# Patient Record
Sex: FEMALE | Race: WHITE | Employment: UNEMPLOYED | URBAN - METROPOLITAN AREA
[De-identification: names, ages, dates, MRNs, and addresses within clinical notes are randomized per-mention and may not be internally consistent; named-entity substitution may affect disease eponyms.]

---

## 2017-01-06 PROBLEM — R73.9 HYPERGLYCEMIA: Status: ACTIVE | Noted: 2017-01-06

## 2017-08-15 PROBLEM — M10.9 ACUTE GOUT OF KNEE: Status: ACTIVE | Noted: 2017-08-15

## 2017-09-22 ENCOUNTER — OFFICE VISIT (OUTPATIENT)
Dept: ORTHOPEDIC SURGERY | Age: 52
End: 2017-09-22
Payer: COMMERCIAL

## 2017-09-22 VITALS
HEART RATE: 97 BPM | DIASTOLIC BLOOD PRESSURE: 94 MMHG | HEIGHT: 63 IN | WEIGHT: 218 LBS | SYSTOLIC BLOOD PRESSURE: 150 MMHG | BODY MASS INDEX: 38.62 KG/M2

## 2017-09-22 DIAGNOSIS — S89.91XA RIGHT KNEE INJURY, INITIAL ENCOUNTER: Primary | ICD-10-CM

## 2017-09-22 PROCEDURE — 20610 DRAIN/INJ JOINT/BURSA W/O US: CPT | Performed by: ORTHOPAEDIC SURGERY

## 2017-09-22 PROCEDURE — 99203 OFFICE O/P NEW LOW 30 MIN: CPT | Performed by: ORTHOPAEDIC SURGERY

## 2017-09-22 RX ORDER — BETAMETHASONE SODIUM PHOSPHATE AND BETAMETHASONE ACETATE 3; 3 MG/ML; MG/ML
12 INJECTION, SUSPENSION INTRA-ARTICULAR; INTRALESIONAL; INTRAMUSCULAR; SOFT TISSUE ONCE
Status: COMPLETED | OUTPATIENT
Start: 2017-09-22 | End: 2017-09-22

## 2017-09-22 RX ORDER — BUPIVACAINE HYDROCHLORIDE 5 MG/ML
2 INJECTION, SOLUTION PERINEURAL ONCE
Status: COMPLETED | OUTPATIENT
Start: 2017-09-22 | End: 2017-09-22

## 2017-09-22 RX ORDER — LIDOCAINE HYDROCHLORIDE 10 MG/ML
2 INJECTION, SOLUTION EPIDURAL; INFILTRATION; INTRACAUDAL; PERINEURAL ONCE
Status: COMPLETED | OUTPATIENT
Start: 2017-09-22 | End: 2017-09-22

## 2017-09-22 RX ADMIN — BUPIVACAINE HYDROCHLORIDE 10 MG: 5 INJECTION, SOLUTION PERINEURAL at 11:00

## 2017-09-22 RX ADMIN — BETAMETHASONE SODIUM PHOSPHATE AND BETAMETHASONE ACETATE 12 MG: 3; 3 INJECTION, SUSPENSION INTRA-ARTICULAR; INTRALESIONAL; INTRAMUSCULAR; SOFT TISSUE at 10:59

## 2017-09-22 RX ADMIN — LIDOCAINE HYDROCHLORIDE 2 ML: 10 INJECTION, SOLUTION EPIDURAL; INFILTRATION; INTRACAUDAL; PERINEURAL at 11:00

## 2017-10-05 ENCOUNTER — TELEPHONE (OUTPATIENT)
Dept: ORTHOPEDIC SURGERY | Age: 52
End: 2017-10-05

## 2017-10-05 DIAGNOSIS — S89.91XA RIGHT KNEE INJURY, INITIAL ENCOUNTER: ICD-10-CM

## 2017-10-13 ENCOUNTER — OFFICE VISIT (OUTPATIENT)
Dept: ORTHOPEDIC SURGERY | Age: 52
End: 2017-10-13
Payer: COMMERCIAL

## 2017-10-13 VITALS — HEART RATE: 90 BPM | SYSTOLIC BLOOD PRESSURE: 118 MMHG | DIASTOLIC BLOOD PRESSURE: 69 MMHG

## 2017-10-13 DIAGNOSIS — S83.281D ACUTE LATERAL MENISCUS TEAR OF RIGHT KNEE, SUBSEQUENT ENCOUNTER: Primary | ICD-10-CM

## 2017-10-13 PROCEDURE — 99212 OFFICE O/P EST SF 10 MIN: CPT | Performed by: ORTHOPAEDIC SURGERY

## 2017-10-13 NOTE — PROGRESS NOTES
Molly Allen M.D.            [address] [facility], [address], [facility] [address].             Dept Phone: [phone]             Dept Fax:  [phone] returns today discuss results the MRI of her right knee which were performed at [facility].. The MRI was consistent with a complex tears of both the medial and lateral meniscus. She also had grade 1 strain's of the ACL PCL and LCL. No new examination was carried out today please see prior dictations    Impression  Status post above findings based on her MRI    Plan  The patient and I and her  discussed the surgical ramifications of arthroscopy. I don't think anything need to be done to her cruciates or collaterals. Has been several weeks knee should be well stabilized at this point. Specially for grade 1 injuries. She is get a have long-term problems with medial lateral meniscus tears I think she'll benefit from having arthroscopy. She does wish to proceed        Patient also had some onset of swelling to her left knee. She is on allopurinol for gout in the the but she's never had an injection to this. I told the patient that be happy to aspirate her knee at the time of her knee scope to assess for possible gout. We'll get her scheduled accordingly. Review of Systems   Constitutional: Negative. HENT: Negative. Respiratory: Negative. Cardiovascular: Negative. Musculoskeletal: Negative. Neurological: Negative.          Past Medical History:   Diagnosis Date    Cancer [facility]) 1987    cervical    Chiari I malformation ([facility] [address]     Chronic back pain     Elevated liver enzymes 09/2016    Fibromyalgia     Headache     Hepatic steatosis     Hyperthyroidism     Liver disease     Obesity     Osteoarthritis     Urinary incontinence      Past Surgical History:   Procedure Laterality Date    BRAIN SURGERY      HYSTERECTOMY      SHOULDER SURGERY Right    

## 2017-10-20 ENCOUNTER — HOSPITAL ENCOUNTER (OUTPATIENT)
Dept: PREADMISSION TESTING | Age: 52
Discharge: HOME | End: 2017-10-20
Payer: COMMERCIAL

## 2017-10-20 VITALS
BODY MASS INDEX: 38.62 KG/M2 | TEMPERATURE: 97.7 F | RESPIRATION RATE: 16 BRPM | SYSTOLIC BLOOD PRESSURE: 118 MMHG | HEART RATE: 88 BPM | OXYGEN SATURATION: 95 % | HEIGHT: 63 IN | DIASTOLIC BLOOD PRESSURE: 88 MMHG | WEIGHT: 218 LBS

## 2017-10-20 DIAGNOSIS — D68.00 VON WILLEBRAND DISEASE: ICD-10-CM

## 2017-10-20 LAB
ABSOLUTE EOS #: 0.4 K/UL (ref 0–0.4)
ABSOLUTE IMMATURE GRANULOCYTE: ABNORMAL K/UL (ref 0–0.3)
ABSOLUTE LYMPH #: 1.1 K/UL (ref 1–4.8)
ABSOLUTE MONO #: 0.5 K/UL (ref 0.1–1.3)
ANION GAP SERPL CALCULATED.3IONS-SCNC: 16 MMOL/L (ref 9–17)
BASOPHILS # BLD: 1 %
BASOPHILS ABSOLUTE: 0.1 K/UL (ref 0–0.2)
BUN BLDV-MCNC: 8 MG/DL (ref 6–20)
BUN/CREAT BLD: ABNORMAL (ref 9–20)
CALCIUM SERPL-MCNC: 9.4 MG/DL (ref 8.6–10.4)
CHLORIDE BLD-SCNC: 95 MMOL/L (ref 98–107)
CO2: 25 MMOL/L (ref 20–31)
CREAT SERPL-MCNC: 0.7 MG/DL (ref 0.5–0.9)
DIFFERENTIAL TYPE: ABNORMAL
EKG ATRIAL RATE: 89 BPM
EKG P AXIS: 49 DEGREES
EKG P-R INTERVAL: 170 MS
EKG Q-T INTERVAL: 378 MS
EKG QRS DURATION: 96 MS
EKG QTC CALCULATION (BAZETT): 459 MS
EKG R AXIS: 23 DEGREES
EKG T AXIS: 42 DEGREES
EKG VENTRICULAR RATE: 89 BPM
EOSINOPHILS RELATIVE PERCENT: 3 %
GFR AFRICAN AMERICAN: >60 ML/MIN
GFR NON-AFRICAN AMERICAN: >60 ML/MIN
GFR SERPL CREATININE-BSD FRML MDRD: ABNORMAL ML/MIN/{1.73_M2}
GFR SERPL CREATININE-BSD FRML MDRD: ABNORMAL ML/MIN/{1.73_M2}
GLUCOSE BLD-MCNC: 126 MG/DL (ref 70–99)
HCT VFR BLD CALC: 37.5 % (ref 36–46)
HEMOGLOBIN: 12.8 G/DL (ref 12–16)
IMMATURE GRANULOCYTES: ABNORMAL %
INR BLD: 1
LYMPHOCYTES # BLD: 9 %
MCH RBC QN AUTO: 30.7 PG (ref 26–34)
MCHC RBC AUTO-ENTMCNC: 34 G/DL (ref 31–37)
MCV RBC AUTO: 90.3 FL (ref 80–100)
MONOCYTES # BLD: 4 %
PARTIAL THROMBOPLASTIN TIME: 37.4 SEC (ref 23–31)
PDW BLD-RTO: 15.2 % (ref 11.5–14.9)
PLATELET # BLD: 456 K/UL (ref 150–450)
PLATELET ESTIMATE: ABNORMAL
PMV BLD AUTO: 9 FL (ref 6–12)
POTASSIUM SERPL-SCNC: 3.7 MMOL/L (ref 3.7–5.3)
PROTHROMBIN TIME: 11 SEC (ref 9.7–12)
RBC # BLD: 4.15 M/UL (ref 4–5.2)
RBC # BLD: ABNORMAL 10*6/UL
SEG NEUTROPHILS: 83 %
SEGMENTED NEUTROPHILS ABSOLUTE COUNT: 10 K/UL (ref 1.3–9.1)
SODIUM BLD-SCNC: 136 MMOL/L (ref 135–144)
WBC # BLD: 12.1 K/UL (ref 3.5–11)
WBC # BLD: ABNORMAL 10*3/UL

## 2017-10-20 PROCEDURE — 85025 COMPLETE CBC W/AUTO DIFF WBC: CPT

## 2017-10-20 PROCEDURE — 85610 PROTHROMBIN TIME: CPT

## 2017-10-20 PROCEDURE — 85730 THROMBOPLASTIN TIME PARTIAL: CPT

## 2017-10-20 PROCEDURE — 80048 BASIC METABOLIC PNL TOTAL CA: CPT

## 2017-10-20 PROCEDURE — 93005 ELECTROCARDIOGRAM TRACING: CPT

## 2017-10-20 PROCEDURE — 36415 COLL VENOUS BLD VENIPUNCTURE: CPT

## 2017-10-20 NOTE — H&P
release tablet Take 10 mg by mouth every 6 hours as needed for Pain .  levothyroxine (SYNTHROID) 75 MCG tablet TAKE 1 TABLET BY MOUTH EVERY DAY 30 tablet 11    Cholecalciferol 2000 UNITS TABS Take by mouth      Multiple Vitamins-Minerals (THERAPEUTIC MULTIVITAMIN-MINERALS) tablet Take 1 tablet by mouth daily      vitamin B-12 (CYANOCOBALAMIN) 500 MCG tablet Take 500 mcg by mouth daily      pregabalin (LYRICA) 150 MG capsule Take 1 capsule by mouth daily (Patient taking differently: Take 150 mg by mouth 2 times daily ) 60 capsule 2    Biotin 5 MG CAPS Take 1 capsule by mouth daily      valACYclovir (VALTREX) 500 MG tablet Take 500 mg by mouth daily as needed       Insulin Pen Needle (B-D ULTRAFINE III SHORT PEN) 31G X 8 MM MISC Inject 1 each into the skin daily May substitute with any brand 100 each 11     No current facility-administered medications on file prior to encounter. General health:  Fairly good. No fever or chills. Skin:  No itching, redness or rash. HEENT:  No epistaxis or sore throat. Headache, glasses, B hearing aides,               Neck:  No pain, stiffness or masses. Cardiovascular/Respiratory system:  No chest pain, palpitation or shortness of breath. Gastrointestinal tract: No abdominal pain, nausea, vomiting, diarrhea or constipation. GERD, indigestion. Genitourinary:  No burning on micturition. No hesitancy, urgency, frequency or discoloration of urine. Locomotor:  No bone or joint pains. No swelling. Neuropsychiatric:  No referable complaints. Some depression. Pseudo seizure, last couple months ago, weakness afterwards. See HPI. GENERAL PHYSICAL EXAM:     Vitals: /88   Pulse 88   Temp 97.7 °F (36.5 °C)   Resp 16   Ht 5' 3\" (1.6 m)   Wt 218 lb (98.9 kg)   SpO2 95%   BMI 38.62 kg/m²  Body mass index is 38.62 kg/m².        GENERAL APPEARANCE:   Magdalena Keith is 47 y.o.,  female, moderately obese, nourished, conscious, alert. Does not appear to be distress or pain at this time. SKIN:  Warm, dry, no cyanosis or jaundice. HEAD:  Normocephalic, atraumatic, no swelling or tenderness. EYES:  Pupils equal, reactive to light. EARS:  No discharge, no marked hearing loss. NOSE:  No rhinorrhea, epistaxis or septal deformity. THROAT:  Not congested. No ulceration bleeding or discharge. NECK:  No stiffness, trachea central.  No palpable masses or L.N.                 CHEST:  Symmetrical and equal on expansion. HEART:  RRR S1 > S2. No audible murmurs or gallops. LUNGS:  Equal on expansion, normal breath sounds. No adventitious sounds. ABDOMEN:  Obese. Soft on palpation. No localized tenderness. No guarding or rigidity. No palpable organomegaly. LYMPHATICS:  No palpable cervical lymphadenopathy. LOCOMOTOR, BACK AND SPINE:  No tenderness or deformities. EXTREMITIES:  Symmetrical, no pedal edema. Karmens sign negative. No discoloration or ulcerations. Right knee tenderness at joint line, slight effusion. Left knee tenderness. NEUROLOGIC:  The patient is conscious, alert, oriented, No apparent focal sensory or motor deficits. PROVISIONAL DIAGNOSES / SURGERY:      Right knee meniscal tear, left knee patellar femoral chondrosis  Right knee arthroscopy, left knee needle aspiration.     Patient Active Problem List    Diagnosis Date Noted    Von Willebrand disease ([facility] [address]     Acute gout of knee 08/15/2017    Hyperglycemia 01/06/2017    Gastroesophageal reflux disease without esophagitis 12/20/2016    BMI 40.0-44.9, adult ([facility] [address] 12/20/2016    Hepatic steatosis     Chiari I

## 2017-10-20 NOTE — PROGRESS NOTES
Spoke with Dr. Honey Vinson regarding pt having Von Willebrands disease. Patient relates that hematologist has sent recommendations for DDAVP pre to surgery to Dr. Thomas Caceres office. Received lab orders from Dr. Honey Vinson, no clearance requested at this time.

## 2017-11-01 ENCOUNTER — ANESTHESIA EVENT (OUTPATIENT)
Dept: OPERATING ROOM | Age: 52
End: 2017-11-01
Payer: COMMERCIAL

## 2017-11-02 ENCOUNTER — HOSPITAL ENCOUNTER (OUTPATIENT)
Age: 52
Setting detail: OUTPATIENT SURGERY
Discharge: HOME | End: 2017-11-02
Attending: ORTHOPAEDIC SURGERY | Admitting: ORTHOPAEDIC SURGERY
Payer: COMMERCIAL

## 2017-11-02 ENCOUNTER — ANESTHESIA (OUTPATIENT)
Dept: OPERATING ROOM | Age: 52
End: 2017-11-02
Payer: COMMERCIAL

## 2017-11-02 VITALS
DIASTOLIC BLOOD PRESSURE: 71 MMHG | OXYGEN SATURATION: 95 % | HEART RATE: 92 BPM | WEIGHT: 202 LBS | HEIGHT: 63 IN | RESPIRATION RATE: 16 BRPM | TEMPERATURE: 97 F | SYSTOLIC BLOOD PRESSURE: 112 MMHG | BODY MASS INDEX: 35.79 KG/M2

## 2017-11-02 VITALS — DIASTOLIC BLOOD PRESSURE: 84 MMHG | OXYGEN SATURATION: 100 % | SYSTOLIC BLOOD PRESSURE: 144 MMHG

## 2017-11-02 LAB
CRYSTALS, FLUID: NORMAL
SPECIMEN TYPE: NORMAL

## 2017-11-02 PROCEDURE — 7100000010 HC PHASE II RECOVERY - FIRST 15 MIN: Performed by: ORTHOPAEDIC SURGERY

## 2017-11-02 PROCEDURE — 89060 EXAM SYNOVIAL FLUID CRYSTALS: CPT

## 2017-11-02 PROCEDURE — 7100000031 HC ASPR PHASE II RECOVERY - ADDTL 15 MIN: Performed by: ORTHOPAEDIC SURGERY

## 2017-11-02 PROCEDURE — 3700000000 HC ANESTHESIA ATTENDED CARE: Performed by: ORTHOPAEDIC SURGERY

## 2017-11-02 PROCEDURE — 2580000003 HC RX 258: Performed by: ORTHOPAEDIC SURGERY

## 2017-11-02 PROCEDURE — 3600000013 HC SURGERY LEVEL 3 ADDTL 15MIN: Performed by: ORTHOPAEDIC SURGERY

## 2017-11-02 PROCEDURE — 7100000001 HC PACU RECOVERY - ADDTL 15 MIN: Performed by: ORTHOPAEDIC SURGERY

## 2017-11-02 PROCEDURE — 3700000001 HC ADD 15 MINUTES (ANESTHESIA): Performed by: ORTHOPAEDIC SURGERY

## 2017-11-02 PROCEDURE — 3600000003 HC SURGERY LEVEL 3 BASE: Performed by: ORTHOPAEDIC SURGERY

## 2017-11-02 PROCEDURE — 87205 SMEAR GRAM STAIN: CPT

## 2017-11-02 PROCEDURE — 7100000030 HC ASPR PHASE II RECOVERY - FIRST 15 MIN: Performed by: ORTHOPAEDIC SURGERY

## 2017-11-02 PROCEDURE — 6360000002 HC RX W HCPCS: Performed by: ORTHOPAEDIC SURGERY

## 2017-11-02 PROCEDURE — 7100000000 HC PACU RECOVERY - FIRST 15 MIN: Performed by: ORTHOPAEDIC SURGERY

## 2017-11-02 PROCEDURE — 6360000002 HC RX W HCPCS: Performed by: NURSE ANESTHETIST, CERTIFIED REGISTERED

## 2017-11-02 PROCEDURE — 6370000000 HC RX 637 (ALT 250 FOR IP): Performed by: ANESTHESIOLOGY

## 2017-11-02 PROCEDURE — 87070 CULTURE OTHR SPECIMN AEROBIC: CPT

## 2017-11-02 PROCEDURE — 87075 CULTR BACTERIA EXCEPT BLOOD: CPT

## 2017-11-02 PROCEDURE — 7100000011 HC PHASE II RECOVERY - ADDTL 15 MIN: Performed by: ORTHOPAEDIC SURGERY

## 2017-11-02 PROCEDURE — 2720000010 HC SURG SUPPLY STERILE: Performed by: ORTHOPAEDIC SURGERY

## 2017-11-02 PROCEDURE — 2500000003 HC RX 250 WO HCPCS: Performed by: NURSE ANESTHETIST, CERTIFIED REGISTERED

## 2017-11-02 RX ORDER — ONDANSETRON 2 MG/ML
INJECTION INTRAMUSCULAR; INTRAVENOUS PRN
Status: DISCONTINUED | OUTPATIENT
Start: 2017-11-02 | End: 2017-11-02 | Stop reason: SDUPTHER

## 2017-11-02 RX ORDER — LIDOCAINE HYDROCHLORIDE 10 MG/ML
INJECTION, SOLUTION EPIDURAL; INFILTRATION; INTRACAUDAL; PERINEURAL PRN
Status: DISCONTINUED | OUTPATIENT
Start: 2017-11-02 | End: 2017-11-02 | Stop reason: SDUPTHER

## 2017-11-02 RX ORDER — MIDAZOLAM HYDROCHLORIDE 1 MG/ML
INJECTION INTRAMUSCULAR; INTRAVENOUS PRN
Status: DISCONTINUED | OUTPATIENT
Start: 2017-11-02 | End: 2017-11-02 | Stop reason: SDUPTHER

## 2017-11-02 RX ORDER — FENTANYL CITRATE 50 UG/ML
25 INJECTION, SOLUTION INTRAMUSCULAR; INTRAVENOUS EVERY 5 MIN PRN
Status: DISCONTINUED | OUTPATIENT
Start: 2017-11-02 | End: 2017-11-02 | Stop reason: HOSPADM

## 2017-11-02 RX ORDER — KETOROLAC TROMETHAMINE 30 MG/ML
INJECTION, SOLUTION INTRAMUSCULAR; INTRAVENOUS PRN
Status: DISCONTINUED | OUTPATIENT
Start: 2017-11-02 | End: 2017-11-02 | Stop reason: SDUPTHER

## 2017-11-02 RX ORDER — PROPOFOL 10 MG/ML
INJECTION, EMULSION INTRAVENOUS PRN
Status: DISCONTINUED | OUTPATIENT
Start: 2017-11-02 | End: 2017-11-02 | Stop reason: SDUPTHER

## 2017-11-02 RX ORDER — SODIUM CHLORIDE, SODIUM LACTATE, POTASSIUM CHLORIDE, CALCIUM CHLORIDE 600; 310; 30; 20 MG/100ML; MG/100ML; MG/100ML; MG/100ML
INJECTION, SOLUTION INTRAVENOUS CONTINUOUS
Status: DISCONTINUED | OUTPATIENT
Start: 2017-11-02 | End: 2017-11-02 | Stop reason: HOSPADM

## 2017-11-02 RX ORDER — DIPHENHYDRAMINE HYDROCHLORIDE 50 MG/ML
12.5 INJECTION INTRAMUSCULAR; INTRAVENOUS
Status: DISCONTINUED | OUTPATIENT
Start: 2017-11-02 | End: 2017-11-02 | Stop reason: HOSPADM

## 2017-11-02 RX ORDER — SCOLOPAMINE TRANSDERMAL SYSTEM 1 MG/1
1 PATCH, EXTENDED RELEASE TRANSDERMAL
Status: DISCONTINUED | OUTPATIENT
Start: 2017-11-02 | End: 2017-11-02 | Stop reason: HOSPADM

## 2017-11-02 RX ORDER — PROMETHAZINE HYDROCHLORIDE 25 MG/ML
6.25 INJECTION, SOLUTION INTRAMUSCULAR; INTRAVENOUS
Status: DISCONTINUED | OUTPATIENT
Start: 2017-11-02 | End: 2017-11-02 | Stop reason: HOSPADM

## 2017-11-02 RX ORDER — ROPIVACAINE HYDROCHLORIDE 5 MG/ML
INJECTION, SOLUTION EPIDURAL; INFILTRATION; PERINEURAL PRN
Status: DISCONTINUED | OUTPATIENT
Start: 2017-11-02 | End: 2017-11-02 | Stop reason: HOSPADM

## 2017-11-02 RX ORDER — FENTANYL CITRATE 50 UG/ML
INJECTION, SOLUTION INTRAMUSCULAR; INTRAVENOUS PRN
Status: DISCONTINUED | OUTPATIENT
Start: 2017-11-02 | End: 2017-11-02 | Stop reason: SDUPTHER

## 2017-11-02 RX ORDER — DEXAMETHASONE SODIUM PHOSPHATE 4 MG/ML
INJECTION, SOLUTION INTRA-ARTICULAR; INTRALESIONAL; INTRAMUSCULAR; INTRAVENOUS; SOFT TISSUE PRN
Status: DISCONTINUED | OUTPATIENT
Start: 2017-11-02 | End: 2017-11-02 | Stop reason: SDUPTHER

## 2017-11-02 RX ADMIN — SODIUM CHLORIDE, POTASSIUM CHLORIDE, SODIUM LACTATE AND CALCIUM CHLORIDE: 600; 310; 30; 20 INJECTION, SOLUTION INTRAVENOUS at 07:09

## 2017-11-02 RX ADMIN — ONDANSETRON 4 MG: 2 INJECTION INTRAMUSCULAR; INTRAVENOUS at 08:02

## 2017-11-02 RX ADMIN — PROPOFOL 200 MG: 10 INJECTION, EMULSION INTRAVENOUS at 07:52

## 2017-11-02 RX ADMIN — DEXAMETHASONE SODIUM PHOSPHATE 8 MG: 4 INJECTION, SOLUTION INTRAMUSCULAR; INTRAVENOUS at 08:02

## 2017-11-02 RX ADMIN — FENTANYL CITRATE 50 MCG: 50 INJECTION, SOLUTION INTRAMUSCULAR; INTRAVENOUS at 08:05

## 2017-11-02 RX ADMIN — MIDAZOLAM 2 MG: 1 INJECTION INTRAMUSCULAR; INTRAVENOUS at 07:50

## 2017-11-02 RX ADMIN — LIDOCAINE HYDROCHLORIDE 50 MG: 10 INJECTION, SOLUTION EPIDURAL; INFILTRATION; INTRACAUDAL; PERINEURAL at 07:52

## 2017-11-02 RX ADMIN — FENTANYL CITRATE 100 MCG: 50 INJECTION, SOLUTION INTRAMUSCULAR; INTRAVENOUS at 07:52

## 2017-11-02 RX ADMIN — KETOROLAC TROMETHAMINE 30 MG: 30 INJECTION, SOLUTION INTRAMUSCULAR; INTRAVENOUS at 08:15

## 2017-11-02 RX ADMIN — SODIUM CHLORIDE, POTASSIUM CHLORIDE, SODIUM LACTATE AND CALCIUM CHLORIDE: 600; 310; 30; 20 INJECTION, SOLUTION INTRAVENOUS at 07:50

## 2017-11-02 ASSESSMENT — PAIN SCALES - GENERAL
PAINLEVEL_OUTOF10: 0

## 2017-11-02 ASSESSMENT — PAIN DESCRIPTION - DESCRIPTORS: DESCRIPTORS: THROBBING;SHARP

## 2017-11-02 ASSESSMENT — PAIN - FUNCTIONAL ASSESSMENT: PAIN_FUNCTIONAL_ASSESSMENT: 0-10

## 2017-11-02 NOTE — H&P (VIEW-ONLY)
HISTORY and [facility] [address]       NAME:  Chalino Nichols  MRN: [id number]   YOB: 1965   Date: 10/20/2017   Age: 47 y.o. Gender: female       COMPLAINT AND PRESENT HISTORY:   47 y o female with right knee pain and torn meniscus. Patient fell on 8/31/17 and a few days later lost her balance going down steps. Patient has various motor difficulties R/T Chiari I malformation. States she has had 3 brain surgeries, also tends to experience headaches, taking oxycodone and ibuprofen. Some use of ice with some relief. Also, notes some weakness and pain when she 1st gets up to 10 on scale. Patient had bilateral arthroscopy at age 14 for apparent torn ligaments. She has received a steroid injection in the right knee. PAST MEDICAL HISTORY     Past Medical History:   Diagnosis Date    Cancer [facility]) 1987    cervical    Chiari I malformation ([facility] [address]     Chronic back pain     Elevated liver enzymes 09/2016    Fibromyalgia     GERD (gastroesophageal reflux disease)     Headache     Hepatic steatosis     [place] (hard of hearing)     Kidney stones     Liver disease     Obesity     Osteoarthritis     PONV (postoperative nausea and vomiting)     Seizures (HCC)     non epileptic    Thyroid disease     hypothyroid    Urinary incontinence     UTI (urinary tract infection)     Von Willebrand disease ([facility] [address]     Wears glasses    von Willebrand disease, seizures, GERD,     Pt denies any history of Diabetes mellitus type 2, hypertension, stroke, heart disease, COPD, Asthma, HLD, Seizures, Kidney Disease, Hepatitis, TB, Psychiatric Disorders or Substance abuse.     SURGICAL HISTORY       Past Surgical History:   Procedure Laterality Date    APPENDECTOMY      BACK SURGERY      lumbar surgery    BRAIN SURGERY      CARPAL TUNNEL RELEASE Right     CHOLECYSTECTOMY      COLONOSCOPY      DILATION AND CURETTAGE OF UTERUS      few times    HYSTERECTOMY      KNEE ARTHROSCOPY Bilateral  SHOULDER SURGERY Right        FAMILY HISTORY       Family History   Problem Relation Age of Onset    Kidney Disease Mother     Breast Cancer Mother      breast    Kidney Disease Sister     Breast Cancer Sister      breast    Kidney Disease Brother     Breast Cancer Maternal Grandmother      breast    Breast Cancer Paternal Grandmother      breast       SOCIAL HISTORY       Social History     Social History    Marital status:      Spouse name: N/A    Number of children: N/A    Years of education: N/A     Social History Main Topics    Smoking status: Current Every Day Smoker     Packs/day: 0.25     Years: 20.00    Smokeless tobacco: Never Used    Alcohol use No    Drug use: No    Sexual activity: Not Asked     Other Topics Concern    None     Social History Narrative    None           REVIEW OF SYSTEMS      Allergies   Allergen Reactions    Adhesive Tape     Meperidine Itching    Morphine Other (See Comments)    Nubain  [Nalbuphine Hcl] Other (See Comments)     Nubain       Current Outpatient Prescriptions on File Prior to Encounter   Medication Sig Dispense Refill    diazepam (VALIUM) 5 MG tablet Take 1 tablet by mouth every 8 hours as needed for Anxiety 90 tablet 0    allopurinol (ZYLOPRIM) 300 MG tablet Take 1 tablet by mouth daily 30 tablet 11    potassium chloride (KLOR-CON M) 20 MEQ extended release tablet TAKE 1 TABLET BY MOUTH DAILY 90 tablet 3    nortriptyline (PAMELOR) 50 MG capsule TAKE 3 CAPSULES BY MOUTH AT BEDTIME 270 capsule 3    spironolactone (ALDACTONE) 50 MG tablet TAKE 1 TABLET BY MOUTH EVERY DAY 90 tablet 3    hydrochlorothiazide (HYDRODIURIL) 25 MG tablet TAKE 1 TABLET BY MOUTH EVERY DAY 30 tablet 11    ibuprofen (ADVIL;MOTRIN) 800 MG tablet Take 1 tablet by mouth every 8 hours as needed for Pain 90 tablet 11    omeprazole (PRILOSEC) 20 MG delayed release capsule TAKE 1 CAPSULE BY MOUTH TWICE DAILY 60 capsule 11    oxyCODONE (OXYCONTIN) 10 MG extended

## 2017-11-02 NOTE — OP NOTE
Operative dictation    . Diagnosis: Medial and lateral meniscus tears right knee. PCL and ACL injuries  Postop diagnosis: Medial and lateral meniscus tears complete ACL tear right knee. Effusion left knee possible acute gouty attack    Procedure: Arthroscopic examination right knee with a partial medial meniscectomy, subtotal lateral meniscectomy, and ACL debridement  Aspiration left knee    Sanjay Peterson is a 71-year-old patient who sustained a fall and injured her right knee. She had a MRI of this knee which revealed fairly significant injuries with medial and lateral meniscus tears and what was described on the MRI as grade 1 PCL ACL and medial collateral ligament injuries. Patient was very asymptomatic and after all had healed as it was felt that she benefit from arthroscopy. Consent was obtained good comprehension hours and benefits    Patient was taken operating room had successful induction of general anesthesia. Under sterile conditions the left knee was aspirated of synovial fluid sent off for appropriate studies including crystals the right leg was placed in a leg [place] after tourniquet was applied leg was exsanguinated turning inflated to 300 mils right greater than prep and drape in usual fashion medial lateral portals are established. It was noted the patient a very positive Lockman's articular surface of the patellofemoral joint was unremarkable and the past scope in the medial gutter medial compartment where complex tear of the medial meniscus identified a probe partial medial facet was carried out with straight as well as up-biting basket forceps down to stable rim that smooth out the 4.0 tomcat the anterior horn had some tearing as well which was debrided. Patient's ACL was very high-grade if not complete tear with unstable portions which were debrided with the shaver. The patient also had upon edges lateral compartment very complex tear of posterior and anterior horns up.   This was treated with basket forceps and shaver. Patient had just a thin rim remaining without highly visible popliteus. There was no obvious articular damage visible. The scope and shaver the past back in the suprapatellar and the shaver used to remove any further soft tissue debridement. The arthroscopic equipment was removed and evacuated fluid still 20 mL 0.2% ropivacaine and the portals closed with 4-0 nylon suture. Sterile dressings were applied wrap and Ace bandage toes midthigh. The tourniquet was deflated tourniquet time less than 30 minutes.   Patient was awakened and taken to postanesthesia care unit in good condition

## 2017-11-02 NOTE — ANESTHESIA PRE PROCEDURE
Department of Anesthesiology  Preprocedure Note       Name:  Cristino Lopes   Age:  47 y.o.  :  1965                                          MRN:  [id number]         Date:  2017      Surgeon: Shane Cook):  Leveda Phalen, MD    Procedure: Procedure(s):  KNEE ARTHROSCOPY RIGHT WITH NEEDLE ASPIRATION LEFT KNEE    Medications prior to admission:   Prior to Admission medications    Medication Sig Start Date End Date Taking? Authorizing Provider   diphenoxylate-atropine (DIPHENATOL) 2.5-0.025 MG per tablet Take 1 tablet by mouth 4 times daily as needed for Diarrhea 10/25/17 11/4/17 Yes Zulema Lockett CNP   diazepam (VALIUM) 5 MG tablet Take 1 tablet by mouth every 8 hours as needed for Anxiety 10/16/17  Yes Jovita Nicolas MD   allopurinol (ZYLOPRIM) 300 MG tablet Take 1 tablet by mouth daily 8/15/17  Yes Jovita Nicolas MD   potassium chloride (KLOR-CON M) 20 MEQ extended release tablet TAKE 1 TABLET BY MOUTH DAILY 17  Yes Jovita Nicolas MD   nortriptyline (PAMELOR) 50 MG capsule TAKE 3 CAPSULES BY MOUTH AT BEDTIME 17  Yes Zulema Lockett CNP   spironolactone (ALDACTONE) 50 MG tablet TAKE 1 TABLET BY MOUTH EVERY DAY 17  Yes Jovita Nicolas MD   hydrochlorothiazide (HYDRODIURIL) 25 MG tablet TAKE 1 TABLET BY MOUTH EVERY DAY 17  Yes Jovita Nicolas MD   omeprazole (PRILOSEC) 20 MG delayed release capsule TAKE 1 CAPSULE BY MOUTH TWICE DAILY 17  Yes Zulema Lockett CNP   oxyCODONE (OXYCONTIN) 10 MG extended release tablet Take 10 mg by mouth every 6 hours as needed for Pain .    Yes Historical Provider, MD   levothyroxine (SYNTHROID) 75 MCG tablet TAKE 1 TABLET BY MOUTH EVERY DAY 16  Yes Jovita Nicolas MD   Cholecalciferol 2000 UNITS TABS Take by mouth   Yes Historical Provider, MD   Multiple Vitamins-Minerals (THERAPEUTIC MULTIVITAMIN-MINERALS) tablet Take 1 tablet by mouth daily   Yes Historical Provider, MD   vitamin B-12 Date of last solid food consumption: 11/01/17    BMI:   Wt Readings from Last 3 Encounters:   11/02/17 202 lb (91.6 kg)   10/31/17 202 lb (91.6 kg)   10/20/17 218 lb (98.9 kg)     Body mass index is 35.78 kg/m². CBC:   Lab Results   Component Value Date    WBC 12.1 10/20/2017    RBC 4.15 10/20/2017    HGB 12.8 10/20/2017    HCT 37.5 10/20/2017    MCV 90.3 10/20/2017    RDW 15.2 10/20/2017     10/20/2017       CMP:   Lab Results   Component Value Date     10/20/2017    K 3.7 10/20/2017    CL 95 10/20/2017    CO2 25 10/20/2017    BUN 8 10/20/2017    CREATININE 0.70 10/20/2017    GFRAA >60 10/20/2017    LABGLOM >60 10/20/2017    GLUCOSE 126 10/20/2017    CALCIUM 9.4 10/20/2017       POC Tests: No results for input(s): POCGLU, POCNA, POCK, POCCL, POCBUN, POCHEMO, POCHCT in the last 72 hours. Coags:   Lab Results   Component Value Date    PROTIME 11.0 10/20/2017    INR 1.0 10/20/2017    APTT 37.4 10/20/2017       HCG (If Applicable): No results found for: PREGTESTUR, PREGSERUM, HCG, HCGQUANT     ABGs: No results found for: PHART, PO2ART, BKM0ZWX, YDW9BDZ, BEART, A5WJOLWB     Type & Screen (If Applicable):  No results found for: LABABO, [address]    Anesthesia Evaluation  Patient summary reviewed and Nursing notes reviewed   history of anesthetic complications: PONV.   Airway: Mallampati: II  TM distance: >3 FB   Neck ROM: full  Mouth opening: > = 3 FB Dental: normal exam         Pulmonary:normal exam  breath sounds clear to auscultation                             Cardiovascular:          ECG reviewed  Rhythm: regular  Rate: normal                    Neuro/Psych:   (+) seizures:, headaches:, psychiatric history:            GI/Hepatic/Renal:   (+) GERD:, liver disease:,           Endo/Other:    (+) hypothyroidism::.                    Comments: Von Willebrand disease Abdominal:           Vascular:                                      Anesthesia Plan      general     ASA 3       Induction: intravenous. MIPS: Postoperative opioids intended and Prophylactic antiemetics administered. Anesthetic plan and risks discussed with patient. Plan discussed with CRNA.                   Reshma Rogers MD   11/2/2017

## 2017-11-02 NOTE — INTERVAL H&P NOTE
HISTORY and Treinta ORTIZ [facility] [address]       NAME:  Amy Lauren  MRN: [id number]   YOB: 1965   Date: 11/2/2017   Age: 47 y.o. Gender: female     H&P Update Note    H&P from 10/20/2017  reviewed and updated, Patient examined. Vitals: /74   Pulse 98   Temp 97.5 °F (36.4 °C) (Oral)   Resp 18   Ht 5' 3\" (1.6 m)   Wt 202 lb (91.6 kg)   SpO2 95%   BMI 35.78 kg/m²  Body mass index is 35.78 kg/m². No interval changes. I concur with the findings.        ESAU MARQUEZ PA-C on 11/2/2017 at 7:08 AM

## 2017-11-07 LAB
CULTURE: ABNORMAL
CULTURE: ABNORMAL
DIRECT EXAM: ABNORMAL
Lab: ABNORMAL
SPECIMEN DESCRIPTION: ABNORMAL
STATUS: ABNORMAL

## 2017-11-10 ENCOUNTER — OFFICE VISIT (OUTPATIENT)
Dept: ORTHOPEDIC SURGERY | Age: 52
End: 2017-11-10

## 2017-11-10 DIAGNOSIS — S83.281D ACUTE LATERAL MENISCUS TEAR OF RIGHT KNEE, SUBSEQUENT ENCOUNTER: Primary | ICD-10-CM

## 2017-11-10 PROCEDURE — 99024 POSTOP FOLLOW-UP VISIT: CPT | Performed by: ORTHOPAEDIC SURGERY

## 2018-01-16 PROBLEM — M35.3 POLYMYALGIA RHEUMATICA (HCC): Status: ACTIVE | Noted: 2018-01-16

## 2018-08-31 PROBLEM — E55.9 VITAMIN D DEFICIENCY: Status: ACTIVE | Noted: 2018-08-31

## 2019-06-19 ENCOUNTER — OFFICE VISIT (OUTPATIENT)
Dept: ORTHOPEDIC SURGERY | Age: 54
End: 2019-06-19
Payer: MEDICARE

## 2019-06-19 VITALS — BODY MASS INDEX: 38.62 KG/M2 | WEIGHT: 218 LBS | HEIGHT: 63 IN

## 2019-06-19 DIAGNOSIS — M25.561 CHRONIC PAIN OF RIGHT KNEE: Primary | ICD-10-CM

## 2019-06-19 DIAGNOSIS — G89.29 CHRONIC PAIN OF RIGHT KNEE: Primary | ICD-10-CM

## 2019-06-19 PROCEDURE — 20610 DRAIN/INJ JOINT/BURSA W/O US: CPT | Performed by: ORTHOPAEDIC SURGERY

## 2019-06-19 PROCEDURE — 99213 OFFICE O/P EST LOW 20 MIN: CPT | Performed by: ORTHOPAEDIC SURGERY

## 2019-06-19 RX ORDER — OXYCODONE HYDROCHLORIDE 30 MG/1
1 TABLET, FILM COATED, EXTENDED RELEASE ORAL EVERY 12 HOURS
COMMUNITY

## 2019-06-20 RX ORDER — LIDOCAINE HYDROCHLORIDE 10 MG/ML
5 INJECTION, SOLUTION INFILTRATION; PERINEURAL ONCE
Status: COMPLETED | OUTPATIENT
Start: 2019-06-20 | End: 2019-06-20

## 2019-06-20 RX ORDER — BUPIVACAINE HYDROCHLORIDE 5 MG/ML
2 INJECTION, SOLUTION PERINEURAL ONCE
Status: COMPLETED | OUTPATIENT
Start: 2019-06-20 | End: 2019-06-20

## 2019-06-20 RX ORDER — BETAMETHASONE SODIUM PHOSPHATE AND BETAMETHASONE ACETATE 3; 3 MG/ML; MG/ML
12 INJECTION, SUSPENSION INTRA-ARTICULAR; INTRALESIONAL; INTRAMUSCULAR; SOFT TISSUE ONCE
Status: COMPLETED | OUTPATIENT
Start: 2019-06-20 | End: 2019-06-20

## 2019-06-20 RX ADMIN — BETAMETHASONE SODIUM PHOSPHATE AND BETAMETHASONE ACETATE 12 MG: 3; 3 INJECTION, SUSPENSION INTRA-ARTICULAR; INTRALESIONAL; INTRAMUSCULAR; SOFT TISSUE at 15:26

## 2019-06-20 RX ADMIN — BUPIVACAINE HYDROCHLORIDE 10 MG: 5 INJECTION, SOLUTION PERINEURAL at 15:26

## 2019-06-20 RX ADMIN — LIDOCAINE HYDROCHLORIDE 5 ML: 10 INJECTION, SOLUTION INFILTRATION; PERINEURAL at 15:27

## 2019-06-20 NOTE — PROGRESS NOTES
Vera Schuster M.D.            [address], [address] [facility]             Dept Phone: [phone]             Dept Fax:  [phone] returns today. She has a history of previous arthroscopy to her right knee. She initially did well with this but during the course last few months is having increasing pain. She denies any injury or trauma. She is most of her symptoms are arthritic in nature. No sharp stabbing symptoms. She has tried over-the-counter medications to no avail    Examination of her right knee notes a moderate effusion. Her motion is 3-120 degrees. Ramon's is negative collaterals are good cruciates are good some crepitus medially. No calf tenderness negative Homans mild patellofemoral pain no hip rotational pain no IT band findings no trochanteric findings. XR taken today:  Xr Knee Right (1-2 Views)    Result Date: 6/20/2019  X-rays taken today reviewed and reviewed by me shows standing AP lateral views of the right knee. Patient does have some mild early moderate medial joint space narrowing. No acute fracture or dislocation noted. She does have effusion present. Impression  Status post previous arthroscopic arthroscopic partial medial meniscectomy right knee  Early to moderate DJD medial compartment right knee    Under sterile conditions the patient was aspirated approximately 12 cc of synovial fluid and injected with Celestone. This did improve her symptoms. Plan  Patient to continue with over-the-counter medications. If her symptoms persist may consider starting on a daily arthritic medication regimen. Back her per her request    Chief Complaint   Patient presents with    Knee Pain     Right         Review of Systems   Constitutional: Negative. HENT: Negative. Respiratory: Negative. Cardiovascular: Negative. Neurological: Negative.     Musculoskeletal:   Knee Pain (Right )          Current Outpatient Medications:     oxyCODONE (OXYCONTIN) 30 MG T12A extended release tablet, Take 1 tablet by mouth every 12 hours. , Disp: , Rfl:     diphenoxylate-atropine (LOMOTIL) 2.5-0.025 MG per tablet, Take 1 tablet by mouth 4 times daily as needed for Diarrhea for up to 10 days. , Disp: 40 tablet, Rfl: 0    diazepam (VALIUM) 5 MG tablet, Take 1 tablet by mouth every 8 hours as needed for Anxiety for up to 30 days. , Disp: 90 tablet, Rfl: 0    valACYclovir (VALTREX) 500 MG tablet, Take 1 tablet by mouth daily as needed (lesions), Disp: 30 tablet, Rfl: 11    omeprazole (PRILOSEC) 40 MG delayed release capsule, Take 1 capsule by mouth daily, Disp: 90 capsule, Rfl: 3    potassium chloride (KLOR-CON M) 20 MEQ extended release tablet, Take 2 tablets by mouth daily, Disp: 180 tablet, Rfl: 3    hydrochlorothiazide (HYDRODIURIL) 25 MG tablet, TAKE 1 TABLET BY MOUTH EVERY DAY, Disp: 30 tablet, Rfl: 11    predniSONE (DELTASONE) 5 MG tablet, 7,5 mg daily, Disp: , Rfl:     ibuprofen (ADVIL;MOTRIN) 800 MG tablet, Take 1 tablet by mouth every 8 hours as needed for Pain, Disp: 90 tablet, Rfl: 11    spironolactone (ALDACTONE) 50 MG tablet, TAKE 1 TABLET BY MOUTH EVERY DAY, Disp: 90 tablet, Rfl: 3    triamcinolone (KENALOG) 0.025 % cream, Apply topically 2 times daily. , Disp: 80 g, Rfl: 0    nortriptyline (PAMELOR) 50 MG capsule, TAKE 3 CAPSULES BY MOUTH AT BEDTIME, Disp: 270 capsule, Rfl: 3    levothyroxine (SYNTHROID) 75 MCG tablet, TAKE 1 TABLET BY MOUTH EVERY DAY, Disp: 90 tablet, Rfl: 3    oxyCODONE (OXYCONTIN) 10 MG extended release tablet, Take 10 mg by mouth as needed for Pain. , Disp: , Rfl:     Cholecalciferol 2000 UNITS TABS, Take by mouth, Disp: , Rfl:     Multiple Vitamins-Minerals (THERAPEUTIC MULTIVITAMIN-MINERALS) tablet, Take 1 tablet by mouth daily, Disp: , Rfl:     vitamin B-12 (CYANOCOBALAMIN) 500 MCG tablet, Take 500 mcg by mouth daily, Disp: , Rfl:     pregabalin (LYRICA) 150 MG capsule, Take 1 capsule by mouth daily (Patient taking differently: Take 150 mg by mouth 2 times daily ), Disp: 60 capsule, Rfl: 2    Biotin 5 MG CAPS, Take 1 capsule by mouth daily, Disp: , Rfl:     Allergies   Allergen Reactions    Adhesive Tape     Meperidine Itching    Morphine Other (See Comments)    Nubain  [Nalbuphine Hcl] Other (See Comments)     Nubain       Social History     Socioeconomic History    Marital status:      Spouse name: Not on file    Number of children: Not on file    Years of education: Not on file    Highest education level: Not on file   Occupational History    Not on file   Social Needs    Financial resource strain: Not on file    Food insecurity:     Worry: Not on file     Inability: Not on file    Transportation needs:     Medical: Not on file     Non-medical: Not on file   Tobacco Use    Smoking status: Current Every Day Smoker     Packs/day: 0.25     Years: 20.00     Pack years: 5.00    Smokeless tobacco: Never Used   Substance and Sexual Activity    Alcohol use: No     Alcohol/week: 0.0 oz    Drug use: No    Sexual activity: Not on file   Lifestyle    Physical activity:     Days per week: Not on file     Minutes per session: Not on file    Stress: Not on file   Relationships    Social connections:     Talks on phone: Not on file     Gets together: Not on file     Attends Church service: Not on file     Active member of club or organization: Not on file     Attends meetings of clubs or organizations: Not on file     Relationship status: Not on file    Intimate partner violence:     Fear of current or ex partner: Not on file     Emotionally abused: Not on file     Physically abused: Not on file     Forced sexual activity: Not on file   Other Topics Concern    Not on file   Social History Narrative    Not on file       Past Medical History:   Diagnosis Date    Cancer [facility]) 1987    cervical    Chiari I malformation ([facility] [address]     Chronic back pain     Elevated liver enzymes 09/2016    Fibromyalgia     GERD (gastroesophageal reflux disease)     Headache     Hepatic steatosis     [place] (hard of hearing)     Kidney stones     Liver disease     Obesity     Osteoarthritis     PONV (postoperative nausea and vomiting)     Seizures (HCC)     non epileptic    Thyroid disease     hypothyroid    Urinary incontinence     UTI (urinary tract infection)     Von Willebrand disease ([address]     Wears glasses      Past Surgical History:   Procedure Laterality Date    APPENDECTOMY      BACK SURGERY      lumbar surgery    BRAIN SURGERY      CARPAL TUNNEL RELEASE Right     CHOLECYSTECTOMY      COLONOSCOPY      DILATION AND CURETTAGE OF UTERUS      few times    HYSTERECTOMY      KNEE ARTHROSCOPY Bilateral     NV KNEE SCOPE,DIAGNOSTIC Bilateral 11/2/2017    KNEE ARTHROSCOPY RIGHT WITH PARTIAL MEDIAL MENISECTOMY, SUBTOTAL LATERAL MENISECTOMY, AND DEBRIDEMENT OF ACL /  NEEDLE ASPIRATION LEFT KNEE performed by Александр Zaragoza MD at [address] Right      Family History   Problem Relation Age of Onset    Kidney Disease Mother     Breast Cancer Mother         breast    Kidney Disease Sister     Breast Cancer Sister         breast    Kidney Disease Brother     Breast Cancer Maternal Grandmother         breast    Breast Cancer Paternal Grandmother         breast           Orders Placed This Encounter   Procedures    XR KNEE RIGHT (1-2 VIEWS)     Standing Status:   Future     Number of Occurrences:   1     Standing Expiration Date:   6/19/2020  20610 - DRAIN/INJECT LARGE JOINT BURSA       1. Chronic pain of right knee            Александр Zaragoza MD    Please note that this chart was generated using voice recognition Dragon dictation software. Although every effort was made to ensure the accuracy of this automated transcription, some errors in transcription may have occurred.

## 2019-06-22 DIAGNOSIS — E11.9 CONTROLLED TYPE 2 DIABETES MELLITUS WITHOUT COMPLICATION, WITHOUT LONG-TERM CURRENT USE OF INSULIN (HCC): Primary | ICD-10-CM

## 2019-08-07 ENCOUNTER — TELEPHONE (OUTPATIENT)
Dept: ORTHOPEDIC SURGERY | Age: 54
End: 2019-08-07

## 2019-08-15 ENCOUNTER — OFFICE VISIT (OUTPATIENT)
Dept: ORTHOPEDIC SURGERY | Age: 54
End: 2019-08-15
Payer: MEDICARE

## 2019-08-15 VITALS — BODY MASS INDEX: 36.86 KG/M2 | HEIGHT: 63 IN | WEIGHT: 208 LBS

## 2019-08-15 DIAGNOSIS — M25.532 LEFT WRIST PAIN: Primary | ICD-10-CM

## 2019-08-15 DIAGNOSIS — M77.8 TENDINITIS OF LEFT WRIST: ICD-10-CM

## 2019-08-15 PROCEDURE — 99203 OFFICE O/P NEW LOW 30 MIN: CPT | Performed by: PHYSICIAN ASSISTANT

## 2019-08-15 RX ORDER — DICLOFENAC SODIUM 75 MG/1
75 TABLET, DELAYED RELEASE ORAL 2 TIMES DAILY WITH MEALS
Qty: 28 TABLET | Refills: 0 | Status: SHIPPED | OUTPATIENT
Start: 2019-08-15 | End: 2019-11-26

## 2019-08-15 ASSESSMENT — ENCOUNTER SYMPTOMS
COUGH: 0
RHINORRHEA: 0
VOMITING: 0
COLOR CHANGE: 0
EYES NEGATIVE: 1
NAUSEA: 0

## 2019-08-15 NOTE — PROGRESS NOTES
taken in clinic today and reviewed by me. AP lateral left wrist reveals no evidence of obvious fracture or dislocation. With some degenerative changes to the first [facility] of the thumb. Plan:     Patient is given cock-up wrist splint today. She is instructed to wear at all times over the next 3 weeks. Follow up 3 weeks for reevaluation    Orders Placed This Encounter   Procedures    XR WRIST LEFT (MIN 3 VIEWS)     Standing Status:   Future     Number of Occurrences:   1     Standing Expiration Date:   8/14/2020        CANDACE Anderson      Please note that this chart was generated using voicerecognition Dragon dictation software. Although every effort was made to ensurethe accuracy of this automated transcription, some errors in transcription may haveoccurred.

## 2019-08-21 ENCOUNTER — TELEPHONE (OUTPATIENT)
Dept: ORTHOPEDIC SURGERY | Age: 54
End: 2019-08-21

## 2019-08-21 NOTE — TELEPHONE ENCOUNTER
Spoke with patient to let her know that Supartz injection was denied by her insurance. Will attempt authorization for Monovisc as patient is requesting she still gets a gel injection.

## 2019-08-29 ENCOUNTER — TELEPHONE (OUTPATIENT)
Dept: ORTHOPEDIC SURGERY | Age: 54
End: 2019-08-29

## 2019-09-13 ENCOUNTER — OFFICE VISIT (OUTPATIENT)
Dept: ORTHOPEDIC SURGERY | Age: 54
End: 2019-09-13
Payer: MEDICARE

## 2019-09-13 VITALS — HEIGHT: 63 IN | BODY MASS INDEX: 36.86 KG/M2 | WEIGHT: 208 LBS

## 2019-09-13 DIAGNOSIS — M77.8 TENDINITIS OF LEFT WRIST: Primary | ICD-10-CM

## 2019-09-13 DIAGNOSIS — M17.11 PRIMARY OSTEOARTHRITIS OF RIGHT KNEE: ICD-10-CM

## 2019-09-13 PROCEDURE — 99213 OFFICE O/P EST LOW 20 MIN: CPT | Performed by: PHYSICIAN ASSISTANT

## 2019-09-13 ASSESSMENT — ENCOUNTER SYMPTOMS
RHINORRHEA: 0
EYES NEGATIVE: 1
COLOR CHANGE: 0
NAUSEA: 0
VOMITING: 0
COUGH: 0

## 2019-09-13 NOTE — PROGRESS NOTES
NEEDLE ASPIRATION LEFT KNEE performed by Anup Mckeon MD at [address] Right      Family History   Problem Relation Age of Onset    Kidney Disease Mother    [facility] Breast Cancer Mother         breast    Kidney Disease Sister     Breast Cancer Sister         breast    Kidney Disease Brother     Breast Cancer Maternal Grandmother         breast    Breast Cancer Paternal Grandmother         breast     Social History     Occupational History    Not on file   Tobacco Use    Smoking status: Current Every Day Smoker     Packs/day: 0.25     Years: 20.00     Pack years: 5.00    Smokeless tobacco: Never Used   Substance and Sexual Activity    Alcohol use: No     Alcohol/week: 0.0 standard drinks    Drug use: No    Sexual activity: Not on file        Review of Systems   Constitutional: Negative for chills and fever. HENT: Negative for congestion and rhinorrhea. Eyes: Negative. Respiratory: Negative for cough. Cardiovascular: Negative for chest pain and leg swelling. Gastrointestinal: Negative for nausea and vomiting. Musculoskeletal: Positive for arthralgias (Right knee, left wrist) and joint swelling (Left wrist). Skin: Negative for color change and rash. Neurological: Negative for dizziness and numbness. Physical Exam   Constitutional: She is oriented to person, place, and time. She appears well-developed and well-nourished. HENT:   Head: Normocephalic and atraumatic. Eyes: EOM are normal.   Neck: Normal range of motion. Neck supple. Cardiovascular: Normal rate. Pulmonary/Chest: Effort normal.   Abdominal: Soft. Musculoskeletal:        Left wrist: She exhibits decreased range of motion, tenderness (Positive fovea sign, tenderness over the ECU tendon.) and swelling. She exhibits no bony tenderness (No tenderness to radial ulnar styloid) and no effusion. Right Knee: Inspection: No evidence of obvious swelling, erythema or ecchymosis.   No effusion present. Palpation: Medial joint line tenderness. No lateral joint line tenderness. ROM: Extension: 5, Flexion: 115  Negative McMurrey's Both medially and Laterally  Ligamentous exam is grossly intact. Negative Lachman's, negative posterior drawer, and No laxity to varus or valgus stress. Neurological: She is alert and oriented to person, place, and time. Skin: Skin is warm and dry. No rash noted. Psychiatric: She has a normal mood and affect. Her behavior is normal.       Assessment:     1. Tendinitis of left wrist    2. Primary osteoarthritis of right knee        Plan:     Referral to physical therapy for extensor tendinitis of the left wrist.  To work on range of motion and strengthening of that left wrist.  And anti-inflammatory modalities if they see fit. Return next week for injection 2/5. Patient is instructed to keep activity tonight to a minimum after being injected with viscosupplementation today. Instructed to ice and elevated area tonight and return to normal activity as tolerated tomorrow. Patient is educated on appropriate length of time to allow for visco to start to take affect. Orders Placed This Encounter   Procedures    External Referral To Physical Therapy     Referral Priority:   Routine     Referral Type:   Eval and Treat     Referral Reason:   Specialty Services Required     Requested Specialty:   Physical Therapy     Number of Visits Requested:   [address] [place], [place]      Please note that this chart was generated using voicerecognition Dragon dictation software. Although every effort was made to ensurethe accuracy of this automated transcription, some errors in transcription may haveoccurred.

## 2019-09-20 ENCOUNTER — OFFICE VISIT (OUTPATIENT)
Dept: ORTHOPEDIC SURGERY | Age: 54
End: 2019-09-20
Payer: MEDICARE

## 2019-09-20 DIAGNOSIS — M25.561 CHRONIC PAIN OF RIGHT KNEE: Primary | ICD-10-CM

## 2019-09-20 DIAGNOSIS — M17.11 PRIMARY OSTEOARTHRITIS OF RIGHT KNEE: ICD-10-CM

## 2019-09-20 DIAGNOSIS — G89.29 CHRONIC PAIN OF RIGHT KNEE: Primary | ICD-10-CM

## 2019-09-20 PROCEDURE — 20610 DRAIN/INJ JOINT/BURSA W/O US: CPT | Performed by: PHYSICIAN ASSISTANT

## 2019-09-20 PROCEDURE — 99024 POSTOP FOLLOW-UP VISIT: CPT | Performed by: PHYSICIAN ASSISTANT

## 2019-09-20 RX ORDER — LIDOCAINE HYDROCHLORIDE 10 MG/ML
4 INJECTION, SOLUTION EPIDURAL; INFILTRATION; INTRACAUDAL; PERINEURAL ONCE
Status: COMPLETED | OUTPATIENT
Start: 2019-09-20 | End: 2019-09-20

## 2019-09-20 RX ADMIN — LIDOCAINE HYDROCHLORIDE 4 ML: 10 INJECTION, SOLUTION EPIDURAL; INFILTRATION; INTRACAUDAL; PERINEURAL at 10:35

## 2019-09-20 ASSESSMENT — ENCOUNTER SYMPTOMS
COUGH: 0
COLOR CHANGE: 0
RHINORRHEA: 0
EYES NEGATIVE: 1
NAUSEA: 0
VOMITING: 0

## 2019-09-20 NOTE — PROGRESS NOTES
Patient ID: Francene Gilford is a 47 y.o. female. Chief Complaint   Patient presents with    Follow-up     right knee        HPI  Please see previous clinic notes    [facility] returns today for repeat Supartz injection right knee. This is 2 out of 5 in the series. She has no complaints today however she states she has not noticed a difference from the first injection yet. Eyes any recent fever chills nausea or vomiting.     Past Medical History:   Diagnosis Date    Cancer [facility]) 1987    cervical    Chiari I malformation ([address]     Chronic back pain     Elevated liver enzymes 09/2016    Fibromyalgia     GERD (gastroesophageal reflux disease)     Headache     Hepatic steatosis     [place] (hard of hearing)     Kidney stones     Liver disease     Obesity     Osteoarthritis     PONV (postoperative nausea and vomiting)     Seizures (HCC)     non epileptic    Thyroid disease     hypothyroid    Urinary incontinence     UTI (urinary tract infection)     Von Willebrand disease ([address]     Wears glasses      Past Surgical History:   Procedure Laterality Date    APPENDECTOMY      BACK SURGERY      lumbar surgery    BRAIN SURGERY      CARPAL TUNNEL RELEASE Right     CHOLECYSTECTOMY      COLONOSCOPY      DILATION AND CURETTAGE OF UTERUS      few times    HYSTERECTOMY      KNEE ARTHROSCOPY Bilateral     AL KNEE SCOPE,DIAGNOSTIC Bilateral 11/2/2017    KNEE ARTHROSCOPY RIGHT WITH PARTIAL MEDIAL MENISECTOMY, SUBTOTAL LATERAL MENISECTOMY, AND DEBRIDEMENT OF ACL /  NEEDLE ASPIRATION LEFT KNEE performed by Loida Doll MD at [address] Right      Family History   Problem Relation Age of Onset    Kidney Disease Mother     Breast Cancer Mother         breast    Kidney Disease Sister     Breast Cancer Sister         breast    Kidney Disease Brother     Breast Cancer Maternal Grandmother         breast    Breast Cancer Paternal Grandmother         breast     Social History Occupational History    Not on file   Tobacco Use    Smoking status: Current Every Day Smoker     Packs/day: 0.25     Years: 20.00     Pack years: 5.00    Smokeless tobacco: Never Used   Substance and Sexual Activity    Alcohol use: No     Alcohol/week: 0.0 standard drinks    Drug use: No    Sexual activity: Not on file        Review of Systems   Constitutional: Negative for chills and fever. HENT: Negative for congestion and rhinorrhea. Eyes: Negative. Respiratory: Negative for cough. Cardiovascular: Negative for chest pain and leg swelling. Gastrointestinal: Negative for nausea and vomiting. Musculoskeletal: Positive for arthralgias (Right Knee). Skin: Negative for color change and rash. Neurological: Negative for dizziness and numbness. Physical Exam  No new physical examination is carried out today  Assessment:     1. Chronic pain of right knee    2. Primary osteoarthritis of right knee      An informed verbal consent for the procedure was obtained and risks including, but not limited to: allergy to medications, injection, bleeding, stiffness of joint, recurrence of symptoms, loss of function, swelling, drainage, irrigation, need for surgery and pseudo-septic inflammation, were explained to the patient. Also, discussed was the potential for further injections, irrigation and debridement and surgery. Alternate means of treatment have also been discussed with the patient.     Administrations This Visit     lidocaine PF 1 % injection 4 mL     Admin Date  09/20/2019  10:35 Action  Given Dose  4 mL Route  Intradermal Site  Knee Right Administered By  Inna [facility]    Ordering Provider:  CANDACE Swan    NDC:  8758-8977-95    Lot#:  [id number]    :  TXYOQ    Patient Supplied?:  No          sodium hyaluronate (SUPARTZ) injection 25 mg     Admin Date  09/20/2019  10:38 Action  Given Dose  25 mg Route  Intra-articular Site  Knee Right Administered By  Inna [facility]

## 2019-09-27 ENCOUNTER — OFFICE VISIT (OUTPATIENT)
Dept: ORTHOPEDIC SURGERY | Age: 54
End: 2019-09-27
Payer: MEDICARE

## 2019-09-27 VITALS — WEIGHT: 207.89 LBS | HEIGHT: 63 IN | BODY MASS INDEX: 36.84 KG/M2

## 2019-09-27 DIAGNOSIS — M25.561 CHRONIC PAIN OF RIGHT KNEE: Primary | ICD-10-CM

## 2019-09-27 DIAGNOSIS — G89.29 CHRONIC PAIN OF RIGHT KNEE: Primary | ICD-10-CM

## 2019-09-27 PROCEDURE — 99024 POSTOP FOLLOW-UP VISIT: CPT | Performed by: PHYSICIAN ASSISTANT

## 2019-09-27 RX ORDER — LIDOCAINE HYDROCHLORIDE 10 MG/ML
2 INJECTION, SOLUTION EPIDURAL; INFILTRATION; INTRACAUDAL; PERINEURAL ONCE
Status: COMPLETED | OUTPATIENT
Start: 2019-09-27 | End: 2019-09-27

## 2019-09-27 RX ADMIN — LIDOCAINE HYDROCHLORIDE 2 ML: 10 INJECTION, SOLUTION EPIDURAL; INFILTRATION; INTRACAUDAL; PERINEURAL at 10:20

## 2019-09-27 ASSESSMENT — ENCOUNTER SYMPTOMS
COUGH: 0
COLOR CHANGE: 0
NAUSEA: 0
RHINORRHEA: 0
EYES NEGATIVE: 1
VOMITING: 0

## 2019-10-04 ENCOUNTER — OFFICE VISIT (OUTPATIENT)
Dept: ORTHOPEDIC SURGERY | Age: 54
End: 2019-10-04
Payer: MEDICARE

## 2019-10-04 VITALS — BODY MASS INDEX: 36.84 KG/M2 | HEIGHT: 63 IN | WEIGHT: 207.89 LBS

## 2019-10-04 DIAGNOSIS — M17.11 PRIMARY OSTEOARTHRITIS OF RIGHT KNEE: ICD-10-CM

## 2019-10-04 DIAGNOSIS — M25.561 CHRONIC PAIN OF RIGHT KNEE: Primary | ICD-10-CM

## 2019-10-04 DIAGNOSIS — G89.29 CHRONIC PAIN OF RIGHT KNEE: Primary | ICD-10-CM

## 2019-10-04 PROCEDURE — 20610 DRAIN/INJ JOINT/BURSA W/O US: CPT | Performed by: PHYSICIAN ASSISTANT

## 2019-10-04 PROCEDURE — 99024 POSTOP FOLLOW-UP VISIT: CPT | Performed by: PHYSICIAN ASSISTANT

## 2019-10-04 RX ORDER — LIDOCAINE HYDROCHLORIDE 10 MG/ML
2 INJECTION, SOLUTION EPIDURAL; INFILTRATION; INTRACAUDAL; PERINEURAL ONCE
Status: COMPLETED | OUTPATIENT
Start: 2019-10-04 | End: 2019-10-04

## 2019-10-04 RX ADMIN — LIDOCAINE HYDROCHLORIDE 2 ML: 10 INJECTION, SOLUTION EPIDURAL; INFILTRATION; INTRACAUDAL; PERINEURAL at 10:50

## 2019-10-04 ASSESSMENT — ENCOUNTER SYMPTOMS
NAUSEA: 0
RHINORRHEA: 0
EYES NEGATIVE: 1
COLOR CHANGE: 0
COUGH: 0
VOMITING: 0

## 2019-10-11 ENCOUNTER — OFFICE VISIT (OUTPATIENT)
Dept: ORTHOPEDIC SURGERY | Age: 54
End: 2019-10-11
Payer: MEDICARE

## 2019-10-11 VITALS — BODY MASS INDEX: 36.32 KG/M2 | WEIGHT: 205 LBS | HEIGHT: 63 IN

## 2019-10-11 DIAGNOSIS — G89.29 CHRONIC PAIN OF RIGHT KNEE: Primary | ICD-10-CM

## 2019-10-11 DIAGNOSIS — M25.561 CHRONIC PAIN OF RIGHT KNEE: Primary | ICD-10-CM

## 2019-10-11 PROCEDURE — 20610 DRAIN/INJ JOINT/BURSA W/O US: CPT | Performed by: PHYSICIAN ASSISTANT

## 2019-10-11 PROCEDURE — 99024 POSTOP FOLLOW-UP VISIT: CPT | Performed by: PHYSICIAN ASSISTANT

## 2019-10-11 RX ORDER — LIDOCAINE HYDROCHLORIDE 10 MG/ML
2 INJECTION, SOLUTION EPIDURAL; INFILTRATION; INTRACAUDAL; PERINEURAL ONCE
Status: COMPLETED | OUTPATIENT
Start: 2019-10-11 | End: 2019-10-11

## 2019-10-11 RX ADMIN — LIDOCAINE HYDROCHLORIDE 2 ML: 10 INJECTION, SOLUTION EPIDURAL; INFILTRATION; INTRACAUDAL; PERINEURAL at 11:29

## 2019-10-11 ASSESSMENT — ENCOUNTER SYMPTOMS
NAUSEA: 0
EYES NEGATIVE: 1
RHINORRHEA: 0
COLOR CHANGE: 0
COUGH: 0
VOMITING: 0

## 2019-10-22 ENCOUNTER — TELEPHONE (OUTPATIENT)
Dept: ORTHOPEDIC SURGERY | Age: 54
End: 2019-10-22

## 2019-10-29 ENCOUNTER — TELEPHONE (OUTPATIENT)
Dept: ORTHOPEDIC SURGERY | Age: 54
End: 2019-10-29

## 2019-11-12 ENCOUNTER — OFFICE VISIT (OUTPATIENT)
Dept: ORTHOPEDIC SURGERY | Age: 54
End: 2019-11-12
Payer: MEDICARE

## 2019-11-12 VITALS — WEIGHT: 206 LBS | BODY MASS INDEX: 36.5 KG/M2 | HEIGHT: 63 IN

## 2019-11-12 DIAGNOSIS — M17.12 PRIMARY OSTEOARTHRITIS OF LEFT KNEE: Primary | ICD-10-CM

## 2019-11-12 PROCEDURE — 20610 DRAIN/INJ JOINT/BURSA W/O US: CPT | Performed by: PHYSICIAN ASSISTANT

## 2019-11-12 PROCEDURE — 99024 POSTOP FOLLOW-UP VISIT: CPT | Performed by: PHYSICIAN ASSISTANT

## 2019-11-12 RX ORDER — LIDOCAINE HYDROCHLORIDE 10 MG/ML
4 INJECTION, SOLUTION INFILTRATION; PERINEURAL ONCE
Status: COMPLETED | OUTPATIENT
Start: 2019-11-12 | End: 2019-11-12

## 2019-11-12 RX ADMIN — LIDOCAINE HYDROCHLORIDE 4 ML: 10 INJECTION, SOLUTION INFILTRATION; PERINEURAL at 13:04

## 2019-11-12 ASSESSMENT — ENCOUNTER SYMPTOMS
NAUSEA: 0
RHINORRHEA: 0
EYES NEGATIVE: 1
COLOR CHANGE: 0
COUGH: 0
VOMITING: 0

## 2019-11-19 ENCOUNTER — OFFICE VISIT (OUTPATIENT)
Dept: ORTHOPEDIC SURGERY | Age: 54
End: 2019-11-19
Payer: MEDICARE

## 2019-11-19 VITALS — BODY MASS INDEX: 37.53 KG/M2 | HEIGHT: 63 IN | WEIGHT: 211.8 LBS

## 2019-11-19 DIAGNOSIS — M22.41 PATELLOFEMORAL CHONDROSIS OF RIGHT KNEE: ICD-10-CM

## 2019-11-19 DIAGNOSIS — M17.12 PRIMARY OSTEOARTHRITIS OF LEFT KNEE: Primary | ICD-10-CM

## 2019-11-19 PROCEDURE — 99024 POSTOP FOLLOW-UP VISIT: CPT | Performed by: PHYSICIAN ASSISTANT

## 2019-11-19 PROCEDURE — 20610 DRAIN/INJ JOINT/BURSA W/O US: CPT | Performed by: PHYSICIAN ASSISTANT

## 2019-11-19 RX ORDER — LIDOCAINE HYDROCHLORIDE 10 MG/ML
2 INJECTION, SOLUTION EPIDURAL; INFILTRATION; INTRACAUDAL; PERINEURAL ONCE
Status: COMPLETED | OUTPATIENT
Start: 2019-11-19 | End: 2019-11-19

## 2019-11-19 RX ADMIN — LIDOCAINE HYDROCHLORIDE 2 ML: 10 INJECTION, SOLUTION EPIDURAL; INFILTRATION; INTRACAUDAL; PERINEURAL at 11:24

## 2019-11-19 ASSESSMENT — ENCOUNTER SYMPTOMS
COUGH: 0
COLOR CHANGE: 0
EYES NEGATIVE: 1
NAUSEA: 0
RHINORRHEA: 0
VOMITING: 0

## 2019-11-26 ENCOUNTER — OFFICE VISIT (OUTPATIENT)
Dept: ORTHOPEDIC SURGERY | Age: 54
End: 2019-11-26
Payer: MEDICARE

## 2019-11-26 VITALS — WEIGHT: 211.86 LBS | BODY MASS INDEX: 37.54 KG/M2 | HEIGHT: 63 IN

## 2019-11-26 DIAGNOSIS — M17.12 PRIMARY OSTEOARTHRITIS OF LEFT KNEE: Primary | ICD-10-CM

## 2019-11-26 PROCEDURE — 99024 POSTOP FOLLOW-UP VISIT: CPT | Performed by: PHYSICIAN ASSISTANT

## 2019-11-26 PROCEDURE — 20610 DRAIN/INJ JOINT/BURSA W/O US: CPT | Performed by: PHYSICIAN ASSISTANT

## 2019-11-26 RX ORDER — LIDOCAINE HYDROCHLORIDE 10 MG/ML
4 INJECTION, SOLUTION INFILTRATION; PERINEURAL ONCE
Status: COMPLETED | OUTPATIENT
Start: 2019-11-26 | End: 2019-11-26

## 2019-11-26 RX ADMIN — LIDOCAINE HYDROCHLORIDE 4 ML: 10 INJECTION, SOLUTION INFILTRATION; PERINEURAL at 10:56

## 2019-11-26 ASSESSMENT — ENCOUNTER SYMPTOMS
COUGH: 0
VOMITING: 0
COLOR CHANGE: 0
NAUSEA: 0
EYES NEGATIVE: 1
RHINORRHEA: 0

## 2019-12-03 ENCOUNTER — OFFICE VISIT (OUTPATIENT)
Dept: ORTHOPEDIC SURGERY | Age: 54
End: 2019-12-03
Payer: MEDICARE

## 2019-12-03 DIAGNOSIS — M17.12 PRIMARY OSTEOARTHRITIS OF LEFT KNEE: Primary | ICD-10-CM

## 2019-12-03 PROCEDURE — 99024 POSTOP FOLLOW-UP VISIT: CPT | Performed by: PHYSICIAN ASSISTANT

## 2019-12-03 PROCEDURE — 20610 DRAIN/INJ JOINT/BURSA W/O US: CPT | Performed by: PHYSICIAN ASSISTANT

## 2019-12-03 RX ORDER — LIDOCAINE HYDROCHLORIDE 10 MG/ML
4 INJECTION, SOLUTION INFILTRATION; PERINEURAL ONCE
Status: COMPLETED | OUTPATIENT
Start: 2019-12-03 | End: 2019-12-03

## 2019-12-03 RX ADMIN — LIDOCAINE HYDROCHLORIDE 4 ML: 10 INJECTION, SOLUTION INFILTRATION; PERINEURAL at 14:56

## 2019-12-03 ASSESSMENT — ENCOUNTER SYMPTOMS
COUGH: 0
COLOR CHANGE: 0
NAUSEA: 0
RHINORRHEA: 0
VOMITING: 0
EYES NEGATIVE: 1

## 2019-12-10 ENCOUNTER — OFFICE VISIT (OUTPATIENT)
Dept: ORTHOPEDIC SURGERY | Age: 54
End: 2019-12-10
Payer: MEDICARE

## 2019-12-10 VITALS — BODY MASS INDEX: 37.54 KG/M2 | HEIGHT: 63 IN | WEIGHT: 211.86 LBS

## 2019-12-10 DIAGNOSIS — M17.12 PRIMARY OSTEOARTHRITIS OF LEFT KNEE: Primary | ICD-10-CM

## 2019-12-10 PROCEDURE — 20610 DRAIN/INJ JOINT/BURSA W/O US: CPT | Performed by: PHYSICIAN ASSISTANT

## 2019-12-10 PROCEDURE — 99024 POSTOP FOLLOW-UP VISIT: CPT | Performed by: PHYSICIAN ASSISTANT

## 2019-12-10 ASSESSMENT — ENCOUNTER SYMPTOMS
EYES NEGATIVE: 1
NAUSEA: 0
RHINORRHEA: 0
COLOR CHANGE: 0
COUGH: 0
VOMITING: 0

## 2020-06-05 ENCOUNTER — OFFICE VISIT (OUTPATIENT)
Dept: ORTHOPEDIC SURGERY | Age: 55
End: 2020-06-05
Payer: MEDICARE

## 2020-06-05 PROCEDURE — 99213 OFFICE O/P EST LOW 20 MIN: CPT | Performed by: ORTHOPAEDIC SURGERY

## 2020-06-05 NOTE — PROGRESS NOTES
Behavior is normal. Thought content normal.  Nursing note and vitals reviewed. Labs and Imaging:     XR taken today:  Xr Knee Right (1-2 Views)    Result Date: 6/5/2020  X-rays taken today reviewed by me show standing AP of both knees and a lateral the right knee. Patient is noted to have significant degenerative joint disease of the right knee and essentially bone-on-bone apposition the medial compartment. She also has spur formation both medially and laterally. Her lateral view shows some significant patellofemoral arthrosis as well with superior and inferior patellar pole osteophytes. Patient's left knee had some very modest narrowing medially. Assessment and Plan:  1. Chronic pain of right knee    2. Primary osteoarthritis of left knee    3. Primary osteoarthritis of right knee        This is a 49 y.o. female who presents to the clinic today for follow up right knee pain. Patient has moderated to severe DJD of the knees bilaterally. She is having significant pain in her right knee at this time. The patient states that their arthritis prevents them from participating in their daily activities. They have failed conservative measures, including Multiple Visco and Cortizone  Injections as well as activity modification. Therefore, I feel the patient is an excellent candidate for [x] right[x] knee arthroplasty. This risks, benefits and expectations have been explained to the patient, as well as the typical pre-, intra-, and postoperative courses. Pending medical clearance via Hematologist.       Past History:    Current Outpatient Medications:     omeprazole (PRILOSEC) 40 MG delayed release capsule, TAKE 1 CAPSULE BY MOUTH EVERY DAY, Disp: 90 capsule, Rfl: 3    diazePAM (VALIUM) 5 MG tablet, Take 1 tablet by mouth every 8 hours as needed for Anxiety for up to 30 days. , Disp: 90 tablet, Rfl: 0    atorvastatin (LIPITOR) 20 MG tablet, TAKE 1 TABLET BY MOUTH EVERY DAY, Disp: , Rfl:     Omega-3 Fatty Relationship status: Not on file    Intimate partner violence     Fear of current or ex partner: Not on file     Emotionally abused: Not on file     Physically abused: Not on file     Forced sexual activity: Not on file   Other Topics Concern    Not on file   Social History Narrative    Not on file     Past Medical History:   Diagnosis Date    Cancer [facility]) 1987    cervical    [facility] I malformation [facility] [address]     Chronic back pain     Elevated liver enzymes 09/2016    Fibromyalgia     GERD (gastroesophageal reflux disease)     Headache     Hepatic steatosis     [place] (hard of hearing)     Kidney stones     Liver disease     Obesity     Osteoarthritis     PONV (postoperative nausea and vomiting)     Seizures ([facility] [address]     non epileptic    Thyroid disease     hypothyroid    Urinary incontinence     UTI (urinary tract infection)     Von Willebrand disease ([facility] [address]     Wears glasses      Past Surgical History:   Procedure Laterality Date    APPENDECTOMY      BACK SURGERY      lumbar surgery    BRAIN SURGERY      CARPAL TUNNEL RELEASE Right     CHOLECYSTECTOMY      COLONOSCOPY      DILATION AND CURETTAGE OF UTERUS      few times    HYSTERECTOMY      KNEE ARTHROSCOPY Bilateral     CO KNEE SCOPE,DIAGNOSTIC Bilateral 11/2/2017    KNEE ARTHROSCOPY RIGHT WITH PARTIAL MEDIAL MENISECTOMY, SUBTOTAL LATERAL MENISECTOMY, AND DEBRIDEMENT OF ACL /  NEEDLE ASPIRATION LEFT KNEE performed by Pao Mason MD at [address] [place] [address] Right      Family History   Problem Relation Age of Onset    Kidney Disease Mother     Breast Cancer Mother         breast    Kidney Disease Sister     Breast Cancer Sister         breast    Kidney Disease Brother     Breast Cancer Maternal Grandmother         breast    Breast Cancer Paternal Grandmother         breast          Scribe Attestation:  By signing my name below, Prabha ZUNIGA, attest that this documentation has been prepared under the direction and in the

## 2020-06-17 ENCOUNTER — HOSPITAL ENCOUNTER (OUTPATIENT)
Dept: PREADMISSION TESTING | Age: 55
Discharge: HOME | End: 2020-06-21
Payer: MEDICARE

## 2020-06-17 VITALS
BODY MASS INDEX: 35.14 KG/M2 | OXYGEN SATURATION: 97 % | DIASTOLIC BLOOD PRESSURE: 75 MMHG | WEIGHT: 198.3 LBS | HEIGHT: 63 IN | HEART RATE: 98 BPM | RESPIRATION RATE: 12 BRPM | SYSTOLIC BLOOD PRESSURE: 124 MMHG | TEMPERATURE: 97 F

## 2020-06-17 LAB
ABSOLUTE BANDS #: 0.35 K/UL (ref 0–1)
ABSOLUTE EOS #: 0 K/UL (ref 0–0.4)
ABSOLUTE IMMATURE GRANULOCYTE: ABNORMAL K/UL (ref 0–0.3)
ABSOLUTE LYMPH #: 1.77 K/UL (ref 1–4.8)
ABSOLUTE MONO #: 0.53 K/UL (ref 0.1–1.3)
ANION GAP SERPL CALCULATED.3IONS-SCNC: 16 MMOL/L (ref 9–17)
BANDS: 2 % (ref 0–10)
BASOPHILS # BLD: 0 % (ref 0–2)
BASOPHILS ABSOLUTE: 0 K/UL (ref 0–0.2)
BILIRUBIN URINE: NEGATIVE
BUN BLDV-MCNC: 10 MG/DL (ref 6–20)
BUN/CREAT BLD: ABNORMAL (ref 9–20)
CALCIUM SERPL-MCNC: 10 MG/DL (ref 8.6–10.4)
CHLORIDE BLD-SCNC: 95 MMOL/L (ref 98–107)
CO2: 26 MMOL/L (ref 20–31)
COLOR: YELLOW
COMMENT UA: NORMAL
CREAT SERPL-MCNC: 0.72 MG/DL (ref 0.5–0.9)
DIFFERENTIAL TYPE: ABNORMAL
EOSINOPHILS RELATIVE PERCENT: 0 % (ref 0–4)
GFR AFRICAN AMERICAN: >60 ML/MIN
GFR NON-AFRICAN AMERICAN: >60 ML/MIN
GFR SERPL CREATININE-BSD FRML MDRD: ABNORMAL ML/MIN/{1.73_M2}
GFR SERPL CREATININE-BSD FRML MDRD: ABNORMAL ML/MIN/{1.73_M2}
GLUCOSE BLD-MCNC: 110 MG/DL (ref 70–99)
GLUCOSE URINE: NEGATIVE
HCT VFR BLD CALC: 47.4 % (ref 36–46)
HEMOGLOBIN: 15.6 G/DL (ref 12–16)
IMMATURE GRANULOCYTES: ABNORMAL %
INR BLD: 1
KETONES, URINE: NEGATIVE
LEUKOCYTE ESTERASE, URINE: NEGATIVE
LYMPHOCYTES # BLD: 10 % (ref 24–44)
MCH RBC QN AUTO: 28.7 PG (ref 26–34)
MCHC RBC AUTO-ENTMCNC: 33 G/DL (ref 31–37)
MCV RBC AUTO: 87.1 FL (ref 80–100)
MONOCYTES # BLD: 3 % (ref 1–7)
MORPHOLOGY: ABNORMAL
MRSA, DNA, NASAL: ABNORMAL
NITRITE, URINE: NEGATIVE
NRBC AUTOMATED: ABNORMAL PER 100 WBC
PARTIAL THROMBOPLASTIN TIME: 32.7 SEC (ref 24–36)
PDW BLD-RTO: 15.8 % (ref 11.5–14.9)
PH UA: 7.5 (ref 5–8)
PLATELET # BLD: 399 K/UL (ref 150–450)
PLATELET ESTIMATE: ABNORMAL
PMV BLD AUTO: 9.8 FL (ref 6–12)
POTASSIUM SERPL-SCNC: 4.7 MMOL/L (ref 3.7–5.3)
PROTEIN UA: NEGATIVE
PROTHROMBIN TIME: 13 SEC (ref 11.8–14.6)
RBC # BLD: 5.44 M/UL (ref 4–5.2)
RBC # BLD: ABNORMAL 10*6/UL
SEG NEUTROPHILS: 85 % (ref 36–66)
SEGMENTED NEUTROPHILS ABSOLUTE COUNT: 15.05 K/UL (ref 1.3–9.1)
SODIUM BLD-SCNC: 137 MMOL/L (ref 135–144)
SPECIFIC GRAVITY UA: 1 (ref 1–1.03)
SPECIMEN DESCRIPTION: ABNORMAL
TURBIDITY: CLEAR
URINE HGB: NEGATIVE
UROBILINOGEN, URINE: NORMAL
WBC # BLD: 17.7 K/UL (ref 3.5–11)
WBC # BLD: ABNORMAL 10*3/UL

## 2020-06-17 PROCEDURE — 85730 THROMBOPLASTIN TIME PARTIAL: CPT

## 2020-06-17 PROCEDURE — 80048 BASIC METABOLIC PNL TOTAL CA: CPT

## 2020-06-17 PROCEDURE — 93005 ELECTROCARDIOGRAM TRACING: CPT | Performed by: ANESTHESIOLOGY

## 2020-06-17 PROCEDURE — 85025 COMPLETE CBC W/AUTO DIFF WBC: CPT

## 2020-06-17 PROCEDURE — 85610 PROTHROMBIN TIME: CPT

## 2020-06-17 PROCEDURE — 36415 COLL VENOUS BLD VENIPUNCTURE: CPT

## 2020-06-17 PROCEDURE — 81003 URINALYSIS AUTO W/O SCOPE: CPT

## 2020-06-17 PROCEDURE — 87641 MR-STAPH DNA AMP PROBE: CPT

## 2020-06-17 NOTE — H&P
partner: Not on file     Emotionally abused: Not on file     Physically abused: Not on file     Forced sexual activity: Not on file   Other Topics Concern    Not on file   Social History Narrative    Not on file       REVIEW OF SYSTEMS      Allergies   Allergen Reactions    Adhesive Tape     Meperidine Itching    Morphine Other (See Comments)    Nubain  [Nalbuphine Hcl] Other (See Comments)     Nubain    Propoxyphene     Tramadol        Current Outpatient Medications on File Prior to Encounter   Medication Sig Dispense Refill    atorvastatin (LIPITOR) 20 MG tablet TAKE 1 TABLET BY MOUTH EVERY DAY 30 tablet 11    omeprazole (PRILOSEC) 40 MG delayed release capsule TAKE 1 CAPSULE BY MOUTH EVERY DAY 90 capsule 3    diazePAM (VALIUM) 5 MG tablet Take 1 tablet by mouth every 8 hours as needed for Anxiety for up to 30 days.  90 tablet 0    Omega-3 Fatty Acids ([employer] FISH OIL) 1000 MG CAPS TAKE 2 TABLETS BY MOUTH EVERY DAY      liothyronine (CYTOMEL) 5 MCG tablet TAKE 1 TABLET BY MOUTH TWICE A DAY      KLOR-CON M20 20 MEQ extended release tablet TAKE 2 TABLETS BY MOUTH EVERY  tablet 3    valACYclovir (VALTREX) 500 MG tablet TAKE 1 TABLET BY MOUTH DAILY AS NEEDED (LESIONS) 90 tablet 3    tiZANidine (ZANAFLEX) 4 MG tablet TAKE 1 TABLET BY MOUTH AT BEDTIME  5    metFORMIN, OSM, (FORTAMET) 1000 MG extended release tablet TAKE 1 TABLET BY MOUTH EVERY DAY  1    VICTOZA 18 MG/3ML SOPN SC injection INJECT 1.2 MG UNDER THE SKIN ONCE DAILY  1    levothyroxine (SYNTHROID) 50 MCG tablet Take 50 mcg by mouth Daily      hydrocortisone 1 % cream Apply topically TID for up to 2 weeks 60 g 1    hydrochlorothiazide (HYDRODIURIL) 25 MG tablet TAKE 1 TABLET BY MOUTH EVERY DAY 90 tablet 3    predniSONE (DELTASONE) 5 MG tablet TAKE 1/2 TABLET BY MOUTH DAILY 30 tablet 11    ibuprofen (ADVIL;MOTRIN) 800 MG tablet TAKE 1 TABLET BY MOUTH EVERY 8 HOURS AS NEEDED FOR PAIN 90 tablet 11    nortriptyline (PAMELOR) 50 MG  Vitamin D deficiency 08/31/2018    Polymyalgia rheumatica ([facility] [address] 01/16/2018    [facility]     Acute gout of knee 08/15/2017    Hyperglycemia 01/06/2017    Gastroesophageal reflux disease without esophagitis 12/20/2016    Hepatic steatosis     Chiari I malformation (HCC)     Osteoarthritis     Liver disease     Chronic back pain     Fibromyalgia     Headache     Urinary incontinence     Obesity     Hepatocellular damage 09/15/2016    Hypokalemia 09/15/2016    Elevated liver enzymes 09/01/2016    Von Willebrand's disease ([facility] [address] 05/18/2015    Hypothyroid 05/18/2015    Arnold-Chiari syndrome without spina bifida or hydrocephalus ([facility] [address] [facility] 05/18/2015    Attention deficit disorder (ADD) without hyperactivity 05/18/2015           DOMINIK Ivy, BRIAN - CNP on 6/17/2020 at 8:49 AM

## 2020-06-17 NOTE — H&P (VIEW-ONLY)
HISTORY and [facility] [id number]       NAME:  Anabel Chiang  MRN: [id number]   YOB: 1965   Date: 6/17/2020   Age: 49 y.o. Gender: female       COMPLAINT AND PRESENT HISTORY:   Anabel Chiang is 49 y.o.,  female, undergoing preadmission testing for right   Chronic knee pain and osteoarthritis of right knee. Patient will be having:   KNEE TOTAL ARTHROPLASTY-Right . Patient is s/p right knee scope done in 2017. Patient has hx of multiple  Falls , including falling down the stairs or out of bed,   Patient C/O of pain swelling, stiffness, popping and cracking in the right Knee. Pt describes the pain as 7/10 in intensity at times, more with weight lifting. Onset of symptoms started 3 years ago. Patient has been under pain management  to help in pain relief. The knee does  buckle or give way under the patient that is contributory to her falls. No locking up of the knee. Pt reports a recent fall  just this morning falling out of bed,  Complaining of pain to the left knee as well.   Patient is using a quad cane       PAST MEDICAL HISTORY     Past Medical History:   Diagnosis Date    Cancer [facility]) 1987    cervical    Chiari I malformation ([facility]     Chronic back pain     Elevated liver enzymes 09/2016    Fibromyalgia     GERD (gastroesophageal reflux disease)     Headache     Hepatic steatosis     [place] (hard of hearing)     Kidney stones     Liver disease     Obesity     Osteoarthritis     PONV (postoperative nausea and vomiting)     Seizures (HCC)     non epileptic    Thyroid disease     hypothyroid    Urinary incontinence     UTI (urinary tract infection)     Von Willebrand disease ([address]     Wears glasses      SURGICAL HISTORY       Past Surgical History:   Procedure Laterality Date    APPENDECTOMY      BACK SURGERY      lumbar surgery    BRAIN SURGERY      CARPAL TUNNEL RELEASE Right     CHOLECYSTECTOMY      COLONOSCOPY      DILATION AND CURETTAGE OF UTERUS      few times    HYSTERECTOMY      KNEE ARTHROSCOPY Bilateral     [place] KNEE SCOPE,DIAGNOSTIC Bilateral 11/2/2017    KNEE ARTHROSCOPY RIGHT WITH PARTIAL MEDIAL MENISECTOMY, SUBTOTAL LATERAL MENISECTOMY, AND DEBRIDEMENT OF ACL /  NEEDLE ASPIRATION LEFT KNEE performed by Eddie Perrin MD at [address] Right        FAMILY HISTORY       Family History   Problem Relation Age of Onset    Kidney Disease Mother     Breast Cancer Mother         breast    Kidney Disease Sister     Breast Cancer Sister         breast    Kidney Disease Brother     Breast Cancer Maternal Grandmother         breast    Breast Cancer Paternal Grandmother         breast       SOCIAL HISTORY       Social History     Socioeconomic History    Marital status:      Spouse name: Not on file    Number of children: Not on file    Years of education: Not on file    Highest education level: Not on file   Occupational History    Not on file   Social Needs    Financial resource strain: Not on file    Food insecurity     Worry: Not on file     Inability: Not on file    Transportation needs     Medical: Not on file     Non-medical: Not on file   Tobacco Use    Smoking status: Current Every Day Smoker     Packs/day: 0.25     Years: 20.00     Pack years: 5.00    Smokeless tobacco: Never Used   Substance and Sexual Activity    Alcohol use: No     Alcohol/week: 0.0 standard drinks    Drug use: No    Sexual activity: Not on file   Lifestyle    Physical activity     Days per week: Not on file     Minutes per session: Not on file    Stress: Not on file   Relationships    Social connections     Talks on phone: Not on file     Gets together: Not on file     Attends Sikh service: Not on file     Active member of club or organization: Not on file     Attends meetings of clubs or organizations: Not on file     Relationship status: Not on file    Intimate partner violence     Fear of current or ex partner: Not on file     Emotionally abused: Not on file     Physically abused: Not on file     Forced sexual activity: Not on file   Other Topics Concern    Not on file   Social History Narrative    Not on file       REVIEW OF SYSTEMS      Allergies   Allergen Reactions    Adhesive Tape     Meperidine Itching    Morphine Other (See Comments)    Nubain  [Nalbuphine Hcl] Other (See Comments)     Nubain    Propoxyphene     Tramadol        Current Outpatient Medications on File Prior to Encounter   Medication Sig Dispense Refill    atorvastatin (LIPITOR) 20 MG tablet TAKE 1 TABLET BY MOUTH EVERY DAY 30 tablet 11    omeprazole (PRILOSEC) 40 MG delayed release capsule TAKE 1 CAPSULE BY MOUTH EVERY DAY 90 capsule 3    diazePAM (VALIUM) 5 MG tablet Take 1 tablet by mouth every 8 hours as needed for Anxiety for up to 30 days.  90 tablet 0    Omega-3 Fatty Acids ([employer] FISH OIL) 1000 MG CAPS TAKE 2 TABLETS BY MOUTH EVERY DAY      liothyronine (CYTOMEL) 5 MCG tablet TAKE 1 TABLET BY MOUTH TWICE A DAY      KLOR-CON M20 20 MEQ extended release tablet TAKE 2 TABLETS BY MOUTH EVERY  tablet 3    valACYclovir (VALTREX) 500 MG tablet TAKE 1 TABLET BY MOUTH DAILY AS NEEDED (LESIONS) 90 tablet 3    tiZANidine (ZANAFLEX) 4 MG tablet TAKE 1 TABLET BY MOUTH AT BEDTIME  5    metFORMIN, OSM, (FORTAMET) 1000 MG extended release tablet TAKE 1 TABLET BY MOUTH EVERY DAY  1    VICTOZA 18 MG/3ML SOPN SC injection INJECT 1.2 MG UNDER THE SKIN ONCE DAILY  1    levothyroxine (SYNTHROID) 50 MCG tablet Take 50 mcg by mouth Daily      hydrocortisone 1 % cream Apply topically TID for up to 2 weeks 60 g 1    hydrochlorothiazide (HYDRODIURIL) 25 MG tablet TAKE 1 TABLET BY MOUTH EVERY DAY 90 tablet 3    predniSONE (DELTASONE) 5 MG tablet TAKE 1/2 TABLET BY MOUTH DAILY 30 tablet 11    ibuprofen (ADVIL;MOTRIN) 800 MG tablet TAKE 1 TABLET BY MOUTH EVERY 8 HOURS AS NEEDED FOR PAIN 90 tablet 11    nortriptyline (PAMELOR) 50 MG capsule TAKE 3 CAPSULES BY MOUTH AT BEDTIME 270 capsule 3    lidocaine viscous hcl (XYLOCAINE) 2 % SOLN solution Apply with a swab to mouth lesions every hour as needed for pain 100 mL 2    Insulin Pen Needle (B-D ULTRAFINE III SHORT PEN) 31G X 8 MM MISC Inject 1 each into the skin daily May substitute with any brand 100 each 11    oxyCODONE (OXYCONTIN) 30 MG T12A extended release tablet Take 1 tablet by mouth every 12 hours.  triamcinolone (KENALOG) 0.025 % cream Apply topically 2 times daily. 80 g 0    oxyCODONE (OXYCONTIN) 10 MG extended release tablet Take 10 mg by mouth as needed for Pain.  Cholecalciferol 2000 UNITS TABS Take by mouth      Multiple Vitamins-Minerals (THERAPEUTIC MULTIVITAMIN-MINERALS) tablet Take 1 tablet by mouth daily      vitamin B-12 (CYANOCOBALAMIN) 500 MCG tablet Take 500 mcg by mouth daily      pregabalin (LYRICA) 150 MG capsule Take 1 capsule by mouth daily (Patient taking differently: Take 150 mg by mouth 2 times daily ) 60 capsule 2    Biotin 5 MG CAPS Take 1 capsule by mouth daily       No current facility-administered medications on file prior to encounter. General health:  Fairly good. No fever or chills. Skin:  No itching, redness or rash. HEENT:  No headache, epistaxis or sore throat. Neck:  No pain, stiffness or masses. Cardiovascular/Respiratory system:  No chest pain, palpitation or shortness of breath. Gastrointestinal tract: No abdominal pain, Dysphagia, nausea, vomiting, diarrhea or constipation. Genitourinary:  No burning on micturition. No hesitancy, urgency, frequency or discoloration of urine. Locomotor:                 Neuropsychiatric:  No referable complaints. GENERAL PHYSICAL EXAM:     Vitals: There were no vitals taken for this visit. There is no height or weight on file to calculate BMI.        GENERAL APPEARANCE:   Magdalena Millan is 49 y.o.,  female, moderately obese, nourished, conscious, alert. Does not appear to be distress or pain at this time. SKIN:  Warm, dry, no cyanosis or jaundice. HEAD:  Normocephalic, atraumatic, no swelling or tenderness. EYES:  Pupils equal, reactive to light. EARS:  No discharge, no marked hearing loss. NOSE:  No rhinorrhea, epistaxis or septal deformity. THROAT:  Not congested. No ulceration bleeding or discharge. NECK:  No stiffness, trachea central.                  CHEST:  Symmetrical and equal on expansion. HEART:  RRR S1 > S2. No audible murmurs or gallops. LUNGS:  Equal on expansion, normal breath sounds. No adventitious sounds. ABDOMEN:  Obese. Soft on palpation. No localized tenderness. No guarding or rigidity. No palpable hepatosplenomegaly. LYMPHATICS:  No palpable cervical lymphadenopathy. LOCOMOTOR, BACK AND SPINE:  No tenderness or deformities. Patient using a cane to aide in ambulation. EXTREMITIES:  Symmetrical, no pretibial edema. Karmens sign negative or no calf tenderness. No discoloration or ulcerations. Tenderness on palpation of the right Knee joint space worse on the lateral aspect. NEUROLOGIC:  The patient is conscious, alert, oriented,Cranial nerve II-XII intact, taste and smell were not examined. No apparent focal sensory or motor deficits.                                                                                           PROVISIONAL DIAGNOSES / SURGERY:       Chronic pain of right knee      Primary osteoarthritis of right knee              KNEE TOTAL ARTHROPLASTY Right                                                                                           Patient Active Problem List    Diagnosis Date Noted    BMI 38.0-38.9,adult 08/14/2019

## 2020-06-18 ENCOUNTER — ANESTHESIA EVENT (OUTPATIENT)
Dept: OPERATING ROOM | Age: 55
End: 2020-06-18
Payer: MEDICARE

## 2020-06-18 LAB
EKG ATRIAL RATE: 97 BPM
EKG P AXIS: 68 DEGREES
EKG P-R INTERVAL: 172 MS
EKG Q-T INTERVAL: 362 MS
EKG QRS DURATION: 90 MS
EKG QTC CALCULATION (BAZETT): 459 MS
EKG R AXIS: 25 DEGREES
EKG T AXIS: 48 DEGREES
EKG VENTRICULAR RATE: 97 BPM

## 2020-06-18 PROCEDURE — 93010 ELECTROCARDIOGRAM REPORT: CPT | Performed by: INTERNAL MEDICINE

## 2020-06-18 RX ORDER — SODIUM CHLORIDE 0.9 % (FLUSH) 0.9 %
10 SYRINGE (ML) INJECTION PRN
Status: CANCELLED | OUTPATIENT
Start: 2020-06-18

## 2020-06-18 RX ORDER — SODIUM CHLORIDE, SODIUM LACTATE, POTASSIUM CHLORIDE, CALCIUM CHLORIDE 600; 310; 30; 20 MG/100ML; MG/100ML; MG/100ML; MG/100ML
INJECTION, SOLUTION INTRAVENOUS CONTINUOUS
Status: CANCELLED | OUTPATIENT
Start: 2020-06-18

## 2020-06-18 RX ORDER — LIDOCAINE HYDROCHLORIDE 10 MG/ML
1 INJECTION, SOLUTION EPIDURAL; INFILTRATION; INTRACAUDAL; PERINEURAL
Status: CANCELLED | OUTPATIENT
Start: 2020-06-18 | End: 2020-06-18

## 2020-06-18 RX ORDER — SODIUM CHLORIDE 0.9 % (FLUSH) 0.9 %
10 SYRINGE (ML) INJECTION EVERY 12 HOURS SCHEDULED
Status: CANCELLED | OUTPATIENT
Start: 2020-06-18

## 2020-06-26 ENCOUNTER — HOSPITAL ENCOUNTER (OUTPATIENT)
Dept: PREADMISSION TESTING | Age: 55
Setting detail: SPECIMEN
Discharge: HOME | End: 2020-06-30
Payer: MEDICARE

## 2020-06-26 PROCEDURE — U0004 COV-19 TEST NON-CDC HGH THRU: HCPCS

## 2020-06-28 LAB
SARS-COV-2, PCR: NOT DETECTED
SARS-COV-2, RAPID: NORMAL
SARS-COV-2: NORMAL
SOURCE: NORMAL

## 2020-06-29 ENCOUNTER — TELEPHONE (OUTPATIENT)
Dept: PRIMARY CARE CLINIC | Age: 55
End: 2020-06-29

## 2020-06-29 RX ORDER — ASPIRIN 81 MG/1
81 TABLET ORAL 2 TIMES DAILY
Status: CANCELLED | OUTPATIENT
Start: 2020-06-29

## 2020-06-29 RX ORDER — SODIUM CHLORIDE 0.9 % (FLUSH) 0.9 %
10 SYRINGE (ML) INJECTION PRN
Status: CANCELLED | OUTPATIENT
Start: 2020-06-29

## 2020-06-29 RX ORDER — OXYCODONE HYDROCHLORIDE 5 MG/1
5 TABLET ORAL EVERY 4 HOURS PRN
Status: CANCELLED | OUTPATIENT
Start: 2020-06-29

## 2020-06-29 RX ORDER — ACETAMINOPHEN 325 MG/1
650 TABLET ORAL EVERY 6 HOURS
Status: CANCELLED | OUTPATIENT
Start: 2020-06-29

## 2020-06-29 RX ORDER — SODIUM CHLORIDE, SODIUM LACTATE, POTASSIUM CHLORIDE, CALCIUM CHLORIDE 600; 310; 30; 20 MG/100ML; MG/100ML; MG/100ML; MG/100ML
INJECTION, SOLUTION INTRAVENOUS CONTINUOUS
Status: CANCELLED | OUTPATIENT
Start: 2020-06-29

## 2020-06-29 RX ORDER — SODIUM CHLORIDE 0.9 % (FLUSH) 0.9 %
10 SYRINGE (ML) INJECTION EVERY 12 HOURS SCHEDULED
Status: CANCELLED | OUTPATIENT
Start: 2020-06-29

## 2020-06-29 RX ORDER — OXYCODONE HYDROCHLORIDE 5 MG/1
10 TABLET ORAL EVERY 4 HOURS PRN
Status: CANCELLED | OUTPATIENT
Start: 2020-06-29

## 2020-06-29 RX ORDER — SENNA AND DOCUSATE SODIUM 50; 8.6 MG/1; MG/1
1 TABLET, FILM COATED ORAL 2 TIMES DAILY
Status: CANCELLED | OUTPATIENT
Start: 2020-06-29

## 2020-06-30 ENCOUNTER — HOSPITAL ENCOUNTER (OUTPATIENT)
Age: 55
Setting detail: OUTPATIENT SURGERY
Discharge: HOME | End: 2020-06-30
Attending: ORTHOPAEDIC SURGERY | Admitting: ORTHOPAEDIC SURGERY
Payer: MEDICARE

## 2020-06-30 ENCOUNTER — ANESTHESIA (OUTPATIENT)
Dept: OPERATING ROOM | Age: 55
End: 2020-06-30
Payer: MEDICARE

## 2020-06-30 VITALS
RESPIRATION RATE: 14 BRPM | OXYGEN SATURATION: 98 % | TEMPERATURE: 96.8 F | SYSTOLIC BLOOD PRESSURE: 115 MMHG | DIASTOLIC BLOOD PRESSURE: 78 MMHG | HEART RATE: 90 BPM | WEIGHT: 198 LBS | HEIGHT: 63 IN | BODY MASS INDEX: 35.08 KG/M2

## 2020-06-30 LAB — GLUCOSE BLD-MCNC: 87 MG/DL (ref 65–105)

## 2020-06-30 PROCEDURE — 2580000003 HC RX 258: Performed by: ANESTHESIOLOGY

## 2020-06-30 PROCEDURE — 2580000003 HC RX 258: Performed by: ORTHOPAEDIC SURGERY

## 2020-06-30 PROCEDURE — 82947 ASSAY GLUCOSE BLOOD QUANT: CPT

## 2020-06-30 PROCEDURE — 6370000000 HC RX 637 (ALT 250 FOR IP): Performed by: ORTHOPAEDIC SURGERY

## 2020-06-30 PROCEDURE — 6360000002 HC RX W HCPCS: Performed by: ORTHOPAEDIC SURGERY

## 2020-06-30 RX ORDER — ACETAMINOPHEN 500 MG
1000 TABLET ORAL ONCE
Status: COMPLETED | OUTPATIENT
Start: 2020-06-30 | End: 2020-06-30

## 2020-06-30 RX ORDER — GABAPENTIN 400 MG/1
800 CAPSULE ORAL ONCE
Status: COMPLETED | OUTPATIENT
Start: 2020-06-30 | End: 2020-06-30

## 2020-06-30 RX ORDER — SCOLOPAMINE TRANSDERMAL SYSTEM 1 MG/1
1 PATCH, EXTENDED RELEASE TRANSDERMAL ONCE
Status: DISCONTINUED | OUTPATIENT
Start: 2020-06-30 | End: 2020-06-30 | Stop reason: HOSPADM

## 2020-06-30 RX ORDER — SODIUM CHLORIDE 0.9 % (FLUSH) 0.9 %
10 SYRINGE (ML) INJECTION EVERY 12 HOURS SCHEDULED
Status: DISCONTINUED | OUTPATIENT
Start: 2020-06-30 | End: 2020-06-30 | Stop reason: HOSPADM

## 2020-06-30 RX ORDER — DEXAMETHASONE SODIUM PHOSPHATE 10 MG/ML
10 INJECTION, SOLUTION INTRAMUSCULAR; INTRAVENOUS ONCE
Status: COMPLETED | OUTPATIENT
Start: 2020-06-30 | End: 2020-06-30

## 2020-06-30 RX ORDER — SODIUM CHLORIDE, SODIUM LACTATE, POTASSIUM CHLORIDE, CALCIUM CHLORIDE 600; 310; 30; 20 MG/100ML; MG/100ML; MG/100ML; MG/100ML
INJECTION, SOLUTION INTRAVENOUS CONTINUOUS
Status: DISCONTINUED | OUTPATIENT
Start: 2020-06-30 | End: 2020-06-30 | Stop reason: HOSPADM

## 2020-06-30 RX ORDER — SODIUM CHLORIDE 0.9 % (FLUSH) 0.9 %
10 SYRINGE (ML) INJECTION PRN
Status: DISCONTINUED | OUTPATIENT
Start: 2020-06-30 | End: 2020-06-30 | Stop reason: HOSPADM

## 2020-06-30 RX ORDER — LIDOCAINE HYDROCHLORIDE 10 MG/ML
1 INJECTION, SOLUTION EPIDURAL; INFILTRATION; INTRACAUDAL; PERINEURAL
Status: DISCONTINUED | OUTPATIENT
Start: 2020-06-30 | End: 2020-06-30 | Stop reason: HOSPADM

## 2020-06-30 RX ADMIN — SODIUM CHLORIDE, POTASSIUM CHLORIDE, SODIUM LACTATE AND CALCIUM CHLORIDE: 600; 310; 30; 20 INJECTION, SOLUTION INTRAVENOUS at 09:29

## 2020-06-30 RX ADMIN — GABAPENTIN 800 MG: 400 CAPSULE ORAL at 09:13

## 2020-06-30 RX ADMIN — DEXAMETHASONE SODIUM PHOSPHATE 10 MG: 10 INJECTION, SOLUTION INTRAMUSCULAR; INTRAVENOUS at 09:25

## 2020-06-30 RX ADMIN — ACETAMINOPHEN 1000 MG: 500 TABLET, FILM COATED ORAL at 09:13

## 2020-06-30 RX ADMIN — VANCOMYCIN HYDROCHLORIDE 1000 MG: 1 INJECTION, POWDER, LYOPHILIZED, FOR SOLUTION INTRAVENOUS at 09:41

## 2020-06-30 ASSESSMENT — PAIN DESCRIPTION - DESCRIPTORS: DESCRIPTORS: SHOOTING

## 2020-06-30 ASSESSMENT — PAIN - FUNCTIONAL ASSESSMENT
PAIN_FUNCTIONAL_ASSESSMENT: 0-10
PAIN_FUNCTIONAL_ASSESSMENT: PREVENTS OR INTERFERES SOME ACTIVE ACTIVITIES AND ADLS

## 2020-06-30 NOTE — ANESTHESIA PRE PROCEDURE
Department of Anesthesiology  Preprocedure Note       Name:  Alfredo Duff   Age:  49 y.o.  :  1965                                          MRN:  [id number]         Date:  2020      Surgeon: Tiffanie Espinosa):  Rob Tsai MD    Procedure: Procedure(s):  KNEE TOTAL ARTHROPLASTY    Medications prior to admission:   Prior to Admission medications    Medication Sig Start Date End Date Taking? Authorizing Provider   diazePAM (VALIUM) 5 MG tablet Take 1 tablet by mouth every 8 hours as needed for Anxiety for up to 30 days.  20 Yes Karlo Hogan MD   spironolactone (ALDACTONE) 50 MG tablet TAKE 1 TABLET BY MOUTH EVERY DAY 20  Yes Karlo Hogan MD   atorvastatin (LIPITOR) 20 MG tablet TAKE 1 TABLET BY MOUTH EVERY DAY 6/10/20  Yes Karlo Hogan MD   omeprazole (PRILOSEC) 40 MG delayed release capsule TAKE 1 CAPSULE BY MOUTH EVERY DAY 20  Yes Karlo Hogan MD   KLOR-CON M20 20 MEQ extended release tablet TAKE 2 TABLETS BY MOUTH EVERY DAY 1/10/20  Yes Karlo Hogan MD   tiZANidine (ZANAFLEX) 4 MG tablet TAKE 1 TABLET BY MOUTH AT BEDTIME 10/14/19  Yes Historical Provider, MD   metFORMIN, OSM, (FORTAMET) 1000 MG extended release tablet TAKE 1 TABLET BY MOUTH EVERY DAY 9/3/19  Yes Historical Provider, MD   VICTOZA 18 MG/3ML SOPN SC injection INJECT 1.2 MG UNDER THE SKIN ONCE DAILY 19  Yes Historical Provider, MD   hydrocortisone 1 % cream Apply topically TID for up to 2 weeks 10/7/19  Yes Zulema Lockett, APRN - DWAYNE   hydrochlorothiazide (HYDRODIURIL) 25 MG tablet TAKE 1 TABLET BY MOUTH EVERY DAY 19  Yes Karlo Hogan MD   predniSONE (DELTASONE) 5 MG tablet TAKE 1/2 TABLET BY MOUTH DAILY 19  Yes Karlo Hogan MD   nortriptyline (PAMELOR) 50 MG capsule TAKE 3 CAPSULES BY MOUTH AT BEDTIME 8/15/19  Yes Karlo Hogan MD   lidocaine viscous hcl (XYLOCAINE) 2 % SOLN solution Apply with a swab to mouth lesions every hour as needed for pain 7/3/19  Yes Sagar Iverson MD   oxyCODONE (OXYCONTIN) 30 MG T12A extended release tablet Take 1 tablet by mouth every 12 hours. Yes Historical Provider, MD   triamcinolone (KENALOG) 0.025 % cream Apply topically 2 times daily. 6/29/18  Yes Sagar Iverson MD   oxyCODONE (OXYCONTIN) 10 MG extended release tablet Take 10 mg by mouth as needed for Pain. Yes Historical Provider, MD   pregabalin (LYRICA) 150 MG capsule Take 1 capsule by mouth daily  Patient taking differently: Take 150 mg by mouth nightly.   6/10/16  Yes Sagar Iverson MD   ibuprofen (ADVIL;MOTRIN) 800 MG tablet Take 1 tablet by mouth every 8 hours as needed for Pain 6/18/20   Sagar Iverson MD   Omega-3 Fatty Acids ([employer] FISH OIL) 1000 MG CAPS TAKE 2 TABLETS BY MOUTH EVERY DAY 4/2/20   Erin Provider, MD   liothyronine (CYTOMEL) 5 MCG tablet TAKE 1 TABLET BY MOUTH TWICE A DAY 4/3/20   Historical Provider, MD   valACYclovir (VALTREX) 500 MG tablet TAKE 1 TABLET BY MOUTH DAILY AS NEEDED (LESIONS) 1/6/20   Sagar Iverson MD   levothyroxine (SYNTHROID) 50 MCG tablet Take 50 mcg by mouth Daily    Historical Provider, MD   Insulin Pen Needle (B-D ULTRAFINE III SHORT PEN) 31G X 8 MM MISC Inject 1 each into the skin daily May substitute with any brand 6/22/19   Sagar Iverson MD   Cholecalciferol 2000 UNITS TABS Take by mouth    Historical Provider, MD   Multiple Vitamins-Minerals (THERAPEUTIC MULTIVITAMIN-MINERALS) tablet Take 1 tablet by mouth daily    Historical Provider, MD   vitamin B-12 (CYANOCOBALAMIN) 500 MCG tablet Take 500 mcg by mouth daily    Historical Provider, MD   Biotin 5 MG CAPS Take 1 capsule by mouth daily    Historical Provider, MD       Current medications:    Current Facility-Administered Medications   Medication Dose Route Frequency Provider Last Rate Last Dose    scopolamine (TRANSDERM-SCOP) transdermal patch 1 patch  1 patch Transdermal Once Ginger Harrison MD   1 patch at 06/30/20 0886    lactated ringers infusion   Intravenous Continuous Wong Rebolledo  mL/hr at 06/30/20 0929      lidocaine PF 1 % injection 1 mL  1 mL Intradermal Once PRN Wong Rebolledo MD        sodium chloride flush 0.9 % injection 10 mL  10 mL Intravenous 2 times per day Wong Rebolledo MD        sodium chloride flush 0.9 % injection 10 mL  10 mL Intravenous PRN Wong Rebolledo MD        vancomycin 1000 mg IVPB in 250 mL D5W addavial  1,000 mg Intravenous Once Lee Ann Richards MD           Allergies:     Allergies   Allergen Reactions    Adhesive Tape     Meperidine Itching    Morphine Other (See Comments)    Nubain  [Nalbuphine Hcl] Other (See Comments)     Nubain    Propoxyphene     Tramadol        Problem List:    Patient Active Problem List   Diagnosis Code    [facility] D68.0    Hypothyroid E03.9    Arnold-Chiari syndrome without spina bifida or hydrocephalus ([facility] [address] Q07.00    Attention deficit disorder (ADD) without hyperactivity F98.8    Hepatocellular damage K76.9    Hypokalemia E87.6    Elevated liver enzymes R74.8    Chiari I malformation (HCC) G93.5    Osteoarthritis M19.90    Liver disease K76.9    Chronic back pain M54.9, G89.29    Fibromyalgia M79.7    Headache R51    Urinary incontinence R32    Obesity E66.9    Hepatic steatosis K76.0    Gastroesophageal reflux disease without esophagitis K21.9    Hyperglycemia R73.9    Acute gout of knee M10.9    Von Willebrand disease ([facility] D68.0    Polymyalgia rheumatica (HCC) M35.3    Vitamin D deficiency E55.9    BMI 38.0-38.9,adult Z68.38       Past Medical History:        Diagnosis Date    Acid indigestion     hx of    Anesthesia      difficult intubation due to cranio cervical fusion (intubation by fiber optic of glidescope (Dr. Alicia Awan [phone] orthopedic spinal surgion.)     Cancer [facility]) 1987    cervical    Chiari I malformation ([facility]     Chronic back pain     Difficult intubation     hx. of cranio-cervical fusion , intubation by fiber optic glidescopy    Elevated liver enzymes 09/2016    Fibromyalgia     GERD (gastroesophageal reflux disease)     Headache     Hepatic steatosis     [place] (hard of hearing)     noel. ears    Kidney stones     Liver disease     Obesity     Osteoarthritis     PONV (postoperative nausea and vomiting)     Seizures (HCC)     non epileptic, last     Thyroid disease     hypothyroid    Urinary incontinence     UTI (urinary tract infection)     Von Willebrand disease ([address]     Wears glasses        Past Surgical History:        Procedure Laterality Date    APPENDECTOMY      BACK SURGERY      lumbar surgery    BRAIN SURGERY      CARPAL TUNNEL RELEASE Right     CHOLECYSTECTOMY      COLONOSCOPY      DILATION AND CURETTAGE OF UTERUS      few times    HYSTERECTOMY      KNEE ARTHROSCOPY Bilateral     [place] KNEE SCOPE,DIAGNOSTIC Bilateral 11/2/2017    KNEE ARTHROSCOPY RIGHT WITH PARTIAL MEDIAL MENISECTOMY, SUBTOTAL LATERAL MENISECTOMY, AND DEBRIDEMENT OF ACL /  NEEDLE ASPIRATION LEFT KNEE performed by Olivia Miller MD at [facility]        Social History:    Social History     Tobacco Use    Smoking status: Current Every Day Smoker     Packs/day: 0.25     Years: 20.00     Pack years: 5.00    Smokeless tobacco: Never Used   Substance Use Topics    Alcohol use: No     Alcohol/week: 0.0 standard drinks                                Ready to quit: Not Answered  Counseling given: Not Answered      Vital Signs (Current):   Vitals:    06/30/20 0900   BP: 115/78   Pulse: 90   Resp: 14   Temp: 96.8 °F (36 °C)   TempSrc: Infrared   SpO2: 98%   Weight: 198 lb (89.8 kg)   Height: 5' 3\" (1.6 m)                                              BP Readings from Last 3 Encounters:   06/30/20 115/78   06/26/20 124/78   06/17/20 124/75       NPO Status: Time of last liquid consumption: 2300                        Time of last solid consumption: 2300 Date of last liquid consumption: 06/29/20                        Date of last solid food consumption: 06/29/20    BMI:   Wt Readings from Last 3 Encounters:   06/30/20 198 lb (89.8 kg)   06/26/20 196 lb 4 oz (89 kg)   06/17/20 198 lb 4.8 oz (89.9 kg)     Body mass index is 35.07 kg/m². CBC:   Lab Results   Component Value Date    WBC 17.7 06/17/2020    RBC 5.44 06/17/2020    HGB 15.6 06/17/2020    HCT 47.4 06/17/2020    MCV 87.1 06/17/2020    RDW 15.8 06/17/2020     06/17/2020       CMP:   Lab Results   Component Value Date     06/17/2020    K 4.7 06/17/2020    CL 95 06/17/2020    CO2 26 06/17/2020    BUN 10 06/17/2020    CREATININE 0.72 06/17/2020    GFRAA >60 06/17/2020    LABGLOM >60 06/17/2020    GLUCOSE 110 06/17/2020    CALCIUM 10.0 06/17/2020       POC Tests: No results for input(s): POCGLU, POCNA, POCK, POCCL, POCBUN, POCHEMO, POCHCT in the last 72 hours. Coags:   Lab Results   Component Value Date    PROTIME 13.0 06/17/2020    INR 1.0 06/17/2020    APTT 32.7 06/17/2020       HCG (If Applicable): No results found for: PREGTESTUR, PREGSERUM, HCG, HCGQUANT     ABGs: No results found for: PHART, PO2ART, GLZ9QYI, QGJ2FKV, BEART, G5UPTBJB     Type & Screen (If Applicable):  No results found for: LABABO, LABRH    Drug/Infectious Status (If Applicable):  No results found for: HIV, HEPCAB    COVID-19 Screening (If Applicable):   Lab Results   Component Value Date    COVID19 Not Detected 06/26/2020         Anesthesia Evaluation  Patient summary reviewed and Nursing notes reviewed no history of anesthetic complications (Patient did not experience any difficult intubation but was advised to have anesthesia use Glidescope or FOB for intubation cos of the neck fusion):    Airway: Mallampati: II  TM distance: >3 FB   Neck ROM: full  Mouth opening: > = 3 FB Dental: normal exam         Pulmonary:Negative Pulmonary ROS and normal exam  breath sounds clear to auscultation                             Cardiovascular:          ECG reviewed  Rhythm: regular  Rate: normal  Echocardiogram reviewed               ROS comment: Estimated ejection fraction is 50-55% . 12 L EKG - NSR     Neuro/Psych:   (+) seizures:, neuromuscular disease:, headaches:, psychiatric history:             ROS comment: H/o Chiari I malformation with hx. of cranio-cervical fusion   Fibromyalgia  [place] (hard of hearing) noel. ears   Attention deficit disorder (ADD) without hyperactivity  Polymyalgia rheumatica  GI/Hepatic/Renal:   (+) GERD:, liver disease (Hepatic steatosis):, renal disease: kidney stones,           Endo/Other:    (+) hypothyroidism, blood dyscrasia (Von Willebrand's disease ): arthritis: OA., .                  ROS comment: Obesity   gout of knee  Pt on steroids Abdominal:           Vascular:                                      Anesthesia Plan      spinal, general and regional     ASA 3     (Adductor Canal Block planned post op PSR)  Induction: intravenous. MIPS: Postoperative opioids intended and Prophylactic antiemetics administered. Anesthetic plan and risks discussed with patient. Plan discussed with CRNA. Nica's Hematologist had requested Humate P pre op and post op but unfortunately this was not available and after discussion with the hematologist (Dr Shawn Lopez) and the surgeon, the surgery was rescheduled. Pharmacy department was contacted and will let us know how best to make sure this medication is available for next time.         Lani Rebolledo MD   6/30/2020

## 2020-06-30 NOTE — INTERVAL H&P NOTE
Update History & Physical    The patient's History and Physical of June 17, 2020 was reviewed with the patient and I examined the patient. There was no change. Plan: The risks, benefits, expected outcome, and alternative to the recommended procedure have been discussed with the patient by Dr Mann Benavidez. Patient understands and wants to proceed with the procedure. Pt reports she has headache due to not taking her medications. Her pain to the right knee is 6/10. On exam, no chest pain or SOB. No fever or chills. No N/V/D. On exam, pt alert and oriented. HRRR. Lungs clear throughout. Trace pitting LE edema. COVID neg.       Electronically signed by BRIAN Javier CNP on 6/30/2020 at 8:34 AM

## 2020-07-07 ENCOUNTER — TELEPHONE (OUTPATIENT)
Dept: ORTHOPEDIC SURGERY | Age: 55
End: 2020-07-07

## 2020-07-07 NOTE — TELEPHONE ENCOUNTER
Patient called to request a phone call from Dr Yancy Levi regarding her surgery that was supposed to be last Monday. She states that it was cancelled because her blood clot medication was not ordered. She's recently found that the cost of this medication was approx $40k and needed her insurance to cover it. However, after speaking with her insurance company multiple times, she discovered that nothing from our office was submitted to them in regards to this blood clot medication. Patient is very upset because her leg is getting worse and swelling, and she's wanting to get this surgery back scheduled as soon as possible. Please have someone from the office reach out to her  to provide any updates.     Dr. Peña Master - [phone]

## 2020-07-07 NOTE — TELEPHONE ENCOUNTER
I spent 5 hours on the phone with [address] [facility] today to try and figure out the issue with her Humate P injection prior to surgery and post op. At first they instructed me that it needed to go through the [address] [place] [address],[address] (Phone [phone] Option #5) for approval.  The pharmacy said since the injection was being given inpatient it needed to be approved by the prior auth nurses (Phone [phone]) at [address] [place] [address]. After discussing it with the prior auth nurse as well as her supervisor I was told that the only way to get it in writing 100% that they would pay for this drug would be to ask for a pre-determination. We would need to send a letter requesting an urgent pre-determination for both her inpatient stay following surgery as well as her humate P injections. It would need to include the CPT code of the injections () as well as the medical information from the hematologist and Dr. Doyle Bliss. It would also need both [place] and Dr. Romel Lyons NPI and tax ID numbers. The letter would need to give a schedule for the drug administration and a reason for the inpatient stay and injections. The fax number for the pre-determination is [phone]. Do you want me to try and write this up and send it?

## 2020-07-08 NOTE — TELEPHONE ENCOUNTER
I don't think we have any other option. However, the patient and her hsband need to know, and appreciate, all that you/we are trying to do to rectify this situation. It is out of our hands, bu t we are tying to save them a huge expense if we don't have this clarified before surgery.

## 2020-07-27 ENCOUNTER — TELEPHONE (OUTPATIENT)
Dept: ORTHOPEDIC SURGERY | Age: 55
End: 2020-07-27

## 2020-07-27 NOTE — TELEPHONE ENCOUNTER
Patient called to state that there was an additional fax sent to the office from her insurance on 7/9/20 which contained a few additional questions that needed to be answered then sent back. She's requesting that the office recover the fax (which was verified by her insurance that they sent it) and complete it and return it so that she can get approved for her surgery. Please follow up with any additional questions or concerns to her .

## 2020-08-06 ENCOUNTER — TELEPHONE (OUTPATIENT)
Dept: ORTHOPEDIC SURGERY | Age: 55
End: 2020-08-06

## 2020-08-07 ASSESSMENT — KOOS JR
STRAIGHTENING KNEE FULLY: 2
RISING FROM SITTING: 2
STANDING UPRIGHT: 1
GOING UP OR DOWN STAIRS: 2
BENDING TO THE FLOOR TO PICK UP OBJECT: 2
TWISING OR PIVOTING ON KNEE: 3
HOW SEVERE IS YOUR KNEE STIFFNESS AFTER FIRST WAKING IN MORNING: 3

## 2020-08-07 ASSESSMENT — PROMIS GLOBAL HEALTH SCALE
IN GENERAL, HOW WOULD YOU RATE YOUR SATISFACTION WITH YOUR SOCIAL ACTIVITIES AND RELATIONSHIPS [ON A SCALE OF 1 (POOR) TO 5 (EXCELLENT)]?: 3
IN GENERAL, WOULD YOU SAY YOUR HEALTH IS...[ON A SCALE OF 1 (POOR) TO 5 (EXCELLENT)]: 2
HOW IS THE PROMIS V1.1 BEING ADMINISTERED?: 2
IN GENERAL, PLEASE RATE HOW WELL YOU CARRY OUT YOUR USUAL SOCIAL ACTIVITIES (INCLUDES ACTIVITIES AT HOME, AT WORK, AND IN YOUR COMMUNITY, AND RESPONSIBILITIES AS A PARENT, CHILD, SPOUSE, EMPLOYEE, FRIEND, ETC) [ON A SCALE OF 1 (POOR) TO 5 (EXCELLENT)]?: 3
IN GENERAL, WOULD YOU SAY YOUR QUALITY OF LIFE IS...[ON A SCALE OF 1 (POOR) TO 5 (EXCELLENT)]: 2
TO WHAT EXTENT ARE YOU ABLE TO CARRY OUT YOUR EVERYDAY PHYSICAL ACTIVITIES SUCH AS WALKING, CLIMBING STAIRS, CARRYING GROCERIES, OR MOVING A CHAIR [ON A SCALE OF 1 (NOT AT ALL) TO 5 (COMPLETELY)]?: 3
IN THE PAST 7 DAYS, HOW WOULD YOU RATE YOUR FATIGUE ON AVERAGE [ON A SCALE FROM 1 (NONE) TO 5 (VERY SEVERE)]?: 4
IN GENERAL, HOW WOULD YOU RATE YOUR PHYSICAL HEALTH [ON A SCALE OF 1 (POOR) TO 5 (EXCELLENT)]?: 2
IN THE PAST 7 DAYS, HOW WOULD YOU RATE YOUR PAIN ON AVERAGE [ON A SCALE FROM 0 (NO PAIN) TO 10 (WORST IMAGINABLE PAIN)]?: 6
IN THE PAST 7 DAYS, HOW OFTEN HAVE YOU BEEN BOTHERED BY EMOTIONAL PROBLEMS, SUCH AS FEELING ANXIOUS, DEPRESSED, OR IRRITABLE [ON A SCALE FROM 1 (NEVER) TO 5 (ALWAYS)]?: 2
IN GENERAL, HOW WOULD YOU RATE YOUR MENTAL HEALTH, INCLUDING YOUR MOOD AND YOUR ABILITY TO THINK [ON A SCALE OF 1 (POOR) TO 5 (EXCELLENT)]?: 3
WHO IS THE PERSON COMPLETING THE PROMIS V1.1 SURVEY?: 0

## 2020-08-20 ENCOUNTER — HOSPITAL ENCOUNTER (OUTPATIENT)
Dept: PREADMISSION TESTING | Age: 55
Setting detail: SPECIMEN
Discharge: HOME | End: 2020-08-24
Payer: MEDICARE

## 2020-08-20 PROCEDURE — U0003 INFECTIOUS AGENT DETECTION BY NUCLEIC ACID (DNA OR RNA); SEVERE ACUTE RESPIRATORY SYNDROME CORONAVIRUS 2 (SARS-COV-2) (CORONAVIRUS DISEASE [COVID-19]), AMPLIFIED PROBE TECHNIQUE, MAKING USE OF HIGH THROUGHPUT TECHNOLOGIES AS DESCRIBED BY CMS-2020-01-R: HCPCS

## 2020-08-22 LAB — SARS-COV-2, NAA: NOT DETECTED

## 2020-08-23 ENCOUNTER — TELEPHONE (OUTPATIENT)
Dept: FAMILY MEDICINE CLINIC | Age: 55
End: 2020-08-23

## 2020-08-24 ENCOUNTER — APPOINTMENT (OUTPATIENT)
Dept: GENERAL RADIOLOGY | Age: 55
End: 2020-08-24
Attending: ORTHOPAEDIC SURGERY
Payer: MEDICARE

## 2020-08-24 ENCOUNTER — ANESTHESIA EVENT (OUTPATIENT)
Dept: OPERATING ROOM | Age: 55
End: 2020-08-24
Payer: MEDICARE

## 2020-08-24 ENCOUNTER — ANESTHESIA (OUTPATIENT)
Dept: OPERATING ROOM | Age: 55
End: 2020-08-24
Payer: MEDICARE

## 2020-08-24 ENCOUNTER — HOSPITAL ENCOUNTER (OUTPATIENT)
Age: 55
Setting detail: OBSERVATION
Discharge: HOME HEALTH | End: 2020-08-26
Attending: ORTHOPAEDIC SURGERY | Admitting: ORTHOPAEDIC SURGERY
Payer: MEDICARE

## 2020-08-24 VITALS — DIASTOLIC BLOOD PRESSURE: 81 MMHG | OXYGEN SATURATION: 94 % | TEMPERATURE: 98.6 F | SYSTOLIC BLOOD PRESSURE: 151 MMHG

## 2020-08-24 PROBLEM — M17.11 PRIMARY OSTEOARTHRITIS OF RIGHT KNEE: Status: ACTIVE | Noted: 2020-08-24

## 2020-08-24 LAB — GLUCOSE BLD-MCNC: 81 MG/DL (ref 65–105)

## 2020-08-24 PROCEDURE — 2580000003 HC RX 258: Performed by: ANESTHESIOLOGY

## 2020-08-24 PROCEDURE — 7100000000 HC PACU RECOVERY - FIRST 15 MIN: Performed by: ORTHOPAEDIC SURGERY

## 2020-08-24 PROCEDURE — G0378 HOSPITAL OBSERVATION PER HR: HCPCS

## 2020-08-24 PROCEDURE — 2500000003 HC RX 250 WO HCPCS: Performed by: ORTHOPAEDIC SURGERY

## 2020-08-24 PROCEDURE — 73560 X-RAY EXAM OF KNEE 1 OR 2: CPT

## 2020-08-24 PROCEDURE — 6360000002 HC RX W HCPCS: Performed by: NURSE ANESTHETIST, CERTIFIED REGISTERED

## 2020-08-24 PROCEDURE — 2580000003 HC RX 258: Performed by: ORTHOPAEDIC SURGERY

## 2020-08-24 PROCEDURE — 6370000000 HC RX 637 (ALT 250 FOR IP): Performed by: FAMILY MEDICINE

## 2020-08-24 PROCEDURE — 85245 CLOT FACTOR VIII VW RISTOCTN: CPT

## 2020-08-24 PROCEDURE — 64447 NJX AA&/STRD FEMORAL NRV IMG: CPT | Performed by: ANESTHESIOLOGY

## 2020-08-24 PROCEDURE — 97162 PT EVAL MOD COMPLEX 30 MIN: CPT

## 2020-08-24 PROCEDURE — 6360000002 HC RX W HCPCS: Performed by: ANESTHESIOLOGY

## 2020-08-24 PROCEDURE — 6360000002 HC RX W HCPCS: Performed by: ORTHOPAEDIC SURGERY

## 2020-08-24 PROCEDURE — 82947 ASSAY GLUCOSE BLOOD QUANT: CPT

## 2020-08-24 PROCEDURE — 7100000001 HC PACU RECOVERY - ADDTL 15 MIN: Performed by: ORTHOPAEDIC SURGERY

## 2020-08-24 PROCEDURE — 6370000000 HC RX 637 (ALT 250 FOR IP): Performed by: ORTHOPAEDIC SURGERY

## 2020-08-24 PROCEDURE — C1713 ANCHOR/SCREW BN/BN,TIS/BN: HCPCS | Performed by: ORTHOPAEDIC SURGERY

## 2020-08-24 PROCEDURE — 36415 COLL VENOUS BLD VENIPUNCTURE: CPT

## 2020-08-24 PROCEDURE — 97166 OT EVAL MOD COMPLEX 45 MIN: CPT

## 2020-08-24 PROCEDURE — 2500000003 HC RX 250 WO HCPCS: Performed by: NURSE ANESTHETIST, CERTIFIED REGISTERED

## 2020-08-24 PROCEDURE — 2500000003 HC RX 250 WO HCPCS: Performed by: ANESTHESIOLOGY

## 2020-08-24 PROCEDURE — 97535 SELF CARE MNGMENT TRAINING: CPT

## 2020-08-24 PROCEDURE — 3700000001 HC ADD 15 MINUTES (ANESTHESIA): Performed by: ORTHOPAEDIC SURGERY

## 2020-08-24 PROCEDURE — C1776 JOINT DEVICE (IMPLANTABLE): HCPCS | Performed by: ORTHOPAEDIC SURGERY

## 2020-08-24 PROCEDURE — 3700000000 HC ANESTHESIA ATTENDED CARE: Performed by: ORTHOPAEDIC SURGERY

## 2020-08-24 PROCEDURE — 97116 GAIT TRAINING THERAPY: CPT

## 2020-08-24 PROCEDURE — C9290 INJ, BUPIVACAINE LIPOSOME: HCPCS | Performed by: ANESTHESIOLOGY

## 2020-08-24 PROCEDURE — 27447 TOTAL KNEE ARTHROPLASTY: CPT | Performed by: ORTHOPAEDIC SURGERY

## 2020-08-24 PROCEDURE — 3600000003 HC SURGERY LEVEL 3 BASE: Performed by: ORTHOPAEDIC SURGERY

## 2020-08-24 PROCEDURE — 2720000010 HC SURG SUPPLY STERILE: Performed by: ORTHOPAEDIC SURGERY

## 2020-08-24 PROCEDURE — 2709999900 HC NON-CHARGEABLE SUPPLY: Performed by: ORTHOPAEDIC SURGERY

## 2020-08-24 PROCEDURE — 3600000013 HC SURGERY LEVEL 3 ADDTL 15MIN: Performed by: ORTHOPAEDIC SURGERY

## 2020-08-24 DEVICE — TRAY TIB L67MM KNEE CO CHROM FIN MOD INTLOK VANGUARD: Type: IMPLANTABLE DEVICE | Site: KNEE | Status: FUNCTIONAL

## 2020-08-24 DEVICE — COMPONENT PAT DIA34MM THK7.8MM THN KNEE POLY 3 PEG SER A: Type: IMPLANTABLE DEVICE | Site: KNEE | Status: FUNCTIONAL

## 2020-08-24 DEVICE — IMPLANTABLE DEVICE: Type: IMPLANTABLE DEVICE | Site: KNEE | Status: FUNCTIONAL

## 2020-08-24 DEVICE — CEMENT BNE 40GM HI VISC RADPQ FOR REV SURG: Type: IMPLANTABLE DEVICE | Site: KNEE | Status: FUNCTIONAL

## 2020-08-24 RX ORDER — DIPHENHYDRAMINE HYDROCHLORIDE 25 MG/1
1 TABLET ORAL DAILY
Status: DISCONTINUED | OUTPATIENT
Start: 2020-08-24 | End: 2020-08-24 | Stop reason: RX

## 2020-08-24 RX ORDER — OXYCODONE HYDROCHLORIDE AND ACETAMINOPHEN 5; 325 MG/1; MG/1
2 TABLET ORAL PRN
Status: DISCONTINUED | OUTPATIENT
Start: 2020-08-24 | End: 2020-08-24 | Stop reason: HOSPADM

## 2020-08-24 RX ORDER — GABAPENTIN 600 MG/1
800 TABLET ORAL ONCE
Status: COMPLETED | OUTPATIENT
Start: 2020-08-24 | End: 2020-08-24

## 2020-08-24 RX ORDER — NORTRIPTYLINE HYDROCHLORIDE 50 MG/1
50 CAPSULE ORAL NIGHTLY
Status: DISCONTINUED | OUTPATIENT
Start: 2020-08-24 | End: 2020-08-26 | Stop reason: HOSPADM

## 2020-08-24 RX ORDER — DEXAMETHASONE SODIUM PHOSPHATE 4 MG/ML
INJECTION, SOLUTION INTRA-ARTICULAR; INTRALESIONAL; INTRAMUSCULAR; INTRAVENOUS; SOFT TISSUE PRN
Status: DISCONTINUED | OUTPATIENT
Start: 2020-08-24 | End: 2020-08-24 | Stop reason: SDUPTHER

## 2020-08-24 RX ORDER — SODIUM CHLORIDE 9 MG/ML
INJECTION, SOLUTION INTRAVENOUS CONTINUOUS
Status: DISCONTINUED | OUTPATIENT
Start: 2020-08-24 | End: 2020-08-24

## 2020-08-24 RX ORDER — METOCLOPRAMIDE HYDROCHLORIDE 5 MG/ML
INJECTION INTRAMUSCULAR; INTRAVENOUS PRN
Status: DISCONTINUED | OUTPATIENT
Start: 2020-08-24 | End: 2020-08-24 | Stop reason: SDUPTHER

## 2020-08-24 RX ORDER — ROCURONIUM BROMIDE 10 MG/ML
INJECTION, SOLUTION INTRAVENOUS PRN
Status: DISCONTINUED | OUTPATIENT
Start: 2020-08-24 | End: 2020-08-24 | Stop reason: SDUPTHER

## 2020-08-24 RX ORDER — PROPOFOL 10 MG/ML
INJECTION, EMULSION INTRAVENOUS PRN
Status: DISCONTINUED | OUTPATIENT
Start: 2020-08-24 | End: 2020-08-24 | Stop reason: SDUPTHER

## 2020-08-24 RX ORDER — SENNA AND DOCUSATE SODIUM 50; 8.6 MG/1; MG/1
1 TABLET, FILM COATED ORAL 2 TIMES DAILY
Status: DISCONTINUED | OUTPATIENT
Start: 2020-08-24 | End: 2020-08-26 | Stop reason: HOSPADM

## 2020-08-24 RX ORDER — ATORVASTATIN CALCIUM 20 MG/1
20 TABLET, FILM COATED ORAL DAILY
Status: DISCONTINUED | OUTPATIENT
Start: 2020-08-24 | End: 2020-08-26 | Stop reason: HOSPADM

## 2020-08-24 RX ORDER — PANTOPRAZOLE SODIUM 40 MG/1
40 TABLET, DELAYED RELEASE ORAL
Status: DISCONTINUED | OUTPATIENT
Start: 2020-08-25 | End: 2020-08-26 | Stop reason: HOSPADM

## 2020-08-24 RX ORDER — BUPIVACAINE HYDROCHLORIDE 5 MG/ML
INJECTION, SOLUTION EPIDURAL; INTRACAUDAL
Status: DISCONTINUED | OUTPATIENT
Start: 2020-08-24 | End: 2020-08-24 | Stop reason: SDUPTHER

## 2020-08-24 RX ORDER — ONDANSETRON 2 MG/ML
INJECTION INTRAMUSCULAR; INTRAVENOUS PRN
Status: DISCONTINUED | OUTPATIENT
Start: 2020-08-24 | End: 2020-08-24 | Stop reason: SDUPTHER

## 2020-08-24 RX ORDER — LIOTHYRONINE SODIUM 5 UG/1
5 TABLET ORAL EVERY OTHER DAY
Status: DISCONTINUED | OUTPATIENT
Start: 2020-08-24 | End: 2020-08-26 | Stop reason: HOSPADM

## 2020-08-24 RX ORDER — SPIRONOLACTONE 25 MG/1
50 TABLET ORAL DAILY
Status: DISCONTINUED | OUTPATIENT
Start: 2020-08-24 | End: 2020-08-26 | Stop reason: HOSPADM

## 2020-08-24 RX ORDER — HYDROMORPHONE HCL 110MG/55ML
PATIENT CONTROLLED ANALGESIA SYRINGE INTRAVENOUS PRN
Status: DISCONTINUED | OUTPATIENT
Start: 2020-08-24 | End: 2020-08-24 | Stop reason: SDUPTHER

## 2020-08-24 RX ORDER — LIDOCAINE HYDROCHLORIDE 10 MG/ML
INJECTION, SOLUTION EPIDURAL; INFILTRATION; INTRACAUDAL; PERINEURAL PRN
Status: DISCONTINUED | OUTPATIENT
Start: 2020-08-24 | End: 2020-08-24 | Stop reason: SDUPTHER

## 2020-08-24 RX ORDER — VITAMIN B COMPLEX
2000 TABLET ORAL DAILY
Status: DISCONTINUED | OUTPATIENT
Start: 2020-08-24 | End: 2020-08-26 | Stop reason: HOSPADM

## 2020-08-24 RX ORDER — ACETAMINOPHEN 325 MG/1
650 TABLET ORAL EVERY 6 HOURS
Status: DISCONTINUED | OUTPATIENT
Start: 2020-08-24 | End: 2020-08-24

## 2020-08-24 RX ORDER — HYDROCHLOROTHIAZIDE 25 MG/1
25 TABLET ORAL DAILY
Status: DISCONTINUED | OUTPATIENT
Start: 2020-08-24 | End: 2020-08-26 | Stop reason: HOSPADM

## 2020-08-24 RX ORDER — METOPROLOL TARTRATE 5 MG/5ML
INJECTION INTRAVENOUS PRN
Status: DISCONTINUED | OUTPATIENT
Start: 2020-08-24 | End: 2020-08-24 | Stop reason: SDUPTHER

## 2020-08-24 RX ORDER — OXYCODONE HYDROCHLORIDE 10 MG/1
10 TABLET ORAL EVERY 4 HOURS PRN
Status: DISCONTINUED | OUTPATIENT
Start: 2020-08-24 | End: 2020-08-26 | Stop reason: HOSPADM

## 2020-08-24 RX ORDER — ACETAMINOPHEN 500 MG
1000 TABLET ORAL ONCE
Status: DISCONTINUED | OUTPATIENT
Start: 2020-08-24 | End: 2020-08-24 | Stop reason: HOSPADM

## 2020-08-24 RX ORDER — OXYCODONE HYDROCHLORIDE AND ACETAMINOPHEN 5; 325 MG/1; MG/1
1 TABLET ORAL PRN
Status: DISCONTINUED | OUTPATIENT
Start: 2020-08-24 | End: 2020-08-24 | Stop reason: HOSPADM

## 2020-08-24 RX ORDER — TIZANIDINE 4 MG/1
4 TABLET ORAL EVERY 8 HOURS PRN
Status: DISCONTINUED | OUTPATIENT
Start: 2020-08-24 | End: 2020-08-26 | Stop reason: HOSPADM

## 2020-08-24 RX ORDER — SODIUM CHLORIDE 0.9 % (FLUSH) 0.9 %
10 SYRINGE (ML) INJECTION EVERY 12 HOURS SCHEDULED
Status: DISCONTINUED | OUTPATIENT
Start: 2020-08-24 | End: 2020-08-26 | Stop reason: HOSPADM

## 2020-08-24 RX ORDER — DEXAMETHASONE SODIUM PHOSPHATE 10 MG/ML
10 INJECTION INTRAMUSCULAR; INTRAVENOUS ONCE
Status: COMPLETED | OUTPATIENT
Start: 2020-08-24 | End: 2020-08-24

## 2020-08-24 RX ORDER — POTASSIUM CHLORIDE 20 MEQ/1
40 TABLET, EXTENDED RELEASE ORAL
Status: DISCONTINUED | OUTPATIENT
Start: 2020-08-25 | End: 2020-08-26 | Stop reason: HOSPADM

## 2020-08-24 RX ORDER — SODIUM CHLORIDE 0.9 % (FLUSH) 0.9 %
10 SYRINGE (ML) INJECTION PRN
Status: DISCONTINUED | OUTPATIENT
Start: 2020-08-24 | End: 2020-08-26 | Stop reason: HOSPADM

## 2020-08-24 RX ORDER — OMEGA-3-ACID ETHYL ESTERS 1 G/1
2000 CAPSULE, LIQUID FILLED ORAL DAILY
Status: DISCONTINUED | OUTPATIENT
Start: 2020-08-24 | End: 2020-08-26 | Stop reason: HOSPADM

## 2020-08-24 RX ORDER — CHOLECALCIFEROL (VITAMIN D3) 125 MCG
500 CAPSULE ORAL DAILY
Status: DISCONTINUED | OUTPATIENT
Start: 2020-08-24 | End: 2020-08-26 | Stop reason: HOSPADM

## 2020-08-24 RX ORDER — OXYCODONE HYDROCHLORIDE 10 MG/1
10 TABLET ORAL DAILY PRN
Status: DISCONTINUED | OUTPATIENT
Start: 2020-08-24 | End: 2020-08-24

## 2020-08-24 RX ORDER — FENTANYL CITRATE 50 UG/ML
INJECTION, SOLUTION INTRAMUSCULAR; INTRAVENOUS PRN
Status: DISCONTINUED | OUTPATIENT
Start: 2020-08-24 | End: 2020-08-24 | Stop reason: SDUPTHER

## 2020-08-24 RX ORDER — SODIUM CHLORIDE, SODIUM LACTATE, POTASSIUM CHLORIDE, CALCIUM CHLORIDE 600; 310; 30; 20 MG/100ML; MG/100ML; MG/100ML; MG/100ML
INJECTION, SOLUTION INTRAVENOUS CONTINUOUS
Status: DISCONTINUED | OUTPATIENT
Start: 2020-08-24 | End: 2020-08-26 | Stop reason: HOSPADM

## 2020-08-24 RX ORDER — DIPHENHYDRAMINE HYDROCHLORIDE 50 MG/ML
12.5 INJECTION INTRAMUSCULAR; INTRAVENOUS
Status: DISCONTINUED | OUTPATIENT
Start: 2020-08-24 | End: 2020-08-24 | Stop reason: HOSPADM

## 2020-08-24 RX ORDER — M-VIT,TX,IRON,MINS/CALC/FOLIC 27MG-0.4MG
1 TABLET ORAL DAILY
Status: DISCONTINUED | OUTPATIENT
Start: 2020-08-24 | End: 2020-08-26 | Stop reason: HOSPADM

## 2020-08-24 RX ORDER — MIDAZOLAM HYDROCHLORIDE 1 MG/ML
INJECTION INTRAMUSCULAR; INTRAVENOUS PRN
Status: DISCONTINUED | OUTPATIENT
Start: 2020-08-24 | End: 2020-08-24 | Stop reason: SDUPTHER

## 2020-08-24 RX ORDER — SCOLOPAMINE TRANSDERMAL SYSTEM 1 MG/1
1 PATCH, EXTENDED RELEASE TRANSDERMAL ONCE
Status: DISCONTINUED | OUTPATIENT
Start: 2020-08-24 | End: 2020-08-24

## 2020-08-24 RX ORDER — PREGABALIN 150 MG/1
150 CAPSULE ORAL NIGHTLY
Status: DISCONTINUED | OUTPATIENT
Start: 2020-08-24 | End: 2020-08-26 | Stop reason: HOSPADM

## 2020-08-24 RX ORDER — PREDNISONE 1 MG/1
2.5 TABLET ORAL DAILY
Status: DISCONTINUED | OUTPATIENT
Start: 2020-08-24 | End: 2020-08-26 | Stop reason: HOSPADM

## 2020-08-24 RX ORDER — TRANEXAMIC ACID 100 MG/ML
INJECTION, SOLUTION INTRAVENOUS PRN
Status: DISCONTINUED | OUTPATIENT
Start: 2020-08-24 | End: 2020-08-24 | Stop reason: SDUPTHER

## 2020-08-24 RX ORDER — CALCIUM CHLORIDE 100 MG/ML
INJECTION INTRAVENOUS; INTRAVENTRICULAR PRN
Status: DISCONTINUED | OUTPATIENT
Start: 2020-08-24 | End: 2020-08-24 | Stop reason: ALTCHOICE

## 2020-08-24 RX ORDER — OXYCODONE HYDROCHLORIDE 15 MG/1
30 TABLET, FILM COATED, EXTENDED RELEASE ORAL EVERY 12 HOURS
Status: DISCONTINUED | OUTPATIENT
Start: 2020-08-24 | End: 2020-08-26 | Stop reason: HOSPADM

## 2020-08-24 RX ORDER — OXYCODONE HYDROCHLORIDE 10 MG/1
10 TABLET ORAL DAILY PRN
COMMUNITY
End: 2020-09-04

## 2020-08-24 RX ORDER — ONDANSETRON 2 MG/ML
4 INJECTION INTRAMUSCULAR; INTRAVENOUS
Status: DISCONTINUED | OUTPATIENT
Start: 2020-08-24 | End: 2020-08-24 | Stop reason: HOSPADM

## 2020-08-24 RX ORDER — LABETALOL HYDROCHLORIDE 5 MG/ML
5 INJECTION, SOLUTION INTRAVENOUS EVERY 10 MIN PRN
Status: DISCONTINUED | OUTPATIENT
Start: 2020-08-24 | End: 2020-08-24 | Stop reason: HOSPADM

## 2020-08-24 RX ORDER — OXYCODONE HYDROCHLORIDE 5 MG/1
5 TABLET ORAL EVERY 4 HOURS PRN
Status: DISCONTINUED | OUTPATIENT
Start: 2020-08-24 | End: 2020-08-26 | Stop reason: HOSPADM

## 2020-08-24 RX ORDER — LEVOTHYROXINE SODIUM 0.05 MG/1
50 TABLET ORAL DAILY
Status: DISCONTINUED | OUTPATIENT
Start: 2020-08-24 | End: 2020-08-26 | Stop reason: HOSPADM

## 2020-08-24 RX ORDER — METFORMIN HYDROCHLORIDE 500 MG/1
1000 TABLET, EXTENDED RELEASE ORAL DAILY
Status: DISCONTINUED | OUTPATIENT
Start: 2020-08-24 | End: 2020-08-26 | Stop reason: HOSPADM

## 2020-08-24 RX ADMIN — METOROPROLOL TARTRATE 2.5 MG: 5 INJECTION, SOLUTION INTRAVENOUS at 10:45

## 2020-08-24 RX ADMIN — SUGAMMADEX 200 MG: 100 INJECTION, SOLUTION INTRAVENOUS at 11:47

## 2020-08-24 RX ADMIN — HYDROMORPHONE HYDROCHLORIDE 0.5 MG: 2 INJECTION, SOLUTION INTRAMUSCULAR; INTRAVENOUS; SUBCUTANEOUS at 11:45

## 2020-08-24 RX ADMIN — PROPOFOL 50 MG: 10 INJECTION, EMULSION INTRAVENOUS at 11:45

## 2020-08-24 RX ADMIN — FENTANYL CITRATE 50 MCG: 50 INJECTION, SOLUTION INTRAMUSCULAR; INTRAVENOUS at 10:38

## 2020-08-24 RX ADMIN — METOROPROLOL TARTRATE 2.5 MG: 5 INJECTION, SOLUTION INTRAVENOUS at 10:42

## 2020-08-24 RX ADMIN — LIDOCAINE HYDROCHLORIDE 50 MG: 10 INJECTION, SOLUTION EPIDURAL; INFILTRATION; INTRACAUDAL; PERINEURAL at 10:14

## 2020-08-24 RX ADMIN — FENTANYL CITRATE 50 MCG: 50 INJECTION, SOLUTION INTRAMUSCULAR; INTRAVENOUS at 10:32

## 2020-08-24 RX ADMIN — FENTANYL CITRATE 100 MCG: 50 INJECTION, SOLUTION INTRAMUSCULAR; INTRAVENOUS at 10:14

## 2020-08-24 RX ADMIN — VANCOMYCIN HYDROCHLORIDE 1500 MG: 1.5 INJECTION, POWDER, LYOPHILIZED, FOR SOLUTION INTRAVENOUS at 21:29

## 2020-08-24 RX ADMIN — TRANEXAMIC ACID 1000 MG: 100 INJECTION, SOLUTION INTRAVENOUS at 10:22

## 2020-08-24 RX ADMIN — ROCURONIUM BROMIDE 10 MG: 10 INJECTION INTRAVENOUS at 10:30

## 2020-08-24 RX ADMIN — SENNOSIDES AND DOCUSATE SODIUM 1 TABLET: 8.6; 5 TABLET ORAL at 19:52

## 2020-08-24 RX ADMIN — PROPOFOL 150 MG: 10 INJECTION, EMULSION INTRAVENOUS at 10:14

## 2020-08-24 RX ADMIN — OXYCODONE HYDROCHLORIDE 10 MG: 10 TABLET ORAL at 14:29

## 2020-08-24 RX ADMIN — ROCURONIUM BROMIDE 10 MG: 10 INJECTION INTRAVENOUS at 11:00

## 2020-08-24 RX ADMIN — HYDROMORPHONE HYDROCHLORIDE 0.5 MG: 1 INJECTION, SOLUTION INTRAMUSCULAR; INTRAVENOUS; SUBCUTANEOUS at 20:43

## 2020-08-24 RX ADMIN — HYDROMORPHONE HYDROCHLORIDE 0.5 MG: 1 INJECTION, SOLUTION INTRAMUSCULAR; INTRAVENOUS; SUBCUTANEOUS at 13:00

## 2020-08-24 RX ADMIN — NORTRIPTYLINE HYDROCHLORIDE 50 MG: 50 CAPSULE ORAL at 19:53

## 2020-08-24 RX ADMIN — ROCURONIUM BROMIDE 50 MG: 10 INJECTION INTRAVENOUS at 10:14

## 2020-08-24 RX ADMIN — SODIUM CHLORIDE: 9 INJECTION, SOLUTION INTRAVENOUS at 07:54

## 2020-08-24 RX ADMIN — BUPIVACAINE 10 ML: 13.3 INJECTION, SUSPENSION, LIPOSOMAL INFILTRATION at 12:30

## 2020-08-24 RX ADMIN — DEXAMETHASONE SODIUM PHOSPHATE 10 MG: 10 INJECTION INTRAMUSCULAR; INTRAVENOUS at 08:02

## 2020-08-24 RX ADMIN — SODIUM CHLORIDE, POTASSIUM CHLORIDE, SODIUM LACTATE AND CALCIUM CHLORIDE: 600; 310; 30; 20 INJECTION, SOLUTION INTRAVENOUS at 17:15

## 2020-08-24 RX ADMIN — TRANEXAMIC ACID 1000 MG: 100 INJECTION, SOLUTION INTRAVENOUS at 11:27

## 2020-08-24 RX ADMIN — MIDAZOLAM 2 MG: 1 INJECTION INTRAMUSCULAR; INTRAVENOUS at 10:06

## 2020-08-24 RX ADMIN — HYDROMORPHONE HYDROCHLORIDE 0.5 MG: 2 INJECTION, SOLUTION INTRAMUSCULAR; INTRAVENOUS; SUBCUTANEOUS at 12:05

## 2020-08-24 RX ADMIN — BUPIVACAINE HYDROCHLORIDE 10 ML: 5 INJECTION, SOLUTION EPIDURAL; INTRACAUDAL at 12:30

## 2020-08-24 RX ADMIN — HYDROMORPHONE HYDROCHLORIDE 0.5 MG: 2 INJECTION, SOLUTION INTRAMUSCULAR; INTRAVENOUS; SUBCUTANEOUS at 12:00

## 2020-08-24 RX ADMIN — SODIUM CHLORIDE: 9 INJECTION, SOLUTION INTRAVENOUS at 11:26

## 2020-08-24 RX ADMIN — GABAPENTIN 900 MG: 600 TABLET, FILM COATED ORAL at 08:01

## 2020-08-24 RX ADMIN — HYDROMORPHONE HYDROCHLORIDE 0.5 MG: 1 INJECTION, SOLUTION INTRAMUSCULAR; INTRAVENOUS; SUBCUTANEOUS at 13:30

## 2020-08-24 RX ADMIN — ONDANSETRON 4 MG: 2 INJECTION INTRAMUSCULAR; INTRAVENOUS at 11:35

## 2020-08-24 RX ADMIN — OXYCODONE HYDROCHLORIDE 30 MG: 15 TABLET, FILM COATED, EXTENDED RELEASE ORAL at 19:52

## 2020-08-24 RX ADMIN — ANTIHEMOPHILIC FACTOR/VON WILLEBRAND FACTOR COMPLEX (HUMAN) 3800 UNITS: KIT at 08:11

## 2020-08-24 RX ADMIN — SENNOSIDES AND DOCUSATE SODIUM 1 TABLET: 8.6; 5 TABLET ORAL at 14:30

## 2020-08-24 RX ADMIN — ANTIHEMOPHILIC FACTOR/VON WILLEBRAND FACTOR COMPLEX (HUMAN) 3800 UNITS: KIT at 12:36

## 2020-08-24 RX ADMIN — DEXAMETHASONE SODIUM PHOSPHATE 10 MG: 4 INJECTION, SOLUTION INTRA-ARTICULAR; INTRALESIONAL; INTRAMUSCULAR; INTRAVENOUS; SOFT TISSUE at 10:25

## 2020-08-24 RX ADMIN — ROCURONIUM BROMIDE 10 MG: 10 INJECTION INTRAVENOUS at 10:45

## 2020-08-24 RX ADMIN — VANCOMYCIN HYDROCHLORIDE 1500 MG: 1.5 INJECTION, POWDER, LYOPHILIZED, FOR SOLUTION INTRAVENOUS at 09:09

## 2020-08-24 RX ADMIN — METOCLOPRAMIDE 10 MG: 5 INJECTION, SOLUTION INTRAMUSCULAR; INTRAVENOUS at 10:25

## 2020-08-24 RX ADMIN — HYDROMORPHONE HYDROCHLORIDE 0.5 MG: 2 INJECTION, SOLUTION INTRAMUSCULAR; INTRAVENOUS; SUBCUTANEOUS at 11:52

## 2020-08-24 RX ADMIN — PREGABALIN 150 MG: 150 CAPSULE ORAL at 19:52

## 2020-08-24 ASSESSMENT — PAIN DESCRIPTION - PAIN TYPE
TYPE: SURGICAL PAIN

## 2020-08-24 ASSESSMENT — PULMONARY FUNCTION TESTS
PIF_VALUE: 28
PIF_VALUE: 27
PIF_VALUE: 1
PIF_VALUE: 17
PIF_VALUE: 24
PIF_VALUE: 26
PIF_VALUE: 28
PIF_VALUE: 1
PIF_VALUE: 26
PIF_VALUE: 20
PIF_VALUE: 24
PIF_VALUE: 25
PIF_VALUE: 22
PIF_VALUE: 24
PIF_VALUE: 28
PIF_VALUE: 27
PIF_VALUE: 12
PIF_VALUE: 17
PIF_VALUE: 1
PIF_VALUE: 29
PIF_VALUE: 29
PIF_VALUE: 26
PIF_VALUE: 27
PIF_VALUE: 27
PIF_VALUE: 26
PIF_VALUE: 26
PIF_VALUE: 17
PIF_VALUE: 1
PIF_VALUE: 17
PIF_VALUE: 29
PIF_VALUE: 21
PIF_VALUE: 28
PIF_VALUE: 20
PIF_VALUE: 28
PIF_VALUE: 1
PIF_VALUE: 27
PIF_VALUE: 20
PIF_VALUE: 27
PIF_VALUE: 26
PIF_VALUE: 4
PIF_VALUE: 17
PIF_VALUE: 29
PIF_VALUE: 26
PIF_VALUE: 27
PIF_VALUE: 24
PIF_VALUE: 17
PIF_VALUE: 26
PIF_VALUE: 26
PIF_VALUE: 28
PIF_VALUE: 17
PIF_VALUE: 6
PIF_VALUE: 27
PIF_VALUE: 26
PIF_VALUE: 28
PIF_VALUE: 24
PIF_VALUE: 28
PIF_VALUE: 17
PIF_VALUE: 26
PIF_VALUE: 20
PIF_VALUE: 26
PIF_VALUE: 20
PIF_VALUE: 29
PIF_VALUE: 26
PIF_VALUE: 28
PIF_VALUE: 30
PIF_VALUE: 27
PIF_VALUE: 20
PIF_VALUE: 12
PIF_VALUE: 25
PIF_VALUE: 27
PIF_VALUE: 28
PIF_VALUE: 29
PIF_VALUE: 26
PIF_VALUE: 20
PIF_VALUE: 2
PIF_VALUE: 20
PIF_VALUE: 28
PIF_VALUE: 26
PIF_VALUE: 26
PIF_VALUE: 9
PIF_VALUE: 17
PIF_VALUE: 5
PIF_VALUE: 20
PIF_VALUE: 26
PIF_VALUE: 29
PIF_VALUE: 0
PIF_VALUE: 26
PIF_VALUE: 28
PIF_VALUE: 28
PIF_VALUE: 25
PIF_VALUE: 5
PIF_VALUE: 27
PIF_VALUE: 28
PIF_VALUE: 0
PIF_VALUE: 28
PIF_VALUE: 17
PIF_VALUE: 28
PIF_VALUE: 26
PIF_VALUE: 28
PIF_VALUE: 17
PIF_VALUE: 24
PIF_VALUE: 3
PIF_VALUE: 26
PIF_VALUE: 20
PIF_VALUE: 26
PIF_VALUE: 5
PIF_VALUE: 29

## 2020-08-24 ASSESSMENT — PAIN SCALES - GENERAL
PAINLEVEL_OUTOF10: 9
PAINLEVEL_OUTOF10: 7
PAINLEVEL_OUTOF10: 9
PAINLEVEL_OUTOF10: 8
PAINLEVEL_OUTOF10: 9
PAINLEVEL_OUTOF10: 6
PAINLEVEL_OUTOF10: 0

## 2020-08-24 ASSESSMENT — PAIN DESCRIPTION - DESCRIPTORS
DESCRIPTORS: THROBBING
DESCRIPTORS: ACHING;CONSTANT;PRESSURE
DESCRIPTORS: STABBING
DESCRIPTORS: ACHING
DESCRIPTORS: STABBING
DESCRIPTORS: ACHING;CONSTANT

## 2020-08-24 ASSESSMENT — PAIN DESCRIPTION - ORIENTATION
ORIENTATION: RIGHT

## 2020-08-24 ASSESSMENT — PAIN DESCRIPTION - PROGRESSION
CLINICAL_PROGRESSION: NOT CHANGED
CLINICAL_PROGRESSION: NOT CHANGED
CLINICAL_PROGRESSION: GRADUALLY IMPROVING
CLINICAL_PROGRESSION: NOT CHANGED

## 2020-08-24 ASSESSMENT — PAIN DESCRIPTION - FREQUENCY
FREQUENCY: CONTINUOUS

## 2020-08-24 ASSESSMENT — PAIN DESCRIPTION - LOCATION
LOCATION: KNEE

## 2020-08-24 ASSESSMENT — PAIN DESCRIPTION - ONSET
ONSET: AWAKENED FROM SLEEP
ONSET: ON-GOING
ONSET: AWAKENED FROM SLEEP

## 2020-08-24 NOTE — H&P
HISTORY and Treinta ORTIZ [id number]       NAME:  Shanique Anaya  MRN: [id number]   YOB: 1965   Date: 8/24/2020   Age: 49 y.o. Gender: female       COMPLAINT AND PRESENT HISTORY:     Magdalena Almanza is 49 y.o.,  female, here for [address] Right pt has history of DJD RIGHT KNEE   pt has history of moderated to severe DJD of the knees bilaterally, pt reports her pain and Limited ROM of her knee prevents her from participation in her daily activities. Patient C/O of pain swelling, stiffness, popping and cracking in the right  Knee. Pt describes the pain as 8/10 in intensity at times. Symptoms started couple years ago. The knee does  buckle, give way under the patient. locking up. No recent falls or trauma, No Hx of MRSA infections in the past.  Pt reports she had Cortizone injections a couple months ago that only lasted a couple weeks. Tenderness on palpation of the Rt  Knee joint space painful on medial and lateral aspect. Pt denies chest  pain , no SOB, no fever/chills  Pt NPO since past midnight, pt took her BP+ PPI  medication at morning with sip of water. Pt reports she has bleeding disorder.  Pt reports history of  nausea and vomiting postoperative before  Rt knee Arthroscopy      PAST MEDICAL HISTORY     Past Medical History:   Diagnosis Date    Acid indigestion     hx of    Anesthesia      difficult intubation due to cranio cervical fusion (intubation by fiber optic of glidescope (Dr. Dickey Sensing [phone] orthopedic spinal surgion.)     Cancer [facility]) 1987    cervical    Chiari I malformation ([facility]     Chronic back pain     Difficult intubation     hx. of cranio-cervical fusion , intubation by fiber optic glidescopy    Elevated liver enzymes 09/2016    Fibromyalgia     GERD (gastroesophageal reflux disease)     Headache     Hepatic steatosis     [place] (hard of hearing)     noel. ears    Kidney stones     Liver disease     Obesity     Osteoarthritis     PONV (postoperative nausea and vomiting)     Seizures (HCC)     non epileptic, last     Thyroid disease     hypothyroid    Urinary incontinence     UTI (urinary tract infection)     Von Willebrand disease ([address]     Wears glasses        SURGICAL HISTORY       Past Surgical History:   Procedure Laterality Date    APPENDECTOMY      BACK SURGERY      lumbar surgery    BRAIN SURGERY      CARPAL TUNNEL RELEASE Right     CHOLECYSTECTOMY      COLONOSCOPY      DILATION AND CURETTAGE OF UTERUS      few times    HYSTERECTOMY      KNEE ARTHROSCOPY Bilateral     PA KNEE SCOPE,DIAGNOSTIC Bilateral 11/2/2017    KNEE ARTHROSCOPY RIGHT WITH PARTIAL MEDIAL MENISECTOMY, SUBTOTAL LATERAL MENISECTOMY, AND DEBRIDEMENT OF ACL /  NEEDLE ASPIRATION LEFT KNEE performed by Oli Francis MD at [address] [place] [address] Right        FAMILY HISTORY       Family History   Problem Relation Age of Onset    Kidney Disease Mother     Breast Cancer Mother         breast    Kidney Disease Sister     Breast Cancer Sister         breast    Kidney Disease Brother     Breast Cancer Maternal Grandmother         breast    Breast Cancer Paternal Grandmother         breast       SOCIAL HISTORY       Social History     Socioeconomic History    Marital status:      Spouse name: None    Number of children: None    Years of education: None    Highest education level: None   Occupational History    None   Social Needs    Financial resource strain: None    Food insecurity     Worry: None     Inability: None    Transportation needs     Medical: None     Non-medical: None   Tobacco Use    Smoking status: Current Every Day Smoker     Packs/day: 0.25     Years: 20.00     Pack years: 5.00    Smokeless tobacco: Never Used   Substance and Sexual Activity    Alcohol use: No     Alcohol/week: 0.0 standard drinks    Drug use: No    Sexual activity: None   Lifestyle    Physical activity     Days per week: None     Minutes per session: None    Stress: None   Relationships    Social connections     Talks on phone: None     Gets together: None     Attends Taoism service: None     Active member of club or organization: None     Attends meetings of clubs or organizations: None     Relationship status: None    Intimate partner violence     Fear of current or ex partner: None     Emotionally abused: None     Physically abused: None     Forced sexual activity: None   Other Topics Concern    None   Social History Narrative    None           REVIEW OF SYSTEMS      Allergies   Allergen Reactions    Adhesive Tape     Meperidine Itching    Morphine Other (See Comments)    Nubain  [Nalbuphine Hcl] Other (See Comments)     Nubain    Propoxyphene     Tramadol        No current facility-administered medications on file prior to encounter.       Current Outpatient Medications on File Prior to Encounter   Medication Sig Dispense Refill    ibuprofen (ADVIL;MOTRIN) 800 MG tablet Take 1 tablet by mouth every 8 hours as needed for Pain 90 tablet 11    spironolactone (ALDACTONE) 50 MG tablet TAKE 1 TABLET BY MOUTH EVERY DAY 90 tablet 3    atorvastatin (LIPITOR) 20 MG tablet TAKE 1 TABLET BY MOUTH EVERY DAY 30 tablet 11    omeprazole (PRILOSEC) 40 MG delayed release capsule TAKE 1 CAPSULE BY MOUTH EVERY DAY 90 capsule 3    Omega-3 Fatty Acids ([employer] FISH OIL) 1000 MG CAPS TAKE 2 TABLETS BY MOUTH EVERY DAY      liothyronine (CYTOMEL) 5 MCG tablet TAKE 1 TABLET BY MOUTH TWICE A DAY      KLOR-CON M20 20 MEQ extended release tablet TAKE 2 TABLETS BY MOUTH EVERY  tablet 3    tiZANidine (ZANAFLEX) 4 MG tablet TAKE 1 TABLET BY MOUTH AT BEDTIME  5    metFORMIN, OSM, (FORTAMET) 1000 MG extended release tablet TAKE 1 TABLET BY MOUTH EVERY DAY  1    VICTOZA 18 MG/3ML SOPN SC injection INJECT 1.2 MG UNDER THE SKIN ONCE DAILY  1    levothyroxine (SYNTHROID) 50 MCG tablet Take 50 mcg by mouth Daily      hydrocortisone 1 % cream Apply topically TID for up to 2 weeks 60 g 1    hydrochlorothiazide (HYDRODIURIL) 25 MG tablet TAKE 1 TABLET BY MOUTH EVERY DAY 90 tablet 3    predniSONE (DELTASONE) 5 MG tablet TAKE 1/2 TABLET BY MOUTH DAILY 30 tablet 11    nortriptyline (PAMELOR) 50 MG capsule TAKE 3 CAPSULES BY MOUTH AT BEDTIME 270 capsule 3    lidocaine viscous hcl (XYLOCAINE) 2 % SOLN solution Apply with a swab to mouth lesions every hour as needed for pain 100 mL 2    oxyCODONE (OXYCONTIN) 30 MG T12A extended release tablet Take 1 tablet by mouth every 12 hours.  triamcinolone (KENALOG) 0.025 % cream Apply topically 2 times daily. 80 g 0    oxyCODONE (OXYCONTIN) 10 MG extended release tablet Take 10 mg by mouth as needed for Pain.  Cholecalciferol 2000 UNITS TABS Take by mouth      Multiple Vitamins-Minerals (THERAPEUTIC MULTIVITAMIN-MINERALS) tablet Take 1 tablet by mouth daily      pregabalin (LYRICA) 150 MG capsule Take 1 capsule by mouth daily (Patient taking differently: Take 150 mg by mouth nightly. ) 60 capsule 2    Biotin 5 MG CAPS Take 1 capsule by mouth daily      Handicap Placard MISC by Does not apply route 5 years, cannot walk over 200 ft. 1 each 0    Handicap Placard MISC by Does not apply route 5 years, cannot walk over 200 ft. 1 each 0    valACYclovir (VALTREX) 500 MG tablet TAKE 1 TABLET BY MOUTH DAILY AS NEEDED (LESIONS) 90 tablet 3    Insulin Pen Needle (B-D ULTRAFINE III SHORT PEN) 31G X 8 MM MISC Inject 1 each into the skin daily May substitute with any brand 100 each 11    vitamin B-12 (CYANOCOBALAMIN) 500 MCG tablet Take 500 mcg by mouth daily         Negative except for what is mentioned in the HPI. GENERAL PHYSICAL EXAM     Vitals: /67   Pulse 85   Temp 97.7 °F (36.5 °C) (Infrared)   Resp 16   Ht 5' 3\" (1.6 m)   Wt 198 lb (89.8 kg)   SpO2 97%   BMI 35.07 kg/m²  Body mass index is 35.07 kg/m².      GENERAL APPEARANCE:   Magdalena Almanza is 49 y.o.,  female, mildly

## 2020-08-24 NOTE — PROGRESS NOTES
Cognition  Overall Cognitive Status: Impaired  Attention Span: Attends with cues to redirect  Memory: Decreased short term memory  Following Commands: Follows multistep commands with repetition  Safety Judgement: Decreased awareness of need for safety  Awareness of Errors: Assistance required to correct errors made  Insights: Decreased awareness of deficits  Sequencing and Organization: Assistance required to generate solutions, Assistance required to identify errors made   Perception  Overall Perceptual Status: [facility]  Sensation  Overall Sensation Status: Impaired(reports numbness in RLE)   ADL  Feeding: Setup  Grooming: Supervision, Setup  UE Bathing: Stand by assistance, Setup  LE Bathing: Minimal assistance, Setup  UE Dressing: Stand by assistance, Setup  LE Dressing: Moderate assistance, Setup(SBA thread BLE underwear; Assist to don B socks)  Toileting: Contact guard assistance(after urinating)  Additional Comments: ADL scores based on skilled observations and clinical reasoning unless otherwise noted. Pt engaged in lower body dressing tasks and toileting tasks this date. Please see above for details. OT provided education regarding threading RLE into underwear first with Pt demonstrating Fair return.     UE Function  LUE Strength  Gross LUE Strength: WFL  L Hand General: 4/5  LUE Strength Comment: Shoulder 4/5, elbow 4/5     LUE Tone: Normotonic     LUE AROM (degrees)  LUE AROM : WFL     Left Hand AROM (degrees)  Left Hand AROM: WFL  RUE Strength  Gross RUE Strength: WFL  R Hand General: 4/5  RUE Strength Comment: Shoulder 4/5, elbow 4/5      RUE Tone: Normotonic     RUE AROM (degrees)  RUE AROM : WFL     Right Hand AROM (degrees)  Right Hand AROM: WFL    Fine Motor Skills  Coordination  Movements Are Fluid And Coordinated: Yes     Mobility  Supine to Sit: Stand by assistance       Balance  Sitting Balance: Stand by assistance  Standing Balance: Contact guard assistance     Functional Mobility  Functional - Mobility Device: Rolling Walker  Activity: To/from bathroom  Assist Level: Contact guard assistance  Functional Mobility Comments: no loss of balance noted  Bed mobility  Rolling to Right: Stand by assistance  Supine to Sit: Stand by assistance  Scooting: Stand by assistance  Comment: head of bed elevated as pt sleeps in adjustable bed at home     Toilet Transfers  Toilet - Technique: Ambulating  Equipment Used: Grab bars  Toilet Transfer: Contact guard assistance  Toilet Transfers Comments: with rolling walker  Functional Activity Tolerance  Functional Activity Tolerance: Tolerates 30 min exercise with multiple rests  Additional Comments: oxygen saturation 90-93% on room air throughout session; Pt returned to 2L of O2 at end of session while seated in chair   Assessment  Performance deficits / Impairments: Decreased functional mobility , Decreased ADL status, Decreased strength, Decreased safe awareness, Decreased cognition, Decreased endurance, Decreased balance, Decreased sensation, Decreased high-level IADLs  Treatment Diagnosis: Impaired self-care status. Prognosis: Good  Decision Making: Medium Complexity  REQUIRES OT FOLLOW UP: Yes  Discharge Recommendations: Patient would benefit from continued therapy after discharge  Activity Tolerance: Patient Tolerated treatment well    Functional Outcome Measures  AM-PAC Daily Activity Inpatient   How much help for putting on and taking off regular lower body clothing?: A Lot  How much help for Bathing?: A Lot  How much help for Toileting?: A Little  How much help for putting on and taking off regular upper body clothing?: A Little  How much help for taking care of personal grooming?: A Little  How much help for eating meals?: A Little  AM-[facility] Inpatient Daily Activity Raw Score: 16  AM-PAC Inpatient ADL T-Scale Score : 35.96  ADL Inpatient CMS 0-100% Score: 53.32  ADL Inpatient CMS G-Code Modifier : CK    Goals  Patient Goals   Patient goals :  To return home with family support  Short term goals  Time Frame for Short term goals: By discharge  Short term goal 1: Pt will verbalize/demonstrate Good understanding of assistive equipment/durable medical equipment/modified techniques for increased independence with self-care and mobility. Short term goal 2: Pt will verbalize/demonstrate Good understanding of Total Joint Program - R knee. Short term goal 3: Pt will actively participate in Total Joint Program - R knee. Short term goal 4: Pt will actively participate in 30+ minutes of therapeutic exercise/functional activities to promote increased independence with self-care and mobility.     Plan  Safety Devices  Safety Devices in place: Yes  Type of devices: Call light within reach, Gait belt, Patient at risk for falls, Left in chair, Nurse notified     Plan  Times per week: 4-6 (BID)  Times per day: Twice a day  Current Treatment Recommendations: Self-Care / ADL, Home Management Training, Strengthening, Balance Training, Functional Mobility Training, Endurance Training, Pain Management, Safety Education & Training, Patient/Caregiver Education & Training, Equipment Evaluation, Education, & procurement     OT Individual Minutes  Time In: 0553  Time Out: 8384  Minutes: 34    Electronically signed by Adalgisa Bates OT on 8/24/20 at 3:37 PM EDT

## 2020-08-24 NOTE — FLOWSHEET NOTE
Patient admitted from PACU to room 2041 per bed.  at bedside. Assessment and orientation to the room and call  System completed.

## 2020-08-24 NOTE — FLOWSHEET NOTE
Writer spoke with Reese Torres at Dr. Dimitry Meyer office. Message left to inform Dr. Kristine Tariq of patients admission and need for home medications.

## 2020-08-24 NOTE — ANESTHESIA POSTPROCEDURE EVALUATION
Department of Anesthesiology  Postprocedure Note    Patient: Nickolas Mason  MRN: [id number]  YOB: 1965  Date of evaluation: 8/24/2020  Time:  1:56 PM     Procedure Summary     Date:  08/24/20 Room / Location:  [address] [place] [address] / [facility]: YONY BOSCH    Anesthesia Start:  1006 Anesthesia Stop:  0721    Procedure:  KNEE TOTAL ARTHROPLASTY (Right Knee) Diagnosis:  (DJD RIGHT KNEE          UPDATE ON ADMIT PER ANES)    Surgeon:  Nupur Borges MD Responsible Provider:  Marco Antonio Claudio MD    Anesthesia Type:  general, regional ASA Status:  3          Anesthesia Type: general, regional    Kane Phase I: Kane Score: 9    Kane Phase II:      Last vitals: Reviewed and per EMR flowsheets.        Anesthesia Post Evaluation    Comments: POST- ANESTHESIA EVALUATION       Pt Name: Nickolas Mason  MRN: [id number]  YOB: 1965  Date of evaluation: 8/24/2020  Time:  1:57 PM      BP (!) 126/49   Pulse 101   Temp 97.9 °F (36.6 °C) (Oral)   Resp 18   Ht 5' 2\" (1.575 m)   Wt 216 lb 4.3 oz (98.1 kg)   SpO2 94%   BMI 39.56 kg/m²      Consciousness Level  Awake  Cardiopulmonary Status  Stable  Pain Adequately Treated YES  Nausea / Vomiting  NO  Adequate Hydration  YES  Anesthesia Related Complications NONE      Electronically signed by Kal Pitt MD on 8/24/2020 at 1:57 PM

## 2020-08-24 NOTE — CARE COORDINATION
Joint Replacement Discharge Planning Note:    Admission Date:  8/24/2020 Abraham Dubon is a 49 y.o.  female    Admitted for : Primary osteoarthritis of right knee [M17.11]    Met with:  Patient    PCP:  Joaquim Hu MD              Insurance:  [employer] medicare    Current Residence/ Living Arrangements:  independently at home             Current Services PTA:  No    Is patient agreeable to [employer]: Yes    Is patient agreeable to outpatient physical therapy:  Yes    Freedom of choice provided: Yes         [address] [employer] Agency/Outpatient Therapy chosen:  [facility] home care    Potential Venu Saini needed: No    Current home DME:  [facility] [address]  [facility]    Does Patient want to use MEDS to BEDS?([facility] only) Yes    Transportation Provider:  Family                       Discharge Plan:   Patient intends to discharge to Home    Patient needs a wheeled walker.      Anticipated discharge date 8/25/20      Readmission Risk              Risk of Unplanned Readmission:        0           Electronically signed by: Sadaf Banegas RN on 8/24/2020 at 4:50 PM

## 2020-08-24 NOTE — DISCHARGE INSTR - COC
Continuity of Care Form    Patient Name: James Shearer   :  1965  MRN:  [id number]    Admit date:  2020  Discharge date:  20    Code Status Order: Full Code   Advance Directives:   [facility] Documentation     Date/Time Healthcare Directive Type of Healthcare Directive Copy in [address] Agent's Name Healthcare Agent's Phone Number    [phone]  Yes, patient has an advance directive for healthcare treatment --  Yes, copy in chart -- -- --          Admitting Physician:  Ivett Whitney MD  PCP: Mary Caballero MD    Discharging Nurse: [facility] Unit/Room#: [facility]  Discharging Unit Phone Number: [phone]    Emergency Contact:   Extended Emergency Contact Information  Primary Emergency Contact: Bhavin Machado  Address: [address] [place] [address], [address] [place] [address] Phone: [phone]  Relation: Spouse  Secondary Emergency Contact: Kirti Palacio  [place] Phone: [phone]  Relation: Brother/Sister    Past Surgical History:  Past Surgical History:   Procedure Laterality Date    APPENDECTOMY      BACK SURGERY      lumbar surgery    BRAIN SURGERY      CARPAL TUNNEL RELEASE Right     CHOLECYSTECTOMY      COLONOSCOPY      DILATION AND CURETTAGE OF UTERUS      few times    HYSTERECTOMY      KNEE ARTHROSCOPY Bilateral     MI KNEE SCOPE,DIAGNOSTIC Bilateral 2017    KNEE ARTHROSCOPY RIGHT WITH PARTIAL MEDIAL MENISECTOMY, SUBTOTAL LATERAL MENISECTOMY, AND DEBRIDEMENT OF ACL /  NEEDLE ASPIRATION LEFT KNEE performed by Ivett Whitney MD at [address] Right        Immunization History:   Immunization History   Administered Date(s) Administered    Influenza Virus Vaccine 10/16/2015    Influenza, Bhargavi Khris, IM, (6 mo and older Fluzone, Flulaval, Fluarix and 3 yrs and older Afluria) 2016, 2017, 2018    Influenza, Quadv, IM, PF (6 mo and older Fluzone, Flulaval, Fluarix, and 3 yrs and older Afluria) 11/01/2019    Pneumococcal Polysaccharide (Atrmdilgb01) 05/19/2017, 02/16/2018    Td (Adult), 2 Lf Tetanus Toxoid, Pf (Td, Absorbed) 01/31/2013    Tdap (Boostrix, Adacel) 01/01/2012, 02/16/2015       Active Problems:  Patient Active Problem List   Diagnosis Code    Von Willebrand's disease ([facility] D68.0    Hypothyroid E03.9    Arnold-Chiari syndrome without spina bifida or hydrocephalus ([facility] Q07.00    Attention deficit disorder (ADD) without hyperactivity F98.8    Hepatocellular damage K76.9    Hypokalemia E87.6    Elevated liver enzymes R74.8    Chiari I malformation (HCC) G93.5    Osteoarthritis M19.90    Liver disease K76.9    Chronic back pain M54.9, G89.29    Fibromyalgia M79.7    Headache R51    Urinary incontinence R32    Obesity E66.9    Hepatic steatosis K76.0    Gastroesophageal reflux disease without esophagitis K21.9    Hyperglycemia R73.9    Acute gout of knee M10.9    Von Willebrand disease ([facility] D68.0    Polymyalgia rheumatica (HCC) M35.3    Vitamin D deficiency E55.9    BMI 38.0-38.9,adult Z68.38    Primary osteoarthritis of right knee M17.11       Isolation/Infection:   Isolation          No Isolation        Patient Infection Status     Infection Onset Added Last Indicated Last Indicated By Review Planned Expiration Resolved Resolved By    MRSA 06/17/20 06/17/20 06/17/20 MRSA DNA Probe, Nasal        Resolved    COVID-19 Rule Out 08/20/20 08/21/20 08/20/20 Covid-19 Ambulatory (Ordered)   08/22/20 Rule-Out Test Resulted    COVID-19 Rule Out 06/26/20 06/26/20 06/26/20 COVID-19 (Ordered)   06/28/20 Rule-Out Test Resulted          Nurse Assessment:  Last Vital Signs: /67   Pulse 85   Temp 97.7 °F (36.5 °C) (Infrared)   Resp 16   Ht 5' 3\" (1.6 m)   Wt 198 lb (89.8 kg)   SpO2 97%   BMI 35.07 kg/m²     Last documented pain score (0-10 scale): Pain Level: 8  Last Weight:   Wt Readings from Last 1 Encounters:   08/24/20 198 lb (89.8 kg)     Mental Status:  oriented and alert    IV Access:  - None    Nursing Mobility/ADLs:  Walking   Independent  Transfer  Independent  Bathing  Independent  Dressing  Independent  Toileting  Independent  Feeding  Independent  Med [address] [place]  Med Delivery   whole    Wound Care Documentation and Therapy:        Elimination:  Continence:   · Bowel: Yes  · Bladder: Yes  Urinary Catheter: None   Colostomy/Ileostomy/Ileal Conduit: No       Date of Last BM: 10/26  No intake or output data in the 24 hours ending 08/24/20 0735  No intake/output data recorded. Safety Concerns: At Risk for Falls    Impairments/Disabilities:      None    Nutrition Therapy:  Current Nutrition Therapy:   - Oral Diet:  General    Routes of Feeding: Oral  Liquids: Thin Liquids  Daily Fluid Restriction: no  Last Modified Barium Swallow with Video (Video Swallowing Test): not done    Treatments at the Time of Hospital Discharge:   Respiratory Treatments: na   Oxygen Therapy:  is not on home oxygen therapy.   Ventilator:    - No ventilator support    Rehab Therapies: Physical Therapy and Occupational Therapy  Weight Bearing Status/Restrictions: No weight bearing restirctions  Other Medical Equipment (for information only, NOT a DME order):  cane and walker  Other Treatments:     Patient's personal belongings (please select all that are sent with patient):  Glasses    RN SIGNATURE:  Electronically signed by Shirley Silver RN on 8/26/20 at 1:26 PM EDT    CASE MANAGEMENT/SOCIAL WORK SECTION    Inpatient Status Date: OBS    Readmission Risk Assessment Score:  Readmission Risk              Risk of Unplanned Readmission:        0           Discharging to Facility/ Agency   [facility]  [address] [facility] [address] [place] [address]  Phone [phone]  Fax  [phone]      / signature: Electronically signed by Miguelangel Mejia RN on 8/24/20 at 5:25 PM EDT    PHYSICIAN SECTION    Prognosis: Good    Condition at Discharge: Stable    Rehab Potential (if transferring to Rehab): Good    Recommended Labs or Other Treatments After Discharge:     Physician Certification: I certify the above information and transfer of Lavonna Pallas  is necessary for the continuing treatment of the diagnosis listed and that she requires [address] for greater 30 days.      Update Admission H&P: No change in H&P    PHYSICIAN SIGNATURE:  Electronically signed by Bobby De La Fuente MD on 8/24/20 at 7:35 AM EDT

## 2020-08-24 NOTE — ANESTHESIA PRE PROCEDURE
Department of Anesthesiology  Preprocedure Note       Name:  Gisselle Ingram   Age:  49 y.o.  :  1965                                          MRN:  [id number]         Date:  2020      Surgeon: Doyle Morales):  Cristal Castillo MD    Procedure: Procedure(s):  KNEE TOTAL ARTHROPLASTY    Medications prior to admission:   Prior to Admission medications    Medication Sig Start Date End Date Taking?  Authorizing Provider   Handicap Placard MISC by Does not apply route 5 years, cannot walk over 200 ft. 20   Wil Burgess MD   Handicap Placard MISC by Does not apply route 5 years, cannot walk over 200 ft. 20   Wil Burgess MD   ibuprofen (ADVIL;MOTRIN) 800 MG tablet Take 1 tablet by mouth every 8 hours as needed for Pain 20   Wil Burgess MD   spironolactone (ALDACTONE) 50 MG tablet TAKE 1 TABLET BY MOUTH EVERY DAY 20   Wil Burgess MD   atorvastatin (LIPITOR) 20 MG tablet TAKE 1 TABLET BY MOUTH EVERY DAY 6/10/20   Wil Burgess MD   omeprazole (PRILOSEC) 40 MG delayed release capsule TAKE 1 CAPSULE BY MOUTH EVERY DAY 20   Wil uBrgess MD   Omega-3 Fatty Acids ([employer] FISH OIL) 1000 MG CAPS TAKE 2 TABLETS BY MOUTH EVERY DAY 20   Historical Provider, MD   liothyronine (CYTOMEL) 5 MCG tablet TAKE 1 TABLET BY MOUTH TWICE A DAY 4/3/20   Historical Provider, MD   KLOR-CON M20 20 MEQ extended release tablet TAKE 2 TABLETS BY MOUTH EVERY DAY 1/10/20   Wil Burgess MD   valACYclovir (VALTREX) 500 MG tablet TAKE 1 TABLET BY MOUTH DAILY AS NEEDED (LESIONS) 20   Wil Burgess MD   tiZANidine (ZANAFLEX) 4 MG tablet TAKE 1 TABLET BY MOUTH AT BEDTIME 10/14/19   Historical Provider, MD   metFORMIN, OSM, (FORTAMET) 1000 MG extended release tablet TAKE 1 TABLET BY MOUTH EVERY DAY 9/3/19   Historical Provider, MD   VICTOZA 18 MG/3ML SOPN SC injection INJECT 1.2 MG UNDER THE SKIN ONCE DAILY 19   Historical Provider, MD   levothyroxine (SYNTHROID) 50 MCG tablet Take 50 mcg by mouth Daily    Historical Provider, MD   hydrocortisone 1 % cream Apply topically TID for up to 2 weeks 10/7/19   BRIAN Romero CNP   hydrochlorothiazide (HYDRODIURIL) 25 MG tablet TAKE 1 TABLET BY MOUTH EVERY DAY 9/19/19   Lorena Boyer MD   predniSONE (DELTASONE) 5 MG tablet TAKE 1/2 TABLET BY MOUTH DAILY 9/13/19   Lorena Boyer MD   nortriptyline (PAMELOR) 50 MG capsule TAKE 3 CAPSULES BY MOUTH AT BEDTIME 8/15/19   Lorena Boyer MD   lidocaine viscous hcl (XYLOCAINE) 2 % SOLN solution Apply with a swab to mouth lesions every hour as needed for pain 7/3/19   Lorena Boyer MD   Insulin Pen Needle (B-D ULTRAFINE III SHORT PEN) 31G X 8 MM MISC Inject 1 each into the skin daily May substitute with any brand 6/22/19   Lorena Boyer MD   oxyCODONE (OXYCONTIN) 30 MG T12A extended release tablet Take 1 tablet by mouth every 12 hours. Historical Provider, MD   triamcinolone (KENALOG) 0.025 % cream Apply topically 2 times daily. 6/29/18   Lorena Boyer MD   oxyCODONE (OXYCONTIN) 10 MG extended release tablet Take 10 mg by mouth as needed for Pain. Historical Provider, MD   Cholecalciferol 2000 UNITS TABS Take by mouth    Historical Provider, MD   Multiple Vitamins-Minerals (THERAPEUTIC MULTIVITAMIN-MINERALS) tablet Take 1 tablet by mouth daily    Historical Provider, MD   vitamin B-12 (CYANOCOBALAMIN) 500 MCG tablet Take 500 mcg by mouth daily    Historical Provider, MD   pregabalin (LYRICA) 150 MG capsule Take 1 capsule by mouth daily  Patient taking differently: Take 150 mg by mouth nightly. 6/10/16   Lorena Boyer MD   Biotin 5 MG CAPS Take 1 capsule by mouth daily    Historical Provider, MD       Current medications:    No current facility-administered medications for this visit. No current outpatient medications on file.      Facility-Administered Medications Ordered in Other Visits   Medication Dose Route Frequency Provider Last Rate Last Dose    antihemophilic factor-VWF (HUMATE-P) 500-1200 units infusion (dosed in factor VIII units) 3,800 Units  3,800 Units Intravenous Once Haydee Mattson MD        antihemophilic factor-VWF (HUMATE-P) 500-1200 units infusion (dosed in factor VIII units) 3,800 Units  3,800 Units Intravenous Once Haydee Mattson MD        [START ON 9/56/0229] antihemophilic factor-VWF (HUMATE-P) 500-1200 UNITS infusion (dosed in VWF units) 3,800 Units  3,800 Units Intravenous Q12H Haydee Mattson MD        ceFAZolin (ANCEF) 2 g in dextrose 5 % 50 mL IVPB  2 g Intravenous On Call to Fernando Warren MD        acetaminophen (TYLENOL) tablet 1,000 mg  1,000 mg Oral Once Haydee Mattson MD        dexamethasone (DECADRON) injection 10 mg  10 mg Intravenous Once Haydee Mattson MD        gabapentin (NEURONTIN) tablet 900 mg  900 mg Oral Once Haydee Mattson MD        scopolamine (TRANSDERM-SCOP) transdermal patch 1 patch  1 patch Transdermal Once Haydee Mattson MD        0.9 % sodium chloride infusion   Intravenous Continuous Miley Santoro MD           Allergies:     Allergies   Allergen Reactions    Adhesive Tape     Meperidine Itching    Morphine Other (See Comments)    Nubain  [Nalbuphine Hcl] Other (See Comments)     Nubain    Propoxyphene     Tramadol        Problem List:    Patient Active Problem List   Diagnosis Code    [place] [facility] Willebrand's disease ([facility] D68.0    Hypothyroid E03.9    Arnold-Chiari syndrome without spina bifida or hydrocephalus ([facility] Q07.00    Attention deficit disorder (ADD) without hyperactivity F98.8    Hepatocellular damage K76.9    Hypokalemia E87.6    Elevated liver enzymes R74.8    Chiari I malformation (HCC) G93.5    Osteoarthritis M19.90    Liver disease K76.9    Chronic back pain M54.9, G89.29    Fibromyalgia M79.7    Headache R51    Urinary incontinence R32    Obesity E66.9    Hepatic steatosis K76.0    Gastroesophageal reflux disease without esophagitis K21.9    Hyperglycemia R73.9    Acute gout of knee M10.9    Von Willebrand disease ([address] D68.0    Polymyalgia rheumatica (HCC) M35.3    Vitamin D deficiency E55.9    BMI 38.0-38.9,adult Z68.38       Past Medical History:        Diagnosis Date    Acid indigestion     hx of    Anesthesia      difficult intubation due to cranio cervical fusion (intubation by fiber optic of glidescope (Dr. Deya Marc [phone] orthopedic spinal surgion.)     Cancer [facility]) 1987    cervical    Chiari I malformation ([address]     Chronic back pain     Difficult intubation     hx. of cranio-cervical fusion , intubation by fiber optic glidescopy    Elevated liver enzymes 09/2016    Fibromyalgia     GERD (gastroesophageal reflux disease)     Headache     Hepatic steatosis     [place] (hard of hearing)     noel. ears    Kidney stones     Liver disease     Obesity     Osteoarthritis     PONV (postoperative nausea and vomiting)     Seizures ([address]     non epileptic, last     Thyroid disease     hypothyroid    Urinary incontinence     UTI (urinary tract infection)     Von Willebrand disease ([address]     Wears glasses        Past Surgical History:        Procedure Laterality Date    APPENDECTOMY      BACK SURGERY      lumbar surgery    BRAIN SURGERY      CARPAL TUNNEL RELEASE Right     CHOLECYSTECTOMY      COLONOSCOPY      DILATION AND CURETTAGE OF UTERUS      few times    HYSTERECTOMY      KNEE ARTHROSCOPY Bilateral     DE KNEE SCOPE,DIAGNOSTIC Bilateral 11/2/2017    KNEE ARTHROSCOPY RIGHT WITH PARTIAL MEDIAL MENISECTOMY, SUBTOTAL LATERAL MENISECTOMY, AND DEBRIDEMENT OF ACL /  NEEDLE ASPIRATION LEFT KNEE performed by Hilaria Carrasquillo MD at [address] [place] [address]        Social History:    Social History     Tobacco Use    Smoking status: Current Every Day Smoker     Packs/day: 0.25     Years: 20.00     Pack years: 5.00    Smokeless tobacco: Never Used   Substance Use Topics    Alcohol use: No     Alcohol/week: 0.0 standard drinks                                Ready to quit: Not Answered  Counseling given: Not Answered      Vital Signs (Current): There were no vitals filed for this visit. BP Readings from Last 3 Encounters:   08/24/20 102/67   06/30/20 115/78   06/26/20 124/78       NPO Status:                                                                                 BMI:   Wt Readings from Last 3 Encounters:   08/24/20 198 lb (89.8 kg)   06/30/20 198 lb (89.8 kg)   06/26/20 196 lb 4 oz (89 kg)     There is no height or weight on file to calculate BMI.    CBC:   Lab Results   Component Value Date    WBC 17.7 06/17/2020    RBC 5.44 06/17/2020    HGB 15.6 06/17/2020    HCT 47.4 06/17/2020    MCV 87.1 06/17/2020    RDW 15.8 06/17/2020     06/17/2020       CMP:   Lab Results   Component Value Date     06/17/2020    K 4.7 06/17/2020    CL 95 06/17/2020    CO2 26 06/17/2020    BUN 10 06/17/2020    CREATININE 0.72 06/17/2020    GFRAA >60 06/17/2020    LABGLOM >60 06/17/2020    GLUCOSE 110 06/17/2020    CALCIUM 10.0 06/17/2020       POC Tests: No results for input(s): POCGLU, POCNA, POCK, POCCL, POCBUN, POCHEMO, POCHCT in the last 72 hours.     Coags:   Lab Results   Component Value Date    PROTIME 13.0 06/17/2020    INR 1.0 06/17/2020    APTT 32.7 06/17/2020       HCG (If Applicable): No results found for: PREGTESTUR, PREGSERUM, HCG, HCGQUANT     ABGs: No results found for: PHART, PO2ART, GSE3IVS, TWI5SAN, BEART, N1OPMAPA     Type & Screen (If Applicable):  No results found for: LABABO, LABRH    Drug/Infectious Status (If Applicable):  No results found for: HIV, HEPCAB    COVID-19 Screening (If Applicable):   Lab Results   Component Value Date    COVID19 Not Detected 08/20/2020    COVID19 Not Detected 06/26/2020         Anesthesia Evaluation  Patient summary reviewed and Nursing notes reviewed no history of anesthetic

## 2020-08-24 NOTE — CARE COORDINATION
Relationship to Patient: SelfSigned ABN: N    Payor Name:  Katie Parham   Plan:  ANTHEM MEDIBLUE ESSENTIAL/PLUS   Group: [id number]  Worker's Comp Date of Injury:            [facility] Encounter Date/Time: 2020 30 BERONICA Desir Account: [de-identified]    MRN: [id number]    Patient: Keegan Samuels Serial #: [id number]      ENCOUNTER          Patient Class: Observation Private Enc? No Unit RM BD: [address]    Hospital Service: Med/Surg   ADM DX: Primary osteoarthritis o*   ADM Provider: Luly Rojas MD   Procedure: NC TOTAL KNEE ARTHROPLAS*   ATT Provider: Luly Rojas MD   REF Provider:        PATIENT                 Name: Laurent Nguyen : 1965 (49 yrs)   Address: [facility] Sex: Female   City: [address] [place] [address] [address] [facility]         Marital Status:    Employer: DISABLED         Christian: Christianity   Primary Care Provider: Rodolfo Hutchins MD         Primary Phone: [phone]   EMERGENCY CONTACT   Contact Name Legal Guardian? Relationship to Patient Home Phone Work Phone   1. Bhavin Machado  2. [facility]      Spouse  Brother/Sister [phone]              GUARANTOR            Guarantor: Laurent Nguyen     : 1965   Address: [address] [place] [address] Sex: Female     [place] [address]     Relation to Patient: Self       Home Phone: [phone]   Guarantor ID: [id number]       Work Phone:     Guarantor Employer: DISABLED         Status: DISABLED      COVERAGE        PRIMARY INSURANCE   Payor: [facility] MEDICARE Plan: ANTHEM MEDIBLUE ESSENTIA*   Payor Address: [facility] [id number] [place] [address]       Group Number: [id number] Type: INDEMNITY   Subscriber Name: Malcolmbhavin Snellond : 1965   Subscriber ID: [id number] Hope Poles. Rel. to Sub: Self   SECONDARY INSURANCE   Payor:   Plan:     Payor Address:  ,           Group Number:   Insurance Type:     Subscriber Name:   Subscriber :     Subscriber ID:   Pat.  Rel. to

## 2020-08-24 NOTE — PROGRESS NOTES
Physical Therapy    Facility/Department: [email or web address] MED [facility]G  Initial Assessment    NAME: Gallito Weaver  : 1965  MRN: [id number]    Date of Service: 2020    Discharge Recommendations: The patient would benefit from additional Physical  Therapy after discharge from the facility upon return to their Home. PT Equipment Recommendations  Equipment Needed: Yes  Mobility Devices: Rodolfo Mcdonald: Rolling    Assessment   Body structures, Functions, Activity limitations: Decreased functional mobility ; Decreased ADL status; Decreased ROM; Decreased strength;Decreased endurance;Decreased balance; Increased pain  Assessment: Pt requiring 1 assist for mobility with use of RW. Pt will benefit from continue therapy upon d/c to maximize her mobility and independence. Treatment Diagnosis: Impaired functional mobility 2* R knee OA  Specific instructions for Next Treatment: progress gait/stairs, family training if available, monitor O2 sats, Knee AROM, HEP  Prognosis: Good  Decision Making: Medium Complexity  History: R TKR by Dr Inna Mancini 20  Exam: ROM, MMT, bed mobility, transfers, amb, balance, activity tolerance  Clinical Presentation: Pt alert, talkative, agreeable to PT  Barriers to Learning: none  REQUIRES PT FOLLOW UP: Yes  Activity Tolerance  Activity Tolerance: Patient Tolerated treatment well       Patient Diagnosis(es): There were no encounter diagnoses. has a past medical history of Acid indigestion, Anesthesia, Cancer ([address] Chiari I malformation ([address] Chronic back pain, Difficult intubation, Elevated liver enzymes, Fibromyalgia, GERD (gastroesophageal reflux disease), Headache, Hepatic steatosis, [place] (hard of hearing), Kidney stones, Liver disease, Obesity, Osteoarthritis, PONV (postoperative nausea and vomiting), Seizures ([address] Thyroid disease, Urinary incontinence, UTI (urinary tract infection), Von Willebrand disease ([address] and Wears glasses.    has a past surgical history that includes Hysterectomy; brain surgery; shoulder surgery (Right); Cholecystectomy; back surgery; Dilation and curettage of uterus; Carpal tunnel release (Right); Knee arthroscopy (Bilateral); Colonoscopy; Appendectomy; pr knee scope,diagnostic (Bilateral, 11/2/2017); and Total knee arthroplasty (Right, 8/24/2020). Restrictions  Restrictions/Precautions  Restrictions/Precautions: Fall Risk, General Precautions, Surgical Protocols, Weight Bearing(WBAT RLE)  Required Braces or Orthoses?: No  Implants present? : Metal implants(R TKA 8/24/20; plate in skull; steel iva in back)  Lower Extremity Weight Bearing Restrictions  Right Lower Extremity Weight Bearing: Weight Bearing As Tolerated  Position Activity Restriction  Other position/activity restrictions: PT eval and treat  Vision/Hearing  Vision: Impaired  Vision Exceptions: Wears glasses at all times  Hearing: Exceptions to [facility] [address]  Hearing Exceptions: Hard of hearing/hearing concerns;Bilateral hearing aid(at home)     Subjective  General  Chart Reviewed: Yes  Patient assessed for rehabilitation services?: Yes  Additional Pertinent Hx: Von Willebrand disease, PONV, seizures, fibromyalgia, chiari I Malformation  Family / Caregiver Present: Yes()  Referring Practitioner: Dr Sujata Aguila  Referral Date : 08/24/20  Diagnosis: Primary OA R knee  Follows Commands: Within Functional Limits  Subjective  Subjective: Pt in bed, O2 on, agreeable to PT OT co eval. JETHRO [place]  Pain Screening  Patient Currently in Pain: Yes  Pain Assessment  Pain Assessment: 0-10  Pain Level: 9  Pain Type: Surgical pain  Pain Location: Knee  Pain Orientation: Right  Pain Descriptors: Stabbing  Pain Frequency: Continuous  Pain Onset: On-going  Clinical Progression: Not changed  Functional Pain Assessment: Prevents or interferes some active activities and ADLs  Non-Pharmaceutical Pain Intervention(s): Ambulation/Increased Activity;Cold applied;Distraction;Elevation;Repositioned; Rest  Response to Pain Intervention: Patient Satisfied  Vital Signs  Patient Currently in Pain: Yes  Oxygen Therapy  SpO2: 94 %  O2 Device: None (Room air)  Patient Observation  Observations: R LE ACE wrapped, O2 sats dropped to 91% post activity on room air - O2 back on pt, mild groggyness       Orientation  Orientation  Overall Orientation Status: Within Normal Limits  Social/Functional History  Social/Functional History  Lives With: Spouse  Type of Home: House  Home Layout: Two level, Performs ADL's on one level, Able to Live on Main level with bedroom/bathroom, Laundry in basement  Home Access: Stairs to enter without rails  Entrance Stairs - Number of Steps: 3  Bathroom Shower/Tub: Walk-in shower, Doors  Bathroom Toilet: Handicap height  Bathroom Equipment: Built-in shower seat, Grab bars in shower, Grab bars around toilet, Hand-held shower  Bathroom Accessibility: Walker accessible  Home Equipment: Cane, 4 wheeled walker, Reacher(adjustable bed)  Receives Help From: Family  ADL Assistance: Independent  Homemaking Assistance: Needs assistance(spouse does laundry in basement; shares cooking/cleaning)  Homemaking Responsibilities: Yes  Ambulation Assistance: Independent(with cane)  Transfer Assistance: Independent  Active : Yes  Mode of Transportation: [facility]  Occupation: Retired  Type of occupation:   Additional Comments: Pt reports that her friend will be staying with her to help PRN. Pt's son Bryson Spann will be staying with Pt as well. Between Pt's spouse, son, and friend there will be someone home with her 24/7.   Cognition        Objective          AROM RLE (degrees)  RLE AROM: WFL  RLE General AROM: Knee AROM 0-87  AROM LLE (degrees)  LLE AROM : WFL  AROM RUE (degrees)  RUE General AROM: See OT  AROM LUE (degrees)  LUE General AROM: See OT  Strength RLE  Strength RLE: WFL  Comment: Grossly 4-/5  Strength LLE  Strength LLE: WNL  Comment: Grossly 4 to 4-/5  Strength RUE  Comment: See OT  Strength LUE  Comment: See OT     Sensation  Overall Sensation Status: Impaired(reports numbness in RLE)  Bed mobility  Rolling to Right: Stand by assistance  Supine to Sit: Stand by assistance  Sit to Supine: Unable to assess  Scooting: Stand by assistance  Comment: Head of bed slightly elevated with use of bed rail. Pt has adjustable bed at home. Good management of R LE. No dizziness reported. O2 off for mobility and monitored O2 sats. Transfers  Sit to Stand: Contact guard assistance  Stand to sit: Contact guard assistance  Bed to Chair: Contact guard assistance  Stand Pivot Transfers: Contact guard assistance  Comment: CGA for transfers from bed, chair and toilet. Cues for hand placement with good carryover. No dizziness reported. Ambulation  Ambulation?: Yes  WB Status: Full WBAT R LE  Ambulation 1  Surface: level tile  Device: Rolling Walker  Assistance: Contact guard assistance  Quality of Gait: slow blayne, slight forward flexed trunk/hips, no LOB, pt steady  Gait Deviations: Slow Blayne  Distance: 15' x2  Comments: Good RW management. Education provided on positioning of R LE and knee flexion/extension every hour, standing/amb every 1-2 hours to prevent stiffness. Ice pack applied wtih family education with  on how to use ice pack cooler.   Stairs/Curb  Stairs?: No     Balance  Posture: Good  Sitting - Static: Good  Sitting - Dynamic: Good  Standing - Static: Good;-  Standing - Dynamic: Fair;+  Comments: low fall risk, standing balance with RW        Plan   Plan  Times per week: BID  Specific instructions for Next Treatment: progress gait/stairs, family training if available, monitor O2 sats, Knee AROM, HEP  Current Treatment Recommendations: Strengthening, ROM, Balance Training, Functional Mobility Training, Transfer Training, Endurance Training, Gait Training, Stair training, Equipment Evaluation, Education, & procurement, Patient/Caregiver Education & Training, Safety Education & Training, Home Exercise Program, Positioning  Safety Devices  Type of devices: All fall risk precautions in place, Call light within reach, Gait belt, Patient at risk for falls, Nurse notified, Left in chair  Restraints  Initially in place: No    G-Code       OutComes Score                                                  AM-PAC Score  AM-PAC Inpatient Mobility Raw Score : 16 (08/24/20 1440)  AM-PAC Inpatient T-Scale Score : 40.78 (08/24/20 1440)  Mobility Inpatient CMS 0-100% Score: 54.16 (08/24/20 1440)  Mobility Inpatient CMS G-Code Modifier : CK (08/24/20 1440)          Goals  Short term goals  Time Frame for Short term goals: 1-2 days  Short term goal 1: Pt to demonstrate Mod I bed mobility. Short term goal 2: Pt to demonstrate supervision with transfers. Short term goal 3: Pt to amb 100'-150' with supervision and device. Short term goal 4: Pt to ascend/descend 3 stairs per home entry set up, SBA/CGA with family training if present. Short term goal 5: Pt to improve R knee AROM 0-90 to improve independence and ease of mobility. Short term goal 6: Pt to demonstrate improved B LE strength with good technique of HEP. Patient Goals   Patient goals :  To go home       Therapy Time   Individual Concurrent Group Co-treatment   Time In 1440         Time Out 1510         Minutes 30         Timed Code Treatment Minutes: 10 Minutes       Arie Crenshaw PT

## 2020-08-24 NOTE — ANESTHESIA PROCEDURE NOTES
Peripheral Block    Patient location during procedure: PACU  Start time: 8/24/2020 12:26 PM  End time: 8/24/2020 12:30 PM  Staffing  Anesthesiologist: Bela Kimbrough MD  Performed: anesthesiologist   Preanesthetic Checklist  Completed: patient identified, site marked, surgical consent, pre-op evaluation, timeout performed, IV checked, risks and benefits discussed, monitors and equipment checked, anesthesia consent given, oxygen available and patient being monitored  Peripheral Block  Patient position: supine  Prep: ChloraPrep  Patient monitoring: cardiac monitor, continuous pulse ox, frequent blood pressure checks and IV access  Block type: Saphenous  Laterality: right  Injection technique: single-shot  Procedures: ultrasound guided  Local infiltration: lidocaine  Infiltration strength: 1 %  Dose: 3 mL  Provider prep: mask and sterile gloves  Local infiltration: lidocaine  Needle  Needle type: short-bevel   Needle gauge: 20 G  Needle length: 10 cm  Needle localization: ultrasound guidance  Catheter type: open end  Test dose: negative  Assessment  Injection assessment: negative aspiration for heme, no paresthesia on injection and local visualized surrounding nerve on ultrasound  Paresthesia pain: none  Slow fractionated injection: yes  Hemodynamics: stable  Medications Administered  Bupivacaine (MARCAINE) PF injection 0.5%, 10 mL  bupivacaine liposome (EXPAREL) injection 1.3%, 10 mL  Reason for block: post-op pain management and at surgeon's request

## 2020-08-24 NOTE — OP NOTE
Operative Note      Patient: Tai Abbasi  YOB: 1965  MRN: [id number]    Date of Procedure: 8/24/2020    Pre-Op Diagnosis: DJD RIGHT KNEE          UPDATE ON ADMIT PER ANES    Post-Op Diagnosis: Same       Procedure(s):  KNEE TOTAL ARTHROPLASTY right    Surgeon(s):  Kim Sosa MD    Assistant:   Resident: DO Jaz Roper CST  Anesthesia: General    Estimated Blood Loss (mL): Minimal    Complications: None    Specimens:   * No specimens in log *    Implants:  Implant Name Type Inv. Item Serial No.  Lot No. LRB No. Used Action   CEMENT BONE R 1X40 US Cement CEMENT BONE R 1X40 US  [employer] [id number] Right 1 Implanted   IMPL KNEE TIBIAL TRAY ANDRE COCR VIKKI 67MM Knee IMPL KNEE TIBIAL TRAY ANDRE COCR VIKKI 67MM  [employer] [id number] Right 1 Implanted   IMPL KNEE PATELLA COMP 3 PEG THIN 34 Knee IMPL KNEE PATELLA COMP 3 PEG THIN 34  [employer] [id number] Right 1 Implanted   IMPL KNEE VANGUARD CR FEM 62.5 MM RT INTERLINK Knee IMPL KNEE VANGUARD CR FEM 62.5 MM RT INTERLINK  [employer] [id number] Right 1 Implanted   IMPL KNEE TIB BEARING VANGUARD [id number] Knee IMPL KNEE TIB BEARING VANGUARD [id number]  [employer] [id number] Right 1 Implanted         Drains: * No LDAs found *    Findings: DJD right knee    Detailed Description of Procedure:     Patient is a 49 y.o. female with a long standing history of right DJD of the knee. Patient has failed all types of conservative treatment included physical therapy, weight-loss counseling, NSAIDS, and injections. The patient has significant restriction of activities of daily living and/or work/job place abilities, and, therefore, has been counseled for TKA. Patient is aware of all the risks and benefits as highlighted in the surgical consent form.      Patient has a history of a coagulopathy requiring humate infusion preoperatively for 2 hours as advised by her hematologist.  Plan is for patient to remain in the hospital overnight to have received several infusions the day after surgery    The patient was given 1.5 grams vancomycin of  in the holding area as well as an adductor canal block. The patient was then taken to the operating suite where general anesthesia was administered. A tourniquet was applied to effected leg and then prepped and draped in the usual sterile fashion. Time out was called to verify laterality. The leg was examined   and the tourniquet inflated to 300 mm of Hg. Midline incision was utilized and taken down to the joint capsule where a mid-vastus approach to the knee was performed. After a complete synovectomy, the patella was elevated, calibrated for thickness, and the above sized, patellar triple pegged drills guide positioned medially and then drilled. Atrial patellar button was positioned in order to re-established the original thickness. This was then replaced with a protective patellar plate. The knee was then flexed and revealed significant  arthrosis. Osteophytes were removed via rongeur. A drill hole was made in the distal femur and the femoral canal was then irrigated and suctioned. A fluted IM iva was inserted and a distal femoral cut was made at 5 degrees of valgus at the +2 setting. The proximal tibia was then exposed after resection of the ACL and lateral meniscus. An extra-medullary tibial cutting jug was then aligned in reference to the tibia tubercle and ankle mortise and positional with a slight posterior slope. The cut was made with minimal cutting of the lowest depth of the tibial wear and the fragment removed. The distal femur was then approached and femoral sizing guide with 3 degrees of external rotation was placed flush with the surface and drill holes made and femoral size determined. The appropriate size cutting jig was then placed and anterior, posterior, and chamfer cuts made with an oscillating saw. A laminar  was inserted with care to avoid damaging the MCL.  Posterior osteophytes were removed and the capsule stripped from the posterior femoral condyles. The capsule was then injected with the orthopedic cocktail at this time. The tibial cut was then assessed with a baseplate and alignment iva to assure proper cut. Spacer blocks were then inserted and the knee was assessed to determine the amount of soft tissue release and osteophyte removal necessary  to establish symmetric flexion and extension gaps. The tibia was then elevated, and the above sized tibial plate was positioned for proper external rotation with an alignment iva down the middle-third of the tibial tubercles. This was pinned in place, the proximal tibialis reamed, the fins were then repacked. The appropriate size femur was positioned and various size of the polyethylene trials were inserted. The knee was assessed for  ROM and patellar tracking. Satisfied with this, this distal femoral logs were drilled and then all the trial components were removed. The knee was irrigated and dried while the cement was prepared. Cement was applied to to both implant and cut surfaces and the implants impacted and the compression with one size larger poly inserted and excess cement removed. The patella was placed and compressed as well. The knee was placed in full extension and then injected with the remainder  of the 100ml of the orthopedic cocktail. The Irrisept lavage was carried out for the 1 minutes. This was then irrigated and a permanent polyethylene was insert was injected with platelet rich/poor plasma and the tourniquet was released at 44 minutes. Hemostasis was excellent. The knee was closed with a #1 Strata-Fix and vastus with a double-layer of O-Vicryl suture. The subcutaneous tissue closed with a 2-0 Monocryl Strata-Fix  and then a Zip-line used for the skin. This was covered with adaptic and Aquacel dressing and dressed with a large 6-inch Ace dressing from toes to mid-thigh.  The patient was awakened and taken to

## 2020-08-25 PROCEDURE — 97110 THERAPEUTIC EXERCISES: CPT

## 2020-08-25 PROCEDURE — 6360000002 HC RX W HCPCS: Performed by: ORTHOPAEDIC SURGERY

## 2020-08-25 PROCEDURE — 97530 THERAPEUTIC ACTIVITIES: CPT

## 2020-08-25 PROCEDURE — 6360000002 HC RX W HCPCS: Performed by: FAMILY MEDICINE

## 2020-08-25 PROCEDURE — 6370000000 HC RX 637 (ALT 250 FOR IP): Performed by: FAMILY MEDICINE

## 2020-08-25 PROCEDURE — 99024 POSTOP FOLLOW-UP VISIT: CPT | Performed by: ORTHOPAEDIC SURGERY

## 2020-08-25 PROCEDURE — 6370000000 HC RX 637 (ALT 250 FOR IP): Performed by: ORTHOPAEDIC SURGERY

## 2020-08-25 PROCEDURE — 2580000003 HC RX 258: Performed by: ORTHOPAEDIC SURGERY

## 2020-08-25 PROCEDURE — G0378 HOSPITAL OBSERVATION PER HR: HCPCS

## 2020-08-25 PROCEDURE — 97535 SELF CARE MNGMENT TRAINING: CPT

## 2020-08-25 PROCEDURE — 97116 GAIT TRAINING THERAPY: CPT

## 2020-08-25 RX ADMIN — LEVOTHYROXINE SODIUM 50 MCG: 0.05 TABLET ORAL at 05:34

## 2020-08-25 RX ADMIN — ANTIHEMOPHILIC FACTOR/VON WILLEBRAND FACTOR COMPLEX (HUMAN) 3800 UNITS: KIT at 00:50

## 2020-08-25 RX ADMIN — HYDROCHLOROTHIAZIDE 25 MG: 25 TABLET ORAL at 08:55

## 2020-08-25 RX ADMIN — Medication 2000 UNITS: at 08:55

## 2020-08-25 RX ADMIN — OXYCODONE HYDROCHLORIDE 10 MG: 10 TABLET ORAL at 17:54

## 2020-08-25 RX ADMIN — ATORVASTATIN CALCIUM 20 MG: 20 TABLET, FILM COATED ORAL at 08:55

## 2020-08-25 RX ADMIN — PANTOPRAZOLE SODIUM 40 MG: 40 TABLET, DELAYED RELEASE ORAL at 05:34

## 2020-08-25 RX ADMIN — SODIUM CHLORIDE, POTASSIUM CHLORIDE, SODIUM LACTATE AND CALCIUM CHLORIDE: 600; 310; 30; 20 INJECTION, SOLUTION INTRAVENOUS at 19:15

## 2020-08-25 RX ADMIN — OXYCODONE HYDROCHLORIDE 30 MG: 15 TABLET, FILM COATED, EXTENDED RELEASE ORAL at 08:56

## 2020-08-25 RX ADMIN — CYANOCOBALAMIN TAB 500 MCG 500 MCG: 500 TAB at 08:55

## 2020-08-25 RX ADMIN — PREDNISONE 2.5 MG: 5 TABLET ORAL at 08:56

## 2020-08-25 RX ADMIN — OMEGA-3-ACID ETHYL ESTERS 2000 MG: 1 CAPSULE, LIQUID FILLED ORAL at 08:52

## 2020-08-25 RX ADMIN — OXYCODONE HYDROCHLORIDE 10 MG: 10 TABLET ORAL at 01:07

## 2020-08-25 RX ADMIN — ANTIHEMOPHILIC FACTOR/VON WILLEBRAND FACTOR COMPLEX (HUMAN) 3800 UNITS: KIT at 13:11

## 2020-08-25 RX ADMIN — METFORMIN HYDROCHLORIDE 1000 MG: 500 TABLET, EXTENDED RELEASE ORAL at 08:55

## 2020-08-25 RX ADMIN — OXYCODONE 5 MG: 5 TABLET ORAL at 11:48

## 2020-08-25 RX ADMIN — SODIUM CHLORIDE, POTASSIUM CHLORIDE, SODIUM LACTATE AND CALCIUM CHLORIDE: 600; 310; 30; 20 INJECTION, SOLUTION INTRAVENOUS at 04:27

## 2020-08-25 RX ADMIN — POTASSIUM CHLORIDE 40 MEQ: 1500 TABLET, EXTENDED RELEASE ORAL at 08:56

## 2020-08-25 RX ADMIN — OXYCODONE HYDROCHLORIDE 30 MG: 15 TABLET, FILM COATED, EXTENDED RELEASE ORAL at 21:25

## 2020-08-25 RX ADMIN — MULTIPLE VITAMINS W/ MINERALS TAB 1 TABLET: TAB at 08:55

## 2020-08-25 RX ADMIN — NORTRIPTYLINE HYDROCHLORIDE 50 MG: 50 CAPSULE ORAL at 19:49

## 2020-08-25 RX ADMIN — HYDROMORPHONE HYDROCHLORIDE 1 MG: 1 INJECTION, SOLUTION INTRAMUSCULAR; INTRAVENOUS; SUBCUTANEOUS at 15:46

## 2020-08-25 RX ADMIN — PREGABALIN 150 MG: 150 CAPSULE ORAL at 19:50

## 2020-08-25 RX ADMIN — SPIRONOLACTONE 50 MG: 25 TABLET, FILM COATED ORAL at 08:52

## 2020-08-25 RX ADMIN — OXYCODONE HYDROCHLORIDE 10 MG: 10 TABLET ORAL at 05:34

## 2020-08-25 RX ADMIN — SENNOSIDES AND DOCUSATE SODIUM 1 TABLET: 8.6; 5 TABLET ORAL at 08:55

## 2020-08-25 ASSESSMENT — PAIN SCALES - GENERAL
PAINLEVEL_OUTOF10: 6
PAINLEVEL_OUTOF10: 7
PAINLEVEL_OUTOF10: 7
PAINLEVEL_OUTOF10: 10
PAINLEVEL_OUTOF10: 10
PAINLEVEL_OUTOF10: 7
PAINLEVEL_OUTOF10: 6
PAINLEVEL_OUTOF10: 6

## 2020-08-25 ASSESSMENT — PAIN DESCRIPTION - LOCATION
LOCATION: KNEE
LOCATION_2: HEAD
LOCATION: KNEE

## 2020-08-25 ASSESSMENT — PAIN DESCRIPTION - ORIENTATION
ORIENTATION: RIGHT
ORIENTATION: RIGHT

## 2020-08-25 ASSESSMENT — PAIN DESCRIPTION - PAIN TYPE
TYPE: SURGICAL PAIN
TYPE: SURGICAL PAIN
TYPE_2: CHRONIC PAIN

## 2020-08-25 ASSESSMENT — PAIN DESCRIPTION - PROGRESSION: CLINICAL_PROGRESSION: NOT CHANGED

## 2020-08-25 NOTE — PROGRESS NOTES
Writer spoke to Dr. Arianna Tapia, Dr. Arianna Tapia states to [facility] patient be\"

## 2020-08-25 NOTE — PROGRESS NOTES
Post Operative Progress Note    8/25/2020 10:04 AM  Info:   Admit Date: 8/24/2020  PCP: Chris Arriaza MD      Medications:   Scheduled Meds:   antihemophilic factor-VWF  0,005 Units Intravenous Q12H    sodium chloride flush  10 mL Intravenous 2 times per day    sennosides-docusate sodium  1 tablet Oral BID    atorvastatin  20 mg Oral Daily    Vitamin D  2,000 Units Oral Daily    hydroCHLOROthiazide  25 mg Oral Daily    potassium chloride  40 mEq Oral Daily with breakfast    levothyroxine  50 mcg Oral Daily    liothyronine  5 mcg Oral Every Other Day    metFORMIN  1,000 mg Oral Daily    therapeutic multivitamin-minerals  1 tablet Oral Daily    nortriptyline  50 mg Oral Nightly    omega-3 acid ethyl esters  2,000 mg Oral Daily    pantoprazole  40 mg Oral QAM AC    oxyCODONE  30 mg Oral Q12H    predniSONE  2.5 mg Oral Daily    pregabalin  150 mg Oral Nightly    spironolactone  50 mg Oral Daily    Liraglutide  1.2 mg Subcutaneous Daily    vitamin B-12  500 mcg Oral Daily     Continuous Infusions:   lactated ringers 125 mL/hr at 08/25/20 0427     PRN Meds:sodium chloride flush, oxyCODONE **OR** oxyCODONE, HYDROmorphone **OR** HYDROmorphone, magnesium hydroxide, tiZANidine    Diet:   Diet: DIET GENERAL;    Subjective:   Systemic or Specific Complaints:No Complaints    Objective:     Patient Vitals for the past 24 hrs:   BP Temp Temp src Pulse Resp SpO2 Height Weight   08/25/20 0652 (!) 90/56 98.1 °F (36.7 °C) Oral 92 18 92 % -- --   08/25/20 0112 (!) 111/52 -- Oral 101 -- -- -- --   08/25/20 0059 (!) 103/55 -- -- 97 16 -- -- --   08/24/20 2337 (!) 96/47 98.2 °F (36.8 °C) Oral 98 16 94 % -- --   08/24/20 1819 (!) 105/50 97.7 °F (36.5 °C) Oral 104 18 95 % -- --   08/24/20 1440 -- -- -- -- -- 94 % -- --   08/24/20 1344 (!) 126/49 97.9 °F (36.6 °C) Oral 101 18 94 % 5' 2\" (1.575 m) 216 lb 4.3 oz (98.1 kg)   08/24/20 1330 107/63 97.7 °F (36.5 °C) Infrared 101 17 92 % -- --   08/24/20 1320 (!) 141/60 -- -- 100 14 94 % -- --   08/24/20 1310 105/61 -- -- 100 12 90 % -- --   08/24/20 1300 109/60 -- -- 99 11 (!) 89 % -- --   08/24/20 1250 (!) 108/57 -- -- 100 11 (!) 88 % -- --   08/24/20 1240 116/62 97.5 °F (36.4 °C) Infrared 100 11 94 % -- --   08/24/20 1230 116/67 -- -- 101 12 93 % -- --   08/24/20 1220 114/61 -- -- 99 11 93 % -- --   08/24/20 1210 (!) 118/59 -- -- 98 10 93 % -- --   08/24/20 1202 133/77 97.7 °F (36.5 °C) Infrared 100 12 95 % -- --         Wound: incision clean and dry without sign of infection   Motion: expected discomfort with range of motion in affected extremity   DVT Exam:  no evidence of DVT on physical examination         Data Review  CBC: No results for input(s): WBC, HGB, PLT in the last 72 hours. Assessment:   Patient is S/P right Knee, Arthoplasty  Pain is well controlled. She has no nausea and no vomiting.     Current activity is up with assistance        Plan:      1:  Continue Physical Therapy  2:  Continue Deep venous thrombosis prophylaxis  3:  Continue Pain Control  4:  Discharge plans home after last humate dose       Electronically signed by Michoacano Roberts MD on 8/25/2020 at 10:05 AM

## 2020-08-25 NOTE — PLAN OF CARE
Problem: Pain:  Goal: Pain level will decrease  Description: Pain level will decrease  8/25/2020 1834 by Bennett Mohs, RN  Outcome: Ongoing     Problem: Pain:  Goal: Control of acute pain  Description: Control of acute pain  8/25/2020 1834 by Bennett Mohs, RN  Outcome: Ongoing     Problem: Pain:  Goal: Control of chronic pain  Description: Control of chronic pain  8/25/2020 1834 by Bennett Mohs, RN  Outcome: Ongoing     Problem: Musculor/Skeletal Functional Status  Goal: Highest potential functional level  8/25/2020 1834 by Bennett Mohs, RN  Outcome: Ongoing     Problem: Musculor/Skeletal Functional Status  Goal: Absence of falls  8/25/2020 1834 by Bennett Mohs, RN  Outcome: Ongoing     Problem: Musculor/Skeletal Functional Status  Goal: Highest potential functional level  8/25/2020 1834 by Bennett Mohs, RN  Outcome: Ongoing     Problem: Musculor/Skeletal Functional Status  Goal: Absence of falls  8/25/2020 1834 by Bennett Mohs, RN  Outcome: Ongoing     Problem: Falls - Risk of:  Goal: Will remain free from falls  Description: Will remain free from falls  8/25/2020 1834 by Bennett Mohs, RN  Outcome: Ongoing     Problem: Falls - Risk of:  Goal: Absence of physical injury  Description: Absence of physical injury  8/25/2020 1834 by Bennett Mohs, RN  Outcome: Ongoing

## 2020-08-25 NOTE — PLAN OF CARE
Problem: Pain:  Goal: Pain level will decrease  Description: Pain level will decrease  Outcome: Ongoing  Goal: Control of acute pain  Description: Control of acute pain  Outcome: Ongoing  Goal: Control of chronic pain  Description: Control of chronic pain  Outcome: Ongoing    Pain has been manageable during this shift. See MAR     Problem: Musculor/Skeletal Functional Status  Goal: Highest potential functional level  Outcome: Ongoing  Goal: Absence of falls  Outcome: Ongoing    Problem: Falls - Risk of:  Goal: Will remain free from falls  Description: Will remain free from falls  Outcome: Ongoing  Goal: Absence of physical injury  Description: Absence of physical injury  Outcome: Ongoing    Patient has remained free from falls and absent from physical injury during this shift.

## 2020-08-25 NOTE — PROGRESS NOTES
Physical Therapy  Facility/Department: [facility] MED SURG  Daily Treatment Note  NAME: Juan R Willingham  : 1965  MRN: [id number]    Date of Service: 2020    Discharge Recommendations: The patient would benefit from additional Physical  Therapy after discharge from the facility upon return to their Home. PT Equipment Recommendations  Equipment Needed: Yes  Mobility Devices: Berneice Powers: Rolling  Other: Patient educated it is unsafe to ambulate with rollator at this time. Assessment   Body structures, Functions, Activity limitations: Decreased functional mobility ; Decreased ADL status; Decreased ROM; Decreased strength;Decreased endurance;Decreased balance; Increased pain  Treatment Diagnosis: Impaired functional mobility 2* R knee OA  History: R TKR by Dr Ellis Oakes 20  Barriers to Learning: none  REQUIRES PT FOLLOW UP: Yes  Activity Tolerance  Activity Tolerance: Patient Tolerated treatment well     Patient Diagnosis(es): There were no encounter diagnoses. has a past medical history of Acid indigestion, Anesthesia, Cancer ([address] Chiari I malformation ([address] Chronic back pain, Difficult intubation, Elevated liver enzymes, Fibromyalgia, GERD (gastroesophageal reflux disease), Headache, Hepatic steatosis, [place] (hard of hearing), Kidney stones, Liver disease, Obesity, Osteoarthritis, PONV (postoperative nausea and vomiting), Seizures ([address] Thyroid disease, Urinary incontinence, UTI (urinary tract infection), Von Willebrand disease ([address] and Wears glasses. has a past surgical history that includes Hysterectomy; brain surgery; shoulder surgery (Right); Cholecystectomy; back surgery; Dilation and curettage of uterus; Carpal tunnel release (Right); Knee arthroscopy (Bilateral); Colonoscopy; Appendectomy; pr knee scope,diagnostic (Bilateral, 2017); and Total knee arthroplasty (Right, 2020).     Restrictions  Restrictions/Precautions  Restrictions/Precautions: Fall Risk, General Precautions, Yes  Other exercises 1: Seated (R) LE exercises x10  Other exercises 2: AAROM floor slides for right knee flexion x10 (hold 15 seconds each)  Other exercises 3: Provided patient with HEP, all questions answered at this time. Orange minimal resistance theraband provided for strengthening. Cryotherapy (Minutes\Location): Surgical cold pack placed on right knee at end of session. Patient educated on how to fill/drain cold pack and the waterbottle mechanisms. Educated on benefits of cryotherapy post-op. Goals  Short term goals  Time Frame for Short term goals: 1-2 days  Short term goal 1: Pt to demonstrate Mod I bed mobility. Short term goal 2: Pt to demonstrate supervision with transfers. Short term goal 3: Pt to amb 100'-150' with supervision and device. Short term goal 4: Pt to ascend/descend 3 stairs per home entry set up, SBA/CGA with family training if present. Short term goal 5: Pt to improve R knee AROM 0-90 to improve independence and ease of mobility. Short term goal 6: Pt to demonstrate improved B LE strength with good technique of HEP. Patient Goals   Patient goals : To go home    Plan    Plan  Times per week: BID  Specific instructions for Next Treatment: progress gait/stairs, family training if available, monitor O2 sats, Knee AROM, HEP  Current Treatment Recommendations: Strengthening, ROM, Balance Training, Functional Mobility Training, Transfer Training, Endurance Training, Gait Training, Stair training, Equipment Evaluation, Education, & procurement, Patient/Caregiver Education & Training, Safety Education & Training, Home Exercise Program, Positioning  Safety Devices  Type of devices:  All fall risk precautions in place, Call light within reach, Gait belt, Patient at risk for falls, Left in chair, Nurse notified  Restraints  Initially in place: No     Therapy Time   Individual Concurrent Group Co-treatment   Time In 0809         Time Out 0839         Minutes [address] [employer] [place] Leoncio Tran, PTA

## 2020-08-25 NOTE — CONSULTS
Family Medicine Consult Note    PCP: Chela Barton MD    Date of Admission: 8/24/2020    Date of Service: Pt seen/examined on 8/25/2020 and  Placed in Observation. Chief Complaint:  DJD right knee      History Of Present Illness: The patient is a 49 y.o. female who presents to [facility] with DJD right knee who had TKA 8/24/2020. She has no complaints.     Past Medical History:        Diagnosis Date    Acid indigestion     hx of    Anesthesia      difficult intubation due to cranio cervical fusion (intubation by fiber optic of glidescope (Dr. Saab Boards [phone] orthopedic spinal surgion.)     Cancer [facility]) 1987    cervical    Chiari I malformation ([address]     Chronic back pain     Difficult intubation     hx. of cranio-cervical fusion , intubation by fiber optic glidescopy    Elevated liver enzymes 09/2016    Fibromyalgia     GERD (gastroesophageal reflux disease)     Headache     Hepatic steatosis     [place] (hard of hearing)     noel. ears    Kidney stones     Liver disease     Obesity     Osteoarthritis     PONV (postoperative nausea and vomiting)     Seizures ([address]     non epileptic, last     Thyroid disease     hypothyroid    Urinary incontinence     UTI (urinary tract infection)     Von Willebrand disease ([address]     Wears glasses        Past Surgical History:        Procedure Laterality Date    APPENDECTOMY      BACK SURGERY      lumbar surgery    BRAIN SURGERY      CARPAL TUNNEL RELEASE Right     CHOLECYSTECTOMY      COLONOSCOPY      DILATION AND CURETTAGE OF UTERUS      few times    HYSTERECTOMY      KNEE ARTHROSCOPY Bilateral     MS KNEE SCOPE,DIAGNOSTIC Bilateral 11/2/2017    KNEE ARTHROSCOPY RIGHT WITH PARTIAL MEDIAL MENISECTOMY, SUBTOTAL LATERAL MENISECTOMY, AND DEBRIDEMENT OF ACL /  NEEDLE ASPIRATION LEFT KNEE performed by Davion Molina MD at [address] Right     TOTAL KNEE ARTHROPLASTY Right 8/24/2020    KNEE TOTAL nortriptyline (PAMELOR) 50 MG capsule TAKE 3 CAPSULES BY MOUTH AT BEDTIME 8/15/19  Yes Estuardo Hamilton MD   lidocaine viscous hcl (XYLOCAINE) 2 % SOLN solution Apply with a swab to mouth lesions every hour as needed for pain 7/3/19  Yes Estuardo Hamilton MD   oxyCODONE (OXYCONTIN) 30 MG T12A extended release tablet Take 1 tablet by mouth every 12 hours. Yes Historical Provider, MD   triamcinolone (KENALOG) 0.025 % cream Apply topically 2 times daily. 6/29/18  Yes Estuardo Hamilton MD   Cholecalciferol 2000 UNITS TABS Take by mouth   Yes Historical Provider, MD   Multiple Vitamins-Minerals (THERAPEUTIC MULTIVITAMIN-MINERALS) tablet Take 1 tablet by mouth daily   Yes Historical Provider, MD   pregabalin (LYRICA) 150 MG capsule Take 1 capsule by mouth daily  Patient taking differently: Take 150 mg by mouth nightly. 6/10/16  Yes Estuardo Hamilton MD   Biotin 5 MG CAPS Take 1 capsule by mouth daily   Yes Historical Provider, MD   diazePAM (VALIUM) 5 MG tablet Take 1 tablet by mouth every 8 hours as needed for Anxiety for up to 30 days. 8/24/20 9/23/20  MD Leigh Ann Yeh by Does not apply route 5 years, cannot walk over 200 ft. 7/30/20   MD Leigh Ann Yeh by Does not apply route 5 years, cannot walk over 200 ft. 7/30/20   Estuardo Hamilton MD   valACYclovir (VALTREX) 500 MG tablet TAKE 1 TABLET BY MOUTH DAILY AS NEEDED (LESIONS) 1/6/20   Estuardo Hamilton MD   Insulin Pen Needle (B-D ULTRAFINE III SHORT PEN) 31G X 8 MM MISC Inject 1 each into the skin daily May substitute with any brand 6/22/19   Estuardo Hamilton MD   vitamin B-12 (CYANOCOBALAMIN) 500 MCG tablet Take 500 mcg by mouth daily    Historical Provider, MD       Allergies:  Adhesive tape; Ddavp [desmopressin acetate]; Meperidine; Morphine; Nubain  [nalbuphine hcl];  Propoxyphene; Tramadol; and Tylenol [acetaminophen]    Social History:  The patient currently lives at home with spouse    TOBACCO:   reports that she has been smoking. She has a 5.00 pack-year smoking history. She has never used smokeless tobacco.  ETOH:   reports no history of alcohol use. Family History:          Problem Relation Age of Onset    Kidney Disease Mother     Breast Cancer Mother         breast    Kidney Disease Sister     Breast Cancer Sister         breast    Kidney Disease Brother     Breast Cancer Maternal Grandmother         breast    Breast Cancer Paternal Grandmother         breast       PHYSICAL EXAM:    BP (!) 90/56   Pulse 92   Temp 98.1 °F (36.7 °C) (Oral)   Resp 18   Ht 5' 2\" (1.575 m)   Wt 216 lb 4.3 oz (98.1 kg)   SpO2 92%   BMI 39.56 kg/m²     General appearance: No apparent distress appears stated age and cooperative. HEENT Normal cephalic, atraumatic without obvious deformity. Neck: Supple, No jugular venous distention/bruits. Lungs: Clear to auscultation, bilaterally without rales/wheezes/rhonchi with good respiratory effort. Heart: Regular rate and rhythm with Normal S1/S2 without murmurs, rubs or gallops  Mental status: Alert, oriented, thought content appropriate. CBC   No results for input(s): WBC, HGB, HCT, PLT in the last 72 hours. RENAL  No results for input(s): NA, K, CL, CO2, PHOS, BUN, CREATININE in the last 72 hours. Invalid input(s): CA  LFT'S  No results for input(s): AST, ALT, ALB, BILIDIR, BILITOT, ALKPHOS in the last 72 hours. COAG  No results for input(s): INR in the last 72 hours. CARDIAC ENZYMES  No results for input(s): CKTOTAL, CKMB, CKMBINDEX, TROPONINI in the last 72 hours.     U/A:    Lab Results   Component Value Date    NITRITE neg 07/11/2017    COLORU YELLOW 06/17/2020    CLARITYU cloudy 07/11/2017    SPECGRAV 1.005 06/17/2020    LEUKOCYTESUR NEGATIVE 06/17/2020    BLOODU small 07/11/2017    GLUCOSEU NEGATIVE 06/17/2020       ABG  No results found for: QQG2HJG, BEART, H5LLNLAL, PHART, THGBART, RRA7ZLW, PO2ART, Maggy Soto [address] Problems    Diagnosis Date Noted    Primary osteoarthritis of right knee [M17.11] 08/24/2020    [facility] [M35.3] 01/16/2018    Von Willebrand disease ([facility] [D68.0]     Hyperglycemia [R73.9] 01/06/2017    Gastroesophageal reflux disease without esophagitis [K21.9] 12/20/2016    Hepatic steatosis [K76.0]     Fibromyalgia [M79.7]     Hypokalemia [E87.6] 09/15/2016    Arnold-Chiari syndrome without spina bifida or hydrocephalus ([facility] [Q07.00] 05/18/2015    Attention deficit disorder (ADD) without hyperactivity [F98.8] 05/18/2015    Hypothyroid [E03.9] 05/18/2015         ASSESSMENT/PLAN:    S/p TKA right knee    PMR - steroids    Von Willebrand disease    Hyperglycemia - metformin    Chronic pain d/c Chiari formation and fibromyalgia -continue pain meds      DVT Prophylaxis: per ortho  Diet: DIET GENERAL;  Code Status: Full Code      Dispo - obs on ortho, d/c today      Leticia Villegas MD, FAAFP  8/25/2020, 10:10 AM

## 2020-08-25 NOTE — CARE COORDINATION
ONGOING DISCHARGE PLAN:    Reviewed patients chart regarding discharge plan and chart still confirms the plan is to discharge to home with [facility]   [facility] will deliver walker today   Patient is ok to discharge after last humate dose which will be given on 8/26    Will review plan with patient and or family      Will continue to follow for additional discharge needs.      Electronically signed by Brian Alba RN on 8/25/2020 at 12:19 PM

## 2020-08-25 NOTE — PROGRESS NOTES
Dr Jenni Tubbs ordered Dilaudid 1 mg iv once for headache. No distress noted.  Will continue to monitor

## 2020-08-26 VITALS
OXYGEN SATURATION: 97 % | HEART RATE: 105 BPM | BODY MASS INDEX: 40.45 KG/M2 | RESPIRATION RATE: 16 BRPM | HEIGHT: 62 IN | SYSTOLIC BLOOD PRESSURE: 122 MMHG | DIASTOLIC BLOOD PRESSURE: 62 MMHG | WEIGHT: 219.8 LBS | TEMPERATURE: 97.6 F

## 2020-08-26 LAB — RISTOCETIN CO-FACTOR: 129 % (ref 51–215)

## 2020-08-26 PROCEDURE — 97110 THERAPEUTIC EXERCISES: CPT

## 2020-08-26 PROCEDURE — 97116 GAIT TRAINING THERAPY: CPT

## 2020-08-26 PROCEDURE — 2580000003 HC RX 258: Performed by: ORTHOPAEDIC SURGERY

## 2020-08-26 PROCEDURE — 99024 POSTOP FOLLOW-UP VISIT: CPT | Performed by: ORTHOPAEDIC SURGERY

## 2020-08-26 PROCEDURE — 97535 SELF CARE MNGMENT TRAINING: CPT

## 2020-08-26 PROCEDURE — 6370000000 HC RX 637 (ALT 250 FOR IP): Performed by: ORTHOPAEDIC SURGERY

## 2020-08-26 PROCEDURE — G0378 HOSPITAL OBSERVATION PER HR: HCPCS

## 2020-08-26 PROCEDURE — 6370000000 HC RX 637 (ALT 250 FOR IP): Performed by: FAMILY MEDICINE

## 2020-08-26 PROCEDURE — 97530 THERAPEUTIC ACTIVITIES: CPT

## 2020-08-26 PROCEDURE — 6360000002 HC RX W HCPCS: Performed by: ORTHOPAEDIC SURGERY

## 2020-08-26 RX ADMIN — ANTIHEMOPHILIC FACTOR/VON WILLEBRAND FACTOR COMPLEX (HUMAN) 3800 UNITS: KIT at 00:51

## 2020-08-26 RX ADMIN — ANTIHEMOPHILIC FACTOR/VON WILLEBRAND FACTOR COMPLEX (HUMAN) 3800 UNITS: KIT at 13:47

## 2020-08-26 RX ADMIN — SPIRONOLACTONE 50 MG: 25 TABLET, FILM COATED ORAL at 08:01

## 2020-08-26 RX ADMIN — HYDROCHLOROTHIAZIDE 25 MG: 25 TABLET ORAL at 08:00

## 2020-08-26 RX ADMIN — SODIUM CHLORIDE, POTASSIUM CHLORIDE, SODIUM LACTATE AND CALCIUM CHLORIDE: 600; 310; 30; 20 INJECTION, SOLUTION INTRAVENOUS at 04:48

## 2020-08-26 RX ADMIN — MULTIPLE VITAMINS W/ MINERALS TAB 1 TABLET: TAB at 08:00

## 2020-08-26 RX ADMIN — Medication 2000 UNITS: at 08:00

## 2020-08-26 RX ADMIN — OXYCODONE HYDROCHLORIDE 10 MG: 10 TABLET ORAL at 04:38

## 2020-08-26 RX ADMIN — PANTOPRAZOLE SODIUM 40 MG: 40 TABLET, DELAYED RELEASE ORAL at 05:39

## 2020-08-26 RX ADMIN — ATORVASTATIN CALCIUM 20 MG: 20 TABLET, FILM COATED ORAL at 08:00

## 2020-08-26 RX ADMIN — LEVOTHYROXINE SODIUM 50 MCG: 0.05 TABLET ORAL at 05:39

## 2020-08-26 RX ADMIN — LIOTHYRONINE SODIUM 5 MCG: 5 TABLET ORAL at 08:02

## 2020-08-26 RX ADMIN — OXYCODONE HYDROCHLORIDE 30 MG: 15 TABLET, FILM COATED, EXTENDED RELEASE ORAL at 08:01

## 2020-08-26 RX ADMIN — POTASSIUM CHLORIDE 40 MEQ: 1500 TABLET, EXTENDED RELEASE ORAL at 08:00

## 2020-08-26 RX ADMIN — METFORMIN HYDROCHLORIDE 1000 MG: 500 TABLET, EXTENDED RELEASE ORAL at 08:00

## 2020-08-26 RX ADMIN — PREDNISONE 2.5 MG: 5 TABLET ORAL at 08:01

## 2020-08-26 RX ADMIN — OXYCODONE HYDROCHLORIDE 10 MG: 10 TABLET ORAL at 11:32

## 2020-08-26 RX ADMIN — CYANOCOBALAMIN TAB 500 MCG 500 MCG: 500 TAB at 08:00

## 2020-08-26 RX ADMIN — OXYCODONE HYDROCHLORIDE 10 MG: 10 TABLET ORAL at 15:43

## 2020-08-26 ASSESSMENT — PAIN DESCRIPTION - PAIN TYPE
TYPE: SURGICAL PAIN
TYPE: SURGICAL PAIN

## 2020-08-26 ASSESSMENT — PAIN DESCRIPTION - DESCRIPTORS
DESCRIPTORS: THROBBING
DESCRIPTORS: THROBBING

## 2020-08-26 ASSESSMENT — PAIN SCALES - GENERAL
PAINLEVEL_OUTOF10: 5
PAINLEVEL_OUTOF10: 8
PAINLEVEL_OUTOF10: 10

## 2020-08-26 ASSESSMENT — PAIN DESCRIPTION - LOCATION
LOCATION: KNEE
LOCATION: KNEE

## 2020-08-26 ASSESSMENT — PAIN DESCRIPTION - PROGRESSION: CLINICAL_PROGRESSION: NOT CHANGED

## 2020-08-26 ASSESSMENT — PAIN DESCRIPTION - ORIENTATION
ORIENTATION: RIGHT
ORIENTATION: RIGHT

## 2020-08-26 NOTE — PLAN OF CARE
Problem: Pain:  Goal: Pain level will decrease  Description: Pain level will decrease  8/26/2020 0457 by Janes Johnson RN  Outcome: Ongoing     Problem: Pain:  Goal: Control of acute pain  Description: Control of acute pain  8/26/2020 0457 by Janes Johnson RN  Outcome: Ongoing     Problem: Pain:  Goal: Control of chronic pain  Description: Control of chronic pain  8/26/2020 0457 by Janes Johnson RN  Outcome: Ongoing    Patient's pain has remained manageable during this shift. See MAR     Problem: Musculor/Skeletal Functional Status  Goal: Highest potential functional level  8/26/2020 0457 by Janes Johnson RN  Outcome: Ongoing     Problem: Musculor/Skeletal Functional Status  Goal: Absence of falls  8/26/2020 0457 by Janes Johnson RN  Outcome: Ongoing     Problem: Musculor/Skeletal Functional Status  Goal: Highest potential functional level  8/26/2020 0457 by Janes Johnson RN  Outcome: Ongoing     Problem: Musculor/Skeletal Functional Status  Goal: Absence of falls  8/26/2020 0457 by Janes Johnson RN  Outcome: Ongoing     Problem: Falls - Risk of:  Goal: Will remain free from falls  Description: Will remain free from falls  8/26/2020 0457 by Janes Johnson RN  Outcome: Ongoing     Problem: Falls - Risk of:  Goal: Absence of physical injury  Description: Absence of physical injury  8/26/2020 0457 by Janes Johnson RN  Outcome: Ongoing     Patient has remained free from falls and physical injury during this shift. Bed in lowest position, patient is educated to call for help when needed.

## 2020-08-26 NOTE — PROGRESS NOTES
Physical Therapy  Facility/Department: [facility] MED SURG  Daily Treatment Note  NAME: Marcel Lopez  : 1965  MRN: [id number]    Date of Service: 2020    Discharge Recommendations: The patient would benefit from additional Physical  Therapy after discharge from the facility upon return to their Home. PT Equipment Recommendations  Equipment Needed: Yes  Mobility Devices: Jadleen Pinto: Rolling    Assessment   Body structures, Functions, Activity limitations: Decreased functional mobility ; Decreased ADL status; Decreased ROM; Decreased strength;Decreased endurance;Decreased balance; Increased pain  Treatment Diagnosis: Impaired functional mobility 2* R knee OA  History: R TKR by Dr Charmayne Sender 20  Barriers to Learning: none  REQUIRES PT FOLLOW UP: Yes  Activity Tolerance  Activity Tolerance: Patient Tolerated treatment well     Patient Diagnosis(es): There were no encounter diagnoses. has a past medical history of Acid indigestion, Anesthesia, Cancer ([address] Chiari I malformation ([address] Chronic back pain, Difficult intubation, Elevated liver enzymes, Fibromyalgia, GERD (gastroesophageal reflux disease), Headache, Hepatic steatosis, [place] (hard of hearing), Kidney stones, Liver disease, Obesity, Osteoarthritis, PONV (postoperative nausea and vomiting), Seizures ([address] Thyroid disease, Urinary incontinence, UTI (urinary tract infection), Von Willebrand disease ([address] and Wears glasses. has a past surgical history that includes Hysterectomy; brain surgery; shoulder surgery (Right); Cholecystectomy; back surgery; Dilation and curettage of uterus; Carpal tunnel release (Right); Knee arthroscopy (Bilateral); Colonoscopy; Appendectomy; pr knee scope,diagnostic (Bilateral, 2017); and Total knee arthroplasty (Right, 2020).     Restrictions  Restrictions/Precautions  Restrictions/Precautions: Fall Risk, General Precautions, Surgical Protocols, Weight Bearing(WBAT RLE)  Required Braces or Orthoses?: No  Implants present? : Metal implants(R TKA 8/24/20; plate in skull; steel iva in back)  Lower Extremity Weight Bearing Restrictions  Right Lower Extremity Weight Bearing: Weight Bearing As Tolerated  Position Activity Restriction  Other position/activity restrictions: PT eval and treat  Subjective   General  Chart Reviewed: Yes  Additional Pertinent Hx: Von Willebrand disease, PONV, seizures, fibromyalgia, chiari I Malformation  Family / Caregiver Present: No  Referring Practitioner: Dr Jose Alfredo Denny  Subjective  Subjective: Patient pleasant and eager to return home. Pain Screening  Patient Currently in Pain: Yes  Pain Assessment  Pain Assessment: 0-10  Pain Level: 8  Pain Type: Surgical pain  Pain Location: Knee  Pain Orientation: Right  Pain Descriptors: Throbbing(\"Like someone is taking a hammer to the inside of my knee and scratching\")  Vital Signs  Patient Currently in Pain: Yes       Orientation  Orientation  Overall Orientation Status: Within Functional Limits  Objective   Transfers  Sit to Stand: Stand by assistance  Stand to sit: Stand by assistance  Comment: Cues for safety, stands up without RW. Ambulation  Ambulation?: Yes  WB Status: Full WBAT R LE  Ambulation 1  Surface: level tile  Device: Rolling Walker  Assistance: Stand by assistance  Quality of Gait: slow blayne, slight forward flexed trunk/hips, no LOB, pt steady  Distance: 100 ft x2 ft; 15 ft x2  Comments: Cues for posture and to stay within RW base of support. Cues to \"keep toes straight\" and heel strike. Stairs/Curb  Stairs?: Yes  Stairs  # Steps : 5  Stairs Height: (4\"/6\")  Device: No Device  Assistance: Contact guard assistance  Comment: education for sequencing at start with occasional cues throughout negotiation. No LOB noted. Cues to flex right knee to advance to step vs circumduct.      Other exercises  Other exercises?: No(Patient requesting to eat cereal prior to  arrives for discharge, refuses exercises at this time)      Other Activities: (Toileting)    Goals  Short term goals  Time Frame for Short term goals: 1-2 days  Short term goal 1: Pt to demonstrate Mod I bed mobility. Short term goal 2: Pt to demonstrate supervision with transfers. Short term goal 3: Pt to amb 100'-150' with supervision and device. Short term goal 4: Pt to ascend/descend 3 stairs per home entry set up, SBA/CGA with family training if present. Short term goal 5: Pt to improve R knee AROM 0-90 to improve independence and ease of mobility. Short term goal 6: Pt to demonstrate improved B LE strength with good technique of HEP. Patient Goals   Patient goals : To go home    Plan    Plan  Times per week: BID  Specific instructions for Next Treatment: progress gait/stairs, family training if available, monitor O2 sats, Knee AROM, HEP  Current Treatment Recommendations: Strengthening, ROM, Balance Training, Functional Mobility Training, Transfer Training, Endurance Training, Gait Training, Stair training, Equipment Evaluation, Education, & procurement, Patient/Caregiver Education & Training, Safety Education & Training, Home Exercise Program, Positioning  Safety Devices  Type of devices:  All fall risk precautions in place, Call light within reach, Gait belt, Patient at risk for falls, Left in chair, Nurse notified  Restraints  Initially in place: No     Therapy Time   Individual Concurrent Group Co-treatment   Time In [facility]         Time Out 0951         Minutes [address] [place], [place]

## 2020-08-26 NOTE — PROGRESS NOTES
right Knee, Arthoplasty  Pain is well controlled. She has no nausea and no vomiting.     Current activity is up with assistance        Plan:      1:  Continue Physical Therapy  2:  Continue Deep venous thrombosis prophylaxis  3:  Continue Pain Control  4:  Discharge plans home       Electronically signed by Michoacano Roberts MD on 8/26/2020 at 12:56 PM

## 2020-08-26 NOTE — PROGRESS NOTES
[address]    INPATIENT OCCUPATIONAL THERAPY  PROGRESS NOTE  Date: 2020  Patient Name: Tere Craft      Room:   MRN: [id number]    : 1965  (49 y.o.) Gender: female     Discharge Recommendations:  Further Occupational  Therapy is recommended upon facility discharge. Referring Practitioner: Dr. Eldon Alfaro  Diagnosis: R TKA 20  General  Chart Reviewed: Yes, Orders, Progress Notes, History and Physical, Operative Notes  Patient assessed for rehabilitation services?: Yes  Family / Caregiver Present: Yes  Referring Practitioner: Dr. Eldon Alfaro  Diagnosis: R TKA 20    Restrictions  Restrictions/Precautions: Fall Risk, General Precautions, Surgical Protocols, Weight Bearing(WBAT RLE)  Implants present? : Metal implants(R TKA 20; plate in skull; steel iva in back)  Other position/activity restrictions: PT eval and treat  Right Lower Extremity Weight Bearing: Weight Bearing As Tolerated  Required Braces or Orthoses?: No      Subjective  Subjective: Pt reports her pain has improved. Comments: Pt is present for IV meds post surgery due to bleeding disorder. This date she is demoing improved rolling walker safety this date as well as improved stand tolerance. She is anticipating. Patient Currently in Pain: Yes  Pain Level: 8  Pain Location: Knee  Pain Orientation: Right  Overall Orientation Status: Within Functional Limits     Pain Assessment  Pain Assessment: 0-10  Pain Level: 8  Pain Type: Surgical pain  Pain Location: Knee  Pain Orientation: Right  Pain Descriptors: Throbbing  Clinical Progression: Not changed  Response to Pain Intervention: Patient Satisfied    Objective  Cognition  Overall Cognitive Status: WFL  Attention Span: Attends with cues to redirect  Memory: Decreased short term memory  Following Commands:  Follows multistep commands with repetition  Safety Judgement: Decreased awareness of need for safety  Awareness of Errors: Good awareness of errors would benefit from continued therapy after discharge  Activity Tolerance: Patient Tolerated treatment well  Safety Devices in place: Yes  Type of devices: Call light within reach;Gait belt;Patient at risk for falls; Left in chair;Nurse notified             Patient Education:   Total Joint Program  Learner:patient  Method: demonstration and explanation       Outcome: needs reinforcement     Plan  Safety Devices  Safety Devices in place: Yes  Type of devices: Call light within reach, Gait belt, Patient at risk for falls, Left in chair, Nurse notified  Plan  Times per week: 4-6 (BID)  Times per day: Twice a day  Current Treatment Recommendations: Self-Care / ADL, Home Management Training, Strengthening, Balance Training, Functional Mobility Training, Endurance Training, Pain Management, Safety Education & Training, Patient/Caregiver Education & Training, Equipment Evaluation, Education, & procurement      Goals  Short term goals  Time Frame for Short term goals: By discharge  Short term goal 1: Pt will verbalize/demonstrate Good understanding of assistive equipment/durable medical equipment/modified techniques for increased independence with self-care and mobility. Short term goal 2: Pt will verbalize/demonstrate Good understanding of Total Joint Program - R knee. Short term goal 3: Pt will actively participate in Total Joint Program - R knee. Short term goal 4: Pt will actively participate in 30+ minutes of therapeutic exercise/functional activities to promote increased independence with self-care and mobility.     OT Individual Minutes  Time In: 6264  Time Out: 4170  Minutes: 55      Electronically signed by ROMAN Garay on 8/26/20 at 4:01 PM EDT

## 2020-08-26 NOTE — FLOWSHEET NOTE
Magdalena talked about healing; death of her Mom and young daughter years ago  [de-identified] for help her son received from former  at [employer]   She is very grateful for [employer] and pastoral/listening presence today      08/26/20 1345   Encounter Summary   Services provided to: Patient   Referral/Consult From: Jigar;Patient   Support System Spouse; Children;Family members; Yazidism/reginaldo community;Friends/neighbors   Place of [facility]  Dariel Garcia   Continue Visiting   (8/26/20)   Complexity of Encounter Moderate   Length of Encounter 30 minutes   Spiritual Assessment Completed Yes   Routine   Type Post-procedure   Intervention [place]   Spiritual/Catholic   Type Spiritual support   Assessment Hopeful;Coping; Approachable   Intervention Active listening;Explored feelings, thoughts, concerns;Explored coping resources; Discussed relationship with God;Discussed meaning/purpose;Nurtured hope;Prayer;Scripture;Ritual;Communion   Outcome Shared life review; Shared reminiscences; Receptive; Hopeful;Encouraged;Engaged in conversation;Expressed gratitude;Comfort   Sacraments   Communion Patient received communion

## 2020-08-26 NOTE — CARE COORDINATION
After Visit Summary   Magdalena Almanza  MRN: [id number]      Primary osteoarthritis of right knee    8/24/2020 - 8/26/2020   [address] [facility]   [phone]    Care Plan Once You Return Home     Do         these medications from [facility] 12[address] [facility] 28, [address] [phone] Jose Delcid [phone]    diazePAM      Go to King Ruiz MD on 9/9/2020    [address]   [address] Aaron Ferrer [address] 8:35 AM   King Ruiz MD   [address] [facility] 72   9491 [facility]   [address]   [phone]    You have more future appointments. Please review your full appointment list.   After Visit Summary   Instructions        Need Help? After Visit Summary  (Discharge Instructions)     This summary was created for you.     Thank you for entrusting your care to us. The following information includes details about your hospital/visit stay along with steps you should take to help with your recovery once you leave the hospital. Follow-up with your Primary Care Provider when condition worsens. In the event of an emergency, call 911 or go to the nearest Emergency Department. At your follow-up appointment, ask your physician about any tests or studies that were not available at discharge. In this packet, you will find information about the topics listed below:      Instructions about your medications including a list of your home medications   A summary of your hospital visit   Follow-up appointments once you have left the hospital   Your care plan at home        You may receive a survey regarding the care you received during your stay. Your input is valuable to us. We encourage you to complete and return your survey in the envelope provided.    We hope you will choose us in the future for your healthcare needs.        Your medications have changed     START taking:    diazePAM (VALIUM)    Review your updated medication list below. Care Plan Once You Return Home   Do   Go             Your Visit     Here you will find information about your visit, including the reason for your visit. Please take this sheet with you when you visit your doctor or other health care provider in the future. It will help determine the best possible medical care for you at that time. If you have any questions once you leave the hospital, please call the department phone number listed below. In the event of an emergency, call 911 or go to the nearest Emergency Department. Preventive Care      Date Due    Eye Exam By An Eye Doctor  10/12/1976    Urine Check For Kidney Problems  10/12/1984    Hepatitis B vaccine (1 of 3 - Risk 3-dose series)  10/12/1985    Shingles Vaccine (1 of 2)  10/12/2016    Mammograms are recommended every 2 years for low/average risk patients and every year for high risk patients per updated national guidelines. However, these guidelines can be individualized by your provider.   03/15/2020    Pap Smear  01/01/2030 (Originally 10/12/1987)    Yearly Flu Vaccine (1)  09/01/2020    Diabetic Foot Exam  11/01/2020    Medicare Annual Wellness Visit  04/24/2021    Potassium monitoring  06/17/2021    Creatinine monitoring  06/17/2021    Hemoglobin A1C (Test For Long-Term Glucose Control)  06/26/2021    Cholesterol Screening  06/26/2021    TSH testing  06/26/2021    Tetanus Combination Vaccine (4 - Td)  02/16/2025    Colonoscopy  08/25/2027            Follow Up Information and Future Appointments     Follow Up Information and Future Appointments           Follow up with Papito Moise MD   Specialty: [facility]   [facility]   [address] [place] [address]   [phone]            Follow up with [facility]   Specialty: [address]  [facility] [address]   [phone]    Sep9 Postop with Rosemary Cantu MD   Wednesday Sep 9, 2020 8:35 AM  [address] [place] [address] Kayla Andrew 75   [address]            Go to Elsy Early MD   Wednesday Sep 9, 2020   Specialty: [address] Follow up, Appointment Time at 8:35a.m., If you are unable to keep appt please call to reschedule. [address] [employer]   [address] Office Visit with Tan Menjivar MD   Friday Dec 18, 2020 9:40 AM   Please arrive 15 minutes prior to appointment, bring photo ID and insurance card. [facility] 59   559 [facility] 91   [phone]    You are allergic to the following     You are allergic to the following   Allergen  Reactions    Adhesive Tape  Not Noted        Ddavp (Desmopressin Acetate)  Other (See Comments)    headaches        Meperidine  Itching        Morphine  Other (See Comments)        Nubain  (Nalbuphine Hcl)  Other (See Comments)    Nubain        Propoxyphene  Not Noted        Tramadol  Not Noted        Tylenol (Acetaminophen)  Other (See Comments)    Can not take due to liver issues    Your Latest Vitals        Blood Pressure 123/73      BMI 40.20      Weight 219 lb 12.8 oz      Height 5' 2\"      Temperature (Oral) 97.6 °F      Pulse 108      Respiration 16      Oxygen Saturation 97%      BSA 2.09 m²   Opioid Education     Prescription Opioids: What You Need to Know:     Prescription opioids can be used to help relieve moderate-to-severe pain and are often prescribed following a surgery or injury, or for certain health conditions. These medications can be an important part of treatment but also come with serious risks. Opioids are strong pain medicines. Examples include hydrocodone ([place] Embs), oxycodone (ROXICODONE, OXYCONTIN, PERCOCET), fentanyl ([address]), and morphine (MS CONTIN). Heroin is an example of an illegal opioid.   It is important to work with your health care provider to make sure you are getting the safest, most effective care.    WHAT ARE THE RISKS AND SIDE EFFECTS OF OPIOID USE? Prescription opioids carry serious risks of addiction and overdose, especially with prolonged use. An opioid overdose, often marked by slow breathing, can cause sudden death. The use of prescription opioids can have a number of side effects as well, even when taken as directed. · Tolerance-meaning you might need to take more of a medication for the same pain relief  · Physical dependence-meaning you have symptoms of withdrawal when the medication is stopped. Withdrawal symptoms can include nausea, sweating, chills, diarrhea, stomach cramps, and muscle aches. Withdrawal can last up to several weeks, depending on which drug you took and how long you took it. · Increased sensitivity to pain  · Constipation  · Nausea, vomiting, and dry mouth  · Sleepiness and dizziness  · Confusion  · Depression  · Low levels of testosterone that can result in lower sex drive, energy, and strength  · Itching and sweating     RISKS ARE GREATER WITH:                                                   · History of drug misuse, substance use disorder, or overdose  · Mental health conditions (such as depression or anxiety)  · Sleep apnea  · Older age (72 years or older)  · Pregnancy     Avoid alcohol while taking prescription opioids. Also, unless specifically advised by your health care provider, medications to avoid include:  · Benzodiazepines (such as alprazolam (Xanax) or diazepam (Valium))  · Muscle relaxants (such as carisoprodol (Soma) or cyclobenzaprine (Flexeril))  · Hypnotics (such as zolpidem (Ambien) or eszopiclone (Lunesta))  · Other prescription opioids     KNOW YOUR OPTIONS  Talk to your health care provider about ways to manage your pain that don't involve prescription opioids. Some of these options may actually work better and have fewer risks and side effects.  Consult your physician before adding or stopping any medications, treatments, or physical activity. Options may include:  · Pain relievers such as acetaminophen (Tylenol), ibuprofen (Motrin, Advil), and naproxen (Naprosyn, Aleve)  · Some medications that are also used for depression or seizures  · Physical therapy and exercise  · Counseling to help patients learn how to cope better with triggers of pain and stress. · Application of heat or cold compress  · Massage therapy  · Relaxation techniques     Be Informed  Make sure you know the name of your medication, how much and how often to take it, and its potential risks & side effects.     IF YOU ARE PRESCRIBED OPIOIDS FOR PAIN:  · Never take opioids in greater amounts or more often than prescribed. Remember the goal is not to be pain-free but to manage your pain at a tolerable level. · Follow up with your primary care provider to:  § Work together to create a plan on how to manage your pain. § Talk about ways to help manage your pain that don't involve prescription opioids. § Talk about any and all concerns and side effects. · Help prevent misuse and abuse. § Never sell or share prescription opioids  § Help prevent misuse and abuse. · Store prescription opioids in a secure place and out of reach of others (this may include visitors, children, friends, and family). · Safely dispose of unused/unwanted prescription opioids: Find your community drug take-back program or your pharmacy mail-back program, or flush them down the toilet, following guidance from the Food and Drug Administration (www.fda.gov/Drugs/ResourcesForYou). · Visit www.cdc.gov/drugoverdose to learn about the risks of opioid abuse and overdose.   · If you believe you may be struggling with addiction, tell your health care provider and ask for guidance or call [employer] at 6-461-106-THYN.                                                               Daily Medication List (This medication list can be shared with any healthcare provider who is helping you manage your medications)     There are NEW medications for you. START taking them after you leave the hospital     There are NEW medications for you. START taking them after you leave the hospital     Next Dose Due  AM  NOON  PM  NIGHT     diazePAM 5 MG tablet   Commonly known as: VALIUM   Take 1 tablet by mouth every 8 hours as needed for Anxiety for up to 30 days.   As needed        You told us you were taking these medications at home,but the amount or how often you take this medication has CHANGED     You told us you were taking these medications at home,but the amount or how often you take this medication has CHANGED     Next Dose Due  AM  NOON  PM  NIGHT     pregabalin 150 MG capsule   Commonly known as: Lyrica   Take 1 capsule by mouth daily   What changed: when to take this         These are medications you told us you were taking at home, CONTINUE taking them after you leave the hospital     These are medications you told us you were taking at home, CONTINUE taking them after you leave the hospital     Next Dose Due  AM  NOON  PM  NIGHT     atorvastatin 20 MG tablet   Commonly known as: LIPITOR   TAKE 1 TABLET BY MOUTH EVERY DAY          Biotin 5 MG Caps   Take 1 capsule by mouth daily          Cholecalciferol 50 MCG (2000 UT) Tabs   Take by mouth          [facility]S Fish Oil 1000 MG Caps   TAKE 2 TABLETS BY MOUTH EVERY DAY          * Handicap Placard Misc   by Does not apply route 5 years, cannot walk over 200 ft.          * Handicap Placard Misc   by Does not apply route 5 years, cannot walk over 200 ft.          hydroCHLOROthiazide 25 MG tablet   Commonly known as: HYDRODIURIL   TAKE 1 TABLET BY MOUTH EVERY DAY          hydrocortisone 1 % cream   Apply topically TID for up to 2 weeks          ibuprofen 800 MG tablet   Commonly known as: ADVIL;MOTRIN   Take 1 tablet by mouth every 8 hours as needed for Pain  As needed         Insulin Pen Needle 31G X 8 MM Misc   Commonly known as: B-D ULTRAFINE III SHORT PEN   Inject 1 each into the skin daily May substitute with any brand          Klor-Con M20 20 MEQ extended release tablet   Generic drug: potassium chloride   TAKE 2 TABLETS BY MOUTH EVERY DAY          levothyroxine 50 MCG tablet   Commonly known as: SYNTHROID   Take 50 mcg by mouth Daily          lidocaine viscous hcl 2 % Soln solution   Commonly known as: XYLOCAINE   Apply with a swab to mouth lesions every hour as needed for pain          liothyronine 5 MCG tablet   Commonly known as: CYTOMEL   TAKE 1 TABLET BY MOUTH TWICE A DAY          metFORMIN (OSM) 1000 MG extended release tablet   Commonly known as: FORTAMET   TAKE 1 TABLET BY MOUTH EVERY DAY          nortriptyline 50 MG capsule   Commonly known as: PAMELOR   TAKE 3 CAPSULES BY MOUTH AT BEDTIME          omeprazole 40 MG delayed release capsule   Commonly known as: PRILOSEC   TAKE 1 CAPSULE BY MOUTH EVERY DAY          * oxyCODONE HCl 10 MG immediate release tablet   Commonly known as: OXY-IR   Take 10 mg by mouth daily as needed for Pain. As needed         * oxyCODONE 30 MG T12a extended release tablet   Commonly known as: OXYCONTIN   Take 1 tablet by mouth every 12 hours. predniSONE 5 MG tablet   Commonly known as: DELTASONE   TAKE 1/2 TABLET BY MOUTH DAILY          spironolactone 50 MG tablet   Commonly known as: ALDACTONE   TAKE 1 TABLET BY MOUTH EVERY DAY          therapeutic multivitamin-minerals tablet   Take 1 tablet by mouth daily          tiZANidine 4 MG tablet   Commonly known as: ZANAFLEX   TAKE 1 TABLET BY MOUTH AT BEDTIME          triamcinolone 0.025 % cream   Commonly known as: KENALOG   Apply topically 2 times daily.           valACYclovir 500 MG tablet   Commonly known as: VALTREX   TAKE 1 TABLET BY MOUTH DAILY AS NEEDED (LESIONS)          Victoza 18 MG/3ML Sopn SC injection   Generic drug: Liraglutide   INJECT 1.2 MG UNDER THE SKIN ONCE DAILY          vitamin B-12 500 MCG tablet   Commonly known as: CYANOCOBALAMIN   Take 500 mcg by mouth daily         (very important)   * This list has 4 medication(s) that are the same as other medications prescribed for you. Read the directions carefully, and ask your doctor or other care provider to review them with you.            Where to  your medications         these medications at [facility] [address] [place] [address] [phone] Bernice Habermann [phone]      diazePAM   Address:  [place] [id number] [facility] Colt Martinez, [address] [facility] [address]    Phone:  [phone]    Important information for a smoker        THE MOST IMPORTANT ACTION YOU CAN TAKE TO IMPROVE YOUR CURRENT AND FUTURE HEALTH IS TO QUIT SMOKING.     Call the National Quit Smoking Support [address] [employer] [facility] [place] NOW (072-1837)     Smoking harms nonsmokers. When nonsmokers are around people who smoke, they absorb nicotine, carbon monoxide, and other ingredients of tobacco smoke.      DO NOT SMOKE AROUND CHILDREN     Children exposed to secondhand smoke are at an increased risk of:  Sudden Infant Death Syndrome (SIDS), acute respiratory infections, inflammation of the middle ear, and severe asthma. Over a longer time, it causes heart disease and lung cancer. There is no safe level of exposure to secondhand smoke.          Contraceptive Medication Interaction                                    Birth Control Drug Interaction with Sugammadex (Bridion®)      During your procedure or surgery you received Sugammadex (Bridion®) that lowers the effectiveness of birth control medications. You need to be aware of this if you are on any type of a hormonal contraceptive (birth control medication). Sugammadex (Bridion®) is a medicine that helps to speed up recovery from anesthesia (muscle relaxant) drugs patients receive during surgery. Sugammadex may decrease the effectiveness of your hormonal contraceptive (birth control) for up to 7 days.   · Use a backup birth control method for up to 7 days after your procedure or surgery  · Continue taking your hormonal contraceptive during this period                        JAVIER Instructions     Continuity of Care Form     Patient Name: Leonarda Ortiz   :  1965                  MRN:  [id number]     Admit date:  2020                     Discharge date:  20     Code Status Order: Full Code   Advance Directives:              Advance Care Flowsheet Documentation      Date/Time Healthcare Directive Type of Healthcare Directive Copy in [address] Agent's Name Healthcare Agent's Phone Number     [phone]  Yes, patient has an advance directive for healthcare treatment --  Yes, copy in chart -- -- --             Admitting Physician:  Truong Lynn MD  PCP: Genesis Vasques MD     Discharging Nurse: [facility] Unit/Room#: 43 [facility]  Discharging Unit Phone Number: [phone]     Emergency Contact:   Extended Emergency Contact Information  Primary Emergency Contact: MachadoBhavin  Address: [address] [place] [address], [address] [place] [address] Phone: [phone]  Relation: Spouse  Secondary Emergency Contact: HakeemKirti  [place] Phone: [phone]  Relation: Brother/Sister     Past Surgical History:        Past Surgical History:   Procedure Laterality Date    APPENDECTOMY        BACK SURGERY         lumbar surgery    BRAIN SURGERY        CARPAL TUNNEL RELEASE Right      CHOLECYSTECTOMY        COLONOSCOPY        DILATION AND CURETTAGE OF UTERUS         few times    HYSTERECTOMY        KNEE ARTHROSCOPY Bilateral      MI KNEE SCOPE,DIAGNOSTIC Bilateral 2017     KNEE ARTHROSCOPY RIGHT WITH PARTIAL MEDIAL MENISECTOMY, SUBTOTAL LATERAL MENISECTOMY, AND DEBRIDEMENT OF ACL /  NEEDLE ASPIRATION LEFT KNEE performed by Truong Lynn MD at [address] Right           Immunization History:        Immunization History   Administered Date(s) Administered    Influenza Virus Vaccine 10/16/2015    Influenza, Ora Kip, IM, (6 mo and older Fluzone, Flulaval, Fluarix and 3 yrs and older Afluria) 12/02/2016, 09/05/2017, 08/31/2018    Influenza, Ora Kip, IM, PF (6 mo and older Fluzone, Flulaval, Fluarix, and 3 yrs and older Afluria) 11/01/2019    Pneumococcal Polysaccharide ([id number]) 05/19/2017, 02/16/2018    Td (Adult), 2 Lf Tetanus Toxoid, Pf (Td, Absorbed) 01/31/2013    Tdap (Boostrix, Adacel) 01/01/2012, 02/16/2015         Active Problems:       Patient Active Problem List   Diagnosis Code    Von Willebrand's disease ([facility] D68.0    Hypothyroid E03.9    Arnold-Chiari syndrome without spina bifida or hydrocephalus ([facility] Q07.00    Attention deficit disorder (ADD) without hyperactivity F98.8    Hepatocellular damage K76.9    Hypokalemia E87.6    Elevated liver enzymes R74.8    Chiari I malformation (HCC) G93.5    Osteoarthritis M19.90    Liver disease K76.9    Chronic back pain M54.9, G89.29    Fibromyalgia M79.7    Headache R51    Urinary incontinence R32    Obesity E66.9    Hepatic steatosis K76.0    Gastroesophageal reflux disease without esophagitis K21.9    Hyperglycemia R73.9    Acute gout of knee M10.9    Von Willebrand disease ([facility] D68.0    Polymyalgia rheumatica ([facility] M35.3    Vitamin D deficiency E55.9    BMI 38.0-38.9,adult Z68.38    Primary osteoarthritis of right knee M17.11         Isolation/Infection:       Isolation            No Isolation                      Patient Infection Status      Infection Onset Added Last Indicated Last Indicated By Review Planned Expiration Resolved Resolved By     MRSA 06/17/20 06/17/20 06/17/20 MRSA DNA Probe, Nasal             Resolved     COVID-19 Rule Out 08/20/20 08/21/20 08/20/20 Covid-19 Ambulatory (Ordered)     08/22/20 Rule-Out Test Resulted     COVID-19 Rule Out 06/26/20 06/26/20 06/26/20 COVID-19 (Ordered)     06/28/20 Rule-Out Test Resulted             Nurse Assessment:  Last Vital Signs: /67   Pulse 85   Temp 97.7 °F (36.5 °C)    Discharging to Facility/ . [address] [facility] [address]  Phone [phone]  Fax  [phone]        / signature: Electronically signed by Cherylene Patten, RN on 8/24/20 at 5:25 PM EDT     PHYSICIAN SECTION     Prognosis: Good     Condition at Discharge: Stable     Rehab Potential (if transferring to Rehab): Good     Recommended Labs or Other Treatments After Discharge:      Physician Certification: I certify the above information and transfer of Divya Stroud  is necessary for the continuing treatment of the diagnosis listed and that she requires Home Care for greater 30 days.      Update Admission H&P: No change in H&P     PHYSICIAN SIGNATURE:  Electronically signed by Keely Del Cid MD on 8/24/20 at 7:35 AM EDT

## 2020-08-26 NOTE — CARE COORDINATION
ONGOING DISCHARGE PLAN:    Reviewed patients chart regarding discharge plan and chart still confirms the plan is to discharge to home with vns Alin   Patient will discharge to home today after last infusion of Humate    Will review plan with patient and or family      Will continue to follow for additional discharge needs.      Electronically signed by Stephanie Chandler RN on 8/26/2020 at 1:44 PM

## 2020-08-27 NOTE — DISCHARGE SUMMARY
Discharge Summary     Patient ID:  Kelsey Combs  [id number]  73 y.o.  1965    Admit date: 8/24/2020    Discharge date and time: 8/26/2020  5:29 PM     Admitting Physician: Bebeto Jewell MD     Admission Diagnoses: Primary osteoarthritis of right knee [M17.11]    Discharge Diagnoses: same    Admission Condition: good    Discharged Condition: good    Indication for Admission: Right TKA    Hospital Course: no compications. Pateint required extended stay as she has h/o Von Willebrand's disease and heme advised Humate infusions post operatively for prophylaxis    Consults: none    Treatments: surgery and PT    Disposition: home    Patient Instructions:   Discharge Medication List as of 8/26/2020  1:33 PM      CONTINUE these medications which have NOT CHANGED    Details   diazePAM (VALIUM) 5 MG tablet Take 1 tablet by mouth every 8 hours as needed for Anxiety for up to 30 days. , Disp-90 tablet,R-0Normal      oxyCODONE HCl (OXY-IR) 10 MG immediate release tablet Take 10 mg by mouth daily as needed for Pain. Historical Med      !! Handicap Placard [facility] Starting Thu 7/30/2020, Disp-1 each,R-0, Print5 years, cannot walk over 200 ft.      !! Handicap [facility] Starting Thu 7/30/2020, Disp-1 each,R-0, Print5 years, cannot walk over 200 ft.      ibuprofen (ADVIL;MOTRIN) 800 MG tablet Take 1 tablet by mouth every 8 hours as needed for Pain, Disp-90 tablet, R-11Normal      spironolactone (ALDACTONE) 50 MG tablet TAKE 1 TABLET BY MOUTH EVERY DAY, Disp-90 tablet, R-3Normal      atorvastatin (LIPITOR) 20 MG tablet TAKE 1 TABLET BY MOUTH EVERY DAY, Disp-30 tablet, R-11Normal      omeprazole (PRILOSEC) 40 MG delayed release capsule TAKE 1 CAPSULE BY MOUTH EVERY DAY, Disp-90 capsule, R-3Normal      Omega-3 Fatty Acids ([employer]S FISH OIL) 1000 MG CAPS TAKE 2 TABLETS BY MOUTH EVERY DAYHistorical Med      liothyronine (CYTOMEL) 5 MCG tablet TAKE 1 TABLET BY MOUTH TWICE A DAYHistorical Med      KLOR-CON M20 20 MEQ extended release discuss with provider. Activity: activity as tolerated  Diet: regular diet  Wound Care: keep wound clean and dry    Follow-up with Dr Wing Rasheed in 2 weeks.     Electronically signed by Davion Molina MD on 8/27/2020 at 10:43 AM

## 2020-09-04 ENCOUNTER — OFFICE VISIT (OUTPATIENT)
Dept: ORTHOPEDIC SURGERY | Age: 55
End: 2020-09-04

## 2020-09-04 VITALS — TEMPERATURE: 97.2 F

## 2020-09-04 PROCEDURE — 99024 POSTOP FOLLOW-UP VISIT: CPT | Performed by: PHYSICIAN ASSISTANT

## 2020-09-04 RX ORDER — CEFDINIR 300 MG/1
300 CAPSULE ORAL 2 TIMES DAILY
Qty: 20 CAPSULE | Refills: 0 | Status: SHIPPED | OUTPATIENT
Start: 2020-09-04 | End: 2020-09-14

## 2020-09-04 RX ORDER — OXYCODONE HYDROCHLORIDE 5 MG/1
5 TABLET ORAL EVERY 6 HOURS PRN
Qty: 20 TABLET | Refills: 0 | Status: SHIPPED | OUTPATIENT
Start: 2020-09-04 | End: 2020-09-09

## 2020-09-04 ASSESSMENT — ENCOUNTER SYMPTOMS
COUGH: 0
VOMITING: 0
RHINORRHEA: 0
COLOR CHANGE: 0
EYES NEGATIVE: 1
NAUSEA: 0

## 2020-09-04 NOTE — PROGRESS NOTES
Patient ID: Marcela Szymanski is a 49 y.o. female. Chief Complaint   Patient presents with   Luis Mant knee TKa 8/24/2020        HPI  Ms. Lexx Rose is a 81-year-old female presents for postoperative evaluation status post a 2420 right total knee arthroplasty. Patient states initially she was doing very well postoperatively however approximately 4 to 5 days ago she noticed some redness to the distal portion of the incision and lower leg. She states 2 days ago that this area became quite painful and the redness seems to be spreading. She states she has minimal pain at the knee itself and seems to be doing well with physical therapy in that regard however this pain to the lower leg and shin making it quite difficult to get around. She is able to ambulate well with the assistance of a walker. Patient denies any drainage from the incision itself. She denies any fever, chills, nausea or vomiting. She is not currently taking any medication for pain control at home.     Past Medical History:   Diagnosis Date    Acid indigestion     hx of    Anesthesia      difficult intubation due to cranio cervical fusion (intubation by fiber optic of glidescope (Dr. Almazan Distance [phone] orthopedic spinal surgion.)     Cancer [facility]) 1987    cervical    Chiari I malformation ([address]     Chronic back pain     Difficult intubation     hx. of cranio-cervical fusion , intubation by fiber optic glidescopy    Elevated liver enzymes 09/2016    Fibromyalgia     GERD (gastroesophageal reflux disease)     Headache     Hepatic steatosis     [place] (hard of hearing[place]     noel. ears    Kidney stones     Liver disease     Obesity     Osteoarthritis     PONV (postoperative nausea and vomiting)     Seizures ([address]     non epileptic, last     Thyroid disease     hypothyroid    Urinary incontinence     UTI (urinary tract infection)     Von Willebrand disease ([address]     Wears glasses      Past Surgical History:   Procedure Laterality Date    APPENDECTOMY      BACK SURGERY      lumbar surgery    BRAIN SURGERY      CARPAL TUNNEL RELEASE Right     CHOLECYSTECTOMY      COLONOSCOPY      DILATION AND CURETTAGE OF UTERUS      few times    HYSTERECTOMY      KNEE ARTHROSCOPY Bilateral     [place] KNEE SCOPE,DIAGNOSTIC Bilateral 11/2/2017    KNEE ARTHROSCOPY RIGHT WITH PARTIAL MEDIAL MENISECTOMY, SUBTOTAL LATERAL MENISECTOMY, AND DEBRIDEMENT OF ACL /  NEEDLE ASPIRATION LEFT KNEE performed by Ruby Cruz MD at [address] Right     TOTAL KNEE ARTHROPLASTY Right 8/24/2020    KNEE TOTAL ARTHROPLASTY performed by Ruby Cruz MD at [address]     Family History   Problem Relation Age of Onset    Kidney Disease Mother    Vlad Mcelroy Breast Cancer Mother         breast    Kidney Disease Sister     Breast Cancer Sister         breast    Kidney Disease Brother     Breast Cancer Maternal Grandmother         breast    Breast Cancer Paternal Grandmother         breast     Social History     Occupational History    Not on file   Tobacco Use    Smoking status: Current Every Day Smoker     Packs/day: 0.25     Years: 20.00     Pack years: 5.00    Smokeless tobacco: Never Used   Substance and Sexual Activity    Alcohol use: No     Alcohol/week: 0.0 standard drinks    Drug use: No    Sexual activity: Not on file        Review of Systems   Constitutional: Negative for chills and fever. HENT: Negative for congestion and rhinorrhea. Eyes: Negative. Respiratory: Negative for cough. Cardiovascular: Negative for chest pain and leg swelling. Gastrointestinal: Negative for nausea and vomiting. Musculoskeletal: Positive for arthralgias (Right knee s/p Right TKA). Skin: Negative for color change and rash. Neurological: Negative for dizziness and numbness. Physical Exam  Constitutional:       Appearance: She is well-developed. HENT:      Head: Normocephalic and atraumatic.    Neck: Musculoskeletal: Normal range of motion and neck supple. Cardiovascular:      Rate and Rhythm: Normal rate. Pulmonary:      Effort: Pulmonary effort is normal.   Abdominal:      Palpations: Abdomen is soft. Musculoskeletal:      Comments: Right knee:  Gait:  Antalgic. Sutures:  Zipline closure device remains in place. .  Incision: Dressing is removed to visualize the most distal 3 cm of the incision. No significant drainage or dehiscence. There does appear to be approximately 3 cm area of erythema just distal to the  incision. The incision itself at this area does not appear to have erythema and there is no evidence of drainage from the wound throughout. Tenderness: Focal tenderness to the small area of erythema distal to the incision. No tenderness to the knee itself. Flexion ROM:  70  Extension ROM:  5  Effusion:  mild  DVT Evaluation:  No evidence of DVT seen on physical exam.   Skin:     General: Skin is warm and dry. Findings: No rash. Neurological:      Mental Status: She is alert and oriented to person, place, and time. Psychiatric:         Behavior: Behavior normal.         Assessment:     Encounter Diagnoses   Name Primary?  Status post total right knee replacement Yes    Cellulitis of right leg              Plan:     Ms. Clinton Rodriguez is a 69-year-old female who is status post right total neuroplasty 8/24/2020. She developed redness distal to the incision which has progressively worsened over the last 5 days. Incision itself looks excellent and is well approximated with no significant drainage however the erythema is concerning for possible superficial cellulitis. She will be started prophylactically on Omnicef 300 mg twice daily.     -Oxycodone 5 mg was also sent for pain control to be taken every 6 hours  -Continue with previously scheduled follow-up appointment on Wednesday with Dr. Mary Kay Blanco for removal of dressing and closure device  -Patient and  are educated on concerning symptoms to be aware of including spreading erythema, worsening pain, opening of the wound and drainage from the wound. Should any of these develop she should contact our office immediately or be evaluated in the ED. Otherwise we will see her back on Wednesday. No orders of the defined types were placed in this encounter. Avis Officer, [place]    Please note that this chart was generated using voicerecognition Dragon dictation software. Although every effort was made to ensurethe accuracy of this automated transcription, some errors in transcription may haveoccurred.

## 2020-09-09 ENCOUNTER — OFFICE VISIT (OUTPATIENT)
Dept: ORTHOPEDIC SURGERY | Age: 55
End: 2020-09-09

## 2020-09-09 PROCEDURE — 99024 POSTOP FOLLOW-UP VISIT: CPT | Performed by: ORTHOPAEDIC SURGERY

## 2020-09-09 RX ORDER — OXYCODONE HYDROCHLORIDE AND ACETAMINOPHEN 5; 325 MG/1; MG/1
1 TABLET ORAL EVERY 6 HOURS PRN
Qty: 28 TABLET | Refills: 0 | Status: SHIPPED | OUTPATIENT
Start: 2020-09-09 | End: 2020-09-16

## 2020-09-09 NOTE — PROGRESS NOTES
Connor Skiff M.D.            [address] [facility], [address] [facility],[address], [facility]           Dept Phone: [phone]           Dept Fax:  [address]           [place]          Dept Phone: [phone]           Dept Fax:  [phone]      Chief Compliant:  Chief Complaint   Patient presents with    Post-Op Check     8/24/20 Rt TKA         History of Present Illness:  Patient returns today status post right TKA times 2 weeks. Patient has no major complaints . Patient was seen by Christine Rodriguez last week for possible early cellulitis but this is fully resolved. Review of Systems   Constitutional: Negative for fever, chills, sweats, recent injury, recent illness  Neurological: Negative for Headaches, numbness, or weakness. Integumentary: Negative for rash, itching, ecchymosis, or wounds. Musculoskeletal: Positive for Post-Op Check (8/24/20 Rt TKA )       Physical Exam:  Constitutional: Patient is oriented to person, place, and time. Patient appears well-developed and well nourished. Musculoskeletal: Normal gait. Motion 0-100 degrees with expected pain with ROM. No Calf tenderness, Negative Karmen's sign. Neurovascular intact. Neurological: Patient is alert and oriented to person, place, and time. Normal strenght. No sensory deficit. Skin: Skin is warm and dry. Incision is healing well without signs of redness or drainage  Nursing note and vitals reviewed. Labs and Imaging:     XR taken today:  Xr Knee Right (1-2 Views)    Result Date: 9/9/2020  X-rays taken today reviewed by me show standing AP of both knees and a lateral the right knee. Patient is status post recent right total knee arthroplasty. Components appear in excellent position of both AP lateral views. No orders of the defined types were placed in this encounter.       Assessment and Plan:  1. Status post total right knee replacement        2 weeks status post right TKA        This is a 49 y.o. female who presents to the clinic today status post right TKA. Continue anticoagulation. Transition to outpatient Pt. Restrictions given. RTO 5-6 weeks. Call if any problems/issues prior to that       Provider Attestation:  Tova Caceres, personally performed the services described in this documentation. All medical record entries made by the scribe were at my direction and in my presence. I have reviewed the chart and discharge instructions and agree that the records reflect my personal performance and is accurate and complete. Eddie Perrin MD. 09/09/20        Please note that this chart was generated using voice recognition Dragon dictation software. Although every effort was made to ensure the accuracy of this automated transcription, some errors in transcription may have occurred.

## 2020-09-16 ENCOUNTER — TELEPHONE (OUTPATIENT)
Dept: ORTHOPEDIC SURGERY | Age: 55
End: 2020-09-16

## 2020-09-16 NOTE — TELEPHONE ENCOUNTER
Patient calling in to leave a message for [facility]. She is stating she is having shin pain since yesterday and it's getting worse (she thinks maybe it's bruised). She has been icing it. She stated it is not hard or any warmth to the area, just pain. She was last seen on 9/9/20 by Dr. Mann Benavidez for [facility] on 8/24/20.

## 2020-09-17 ENCOUNTER — APPOINTMENT (OUTPATIENT)
Dept: GENERAL RADIOLOGY | Age: 55
End: 2020-09-17
Payer: MEDICARE

## 2020-09-17 ENCOUNTER — TELEPHONE (OUTPATIENT)
Dept: ORTHOPEDIC SURGERY | Age: 55
End: 2020-09-17

## 2020-09-17 ENCOUNTER — HOSPITAL ENCOUNTER (EMERGENCY)
Age: 55
Discharge: HOME | End: 2020-09-17
Attending: STUDENT IN AN ORGANIZED HEALTH CARE EDUCATION/TRAINING PROGRAM
Payer: MEDICARE

## 2020-09-17 VITALS
HEIGHT: 63 IN | HEART RATE: 85 BPM | DIASTOLIC BLOOD PRESSURE: 68 MMHG | SYSTOLIC BLOOD PRESSURE: 125 MMHG | BODY MASS INDEX: 34.91 KG/M2 | WEIGHT: 197 LBS | TEMPERATURE: 97.8 F | RESPIRATION RATE: 16 BRPM | OXYGEN SATURATION: 96 %

## 2020-09-17 PROCEDURE — 96372 THER/PROPH/DIAG INJ SC/IM: CPT

## 2020-09-17 PROCEDURE — 93971 EXTREMITY STUDY: CPT

## 2020-09-17 PROCEDURE — 6360000002 HC RX W HCPCS: Performed by: PHYSICIAN ASSISTANT

## 2020-09-17 PROCEDURE — 73562 X-RAY EXAM OF KNEE 3: CPT

## 2020-09-17 PROCEDURE — 99284 EMERGENCY DEPT VISIT MOD MDM: CPT

## 2020-09-17 PROCEDURE — 6370000000 HC RX 637 (ALT 250 FOR IP): Performed by: PHYSICIAN ASSISTANT

## 2020-09-17 RX ORDER — FENTANYL CITRATE 50 UG/ML
25 INJECTION, SOLUTION INTRAMUSCULAR; INTRAVENOUS ONCE
Status: COMPLETED | OUTPATIENT
Start: 2020-09-17 | End: 2020-09-17

## 2020-09-17 RX ORDER — OXYCODONE HYDROCHLORIDE AND ACETAMINOPHEN 5; 325 MG/1; MG/1
1 TABLET ORAL EVERY 8 HOURS PRN
Qty: 10 TABLET | Refills: 0 | Status: SHIPPED | OUTPATIENT
Start: 2020-09-17 | End: 2020-09-21

## 2020-09-17 RX ORDER — FENTANYL CITRATE 50 UG/ML
25 INJECTION, SOLUTION INTRAMUSCULAR; INTRAVENOUS ONCE
Status: DISCONTINUED | OUTPATIENT
Start: 2020-09-17 | End: 2020-09-17

## 2020-09-17 RX ORDER — OXYCODONE HYDROCHLORIDE AND ACETAMINOPHEN 5; 325 MG/1; MG/1
1 TABLET ORAL ONCE
Status: COMPLETED | OUTPATIENT
Start: 2020-09-17 | End: 2020-09-17

## 2020-09-17 RX ADMIN — OXYCODONE HYDROCHLORIDE AND ACETAMINOPHEN 1 TABLET: 5; 325 TABLET ORAL at 18:59

## 2020-09-17 RX ADMIN — FENTANYL CITRATE 25 MCG: 50 INJECTION, SOLUTION INTRAMUSCULAR; INTRAVENOUS at 20:34

## 2020-09-17 ASSESSMENT — PAIN SCALES - GENERAL
PAINLEVEL_OUTOF10: 8
PAINLEVEL_OUTOF10: 8

## 2020-09-17 NOTE — TELEPHONE ENCOUNTER
Patient spouse calling in, patient had home PT today and the therapist believes the patient has a blood clot. Her calf is hard and she is having a lot of pain in it. Patient had RTKA on 8/24/20    Do you want a doppler order?

## 2020-09-17 NOTE — ED NOTES
Pt is brought to this ER by family with c/o diffuse rt knee pain and rt calf pain s/p injury. Pt states she her dog bumping into her while she was on the stairs, and her knee twisted which caused her to go down a couple of steps. Pt denies LOC but reports diffuse rt knee pain. Pt also c/o rt calf pain which started as a result of her injury. Pt bears weight with discomfort. Pt arrives A+O x 4, GCS = 15, PMS x 4 intact, eupneic, and PWD.      Alexi Lezama RN  09/17/20 8903

## 2020-09-17 NOTE — ED PROVIDER NOTES
[address] ED  eMERGENCY dEPARTMENT eNCOUnter      Pt Name: Lake Syed  MRN: [id number]  [place] 1965  Date of evaluation: 9/17/2020  Provider: CANDACE Osullivan    CHIEF COMPLAINT       Chief Complaint   Patient presents with    Knee Pain     Rt knee pain s/p injury    Leg Pain     Rt calf pain           HISTORY OF PRESENT ILLNESS  (Location/Symptom, Timing/Onset, Context/Setting, Quality, Duration, Modifying Factors, Severity.)   Magdalena Almanza is a 49 y.o. female who presents to the emergency department complaining of right knee pain. Patient was at home and recently had total knee replacement. She is currently in physical therapy. She states that her dog bumped her and she twisted her knee and it bent farther than she has been bending in therapy. She developed knee pain and calf pain. She called the orthopedic office who instructed her to come to the emergency room for evaluation for possible DVT. She denies any chest pain shortness of breath abdominal pain nausea vomiting fevers or chills. She does smoke cigarettes    She states that she had Percocet at home from her orthopedic doctor but ran out of these. Nursing Notes were reviewed. REVIEW OF SYSTEMS    (2-9 systems for level 4, 10 or more for level 5)     Review of Systems   Constitutional: Negative. Musculoskeletal: Right knee pain. Except as noted above the remainder of the review of systems was reviewed and negative.        PAST MEDICAL HISTORY         Diagnosis Date    Acid indigestion     hx of    Anesthesia      difficult intubation due to cranio cervical fusion (intubation by fiber optic of glidescope (Dr. Conrad Montes [phone] orthopedic spinal surgion.)     Cancer [facility]) 1987    cervical    Chiari I malformation ([facility]     Chronic back pain     Difficult intubation     hx. of cranio-cervical fusion , intubation by fiber optic glidescopy    Elevated liver enzymes 09/2016    Fibromyalgia     GERD (gastroesophageal reflux disease)     Headache     Hepatic steatosis     [place]hard [place] hearing[place]     noel. ears    Kidney stones     Liver disease     Obesity     Osteoarthritis     PONV (postoperative nausea and vomiting)     Seizures (HCC)     non epileptic, last     Thyroid disease     hypothyroid    Urinary incontinence     UTI (urinary tract infection)     Von Willebrand disease ([facility]     Wears glasses      None otherwise stated in nurses note    SURGICAL HISTORY           Procedure Laterality Date    APPENDECTOMY      BACK SURGERY      lumbar surgery    BRAIN SURGERY      CARPAL TUNNEL RELEASE Right     CHOLECYSTECTOMY      COLONOSCOPY      DILATION AND CURETTAGE OF UTERUS      few times    HYSTERECTOMY      KNEE ARTHROSCOPY Bilateral     AL KNEE SCOPE,DIAGNOSTIC Bilateral 11/2/2017    KNEE ARTHROSCOPY RIGHT WITH PARTIAL MEDIAL MENISECTOMY, SUBTOTAL LATERAL MENISECTOMY, AND DEBRIDEMENT OF ACL /  NEEDLE ASPIRATION LEFT KNEE performed by Ruby Cruz MD at [address] Right    [address] Right 8/24/2020    KNEE TOTAL ARTHROPLASTY performed by Ruby Cruz MD at [address]     None otherwise stated in nurses note    CURRENT MEDICATIONS       Discharge Medication List as of 9/17/2020  8:05 PM      CONTINUE these medications which have NOT CHANGED    Details   !! Handicap Placard [facility] Starting Mon 9/14/2020, Disp-1 each,R-0, Print5 years, cannot walk over 200 ft.      !! Handicap Placard MIS Starting Mon 9/14/2020, Disp-1 each,R-0, Print5 years, cannot walk over 200 ft.      hydroCHLOROthiazide (HYDRODIURIL) 25 MG tablet TAKE 1 TABLET BY MOUTH EVERY DAY, Disp-90 tablet,R-3Normal      diazePAM (VALIUM) 5 MG tablet Take 1 tablet by mouth every 8 hours as needed for Anxiety for up to 30 days. , Disp-90 tablet,R-0Normal      ibuprofen (ADVIL;MOTRIN) 800 MG tablet Take 1 tablet by mouth every 8 hours as needed for Pain, Disp-90 tablet, R-11Normal Normal      Cholecalciferol 2000 UNITS TABS Take by mouth      Multiple Vitamins-Minerals (THERAPEUTIC MULTIVITAMIN-MINERALS) tablet Take 1 tablet by mouth daily      vitamin B-12 (CYANOCOBALAMIN) 500 MCG tablet Take 500 mcg by mouth daily      pregabalin (LYRICA) 150 MG capsule Take 1 capsule by mouth daily, Disp-60 capsule, R-2      Biotin 5 MG CAPS Take 1 capsule by mouth daily       ! ! - Potential duplicate medications found. Please discuss with provider. ALLERGIES     Adhesive tape; Ddavp [desmopressin acetate]; Meperidine; Morphine; Nubain  [nalbuphine hcl]; Propoxyphene; Tramadol; and Tylenol [acetaminophen]    FAMILY HISTORY           Problem Relation Age of Onset    Kidney Disease Mother     Breast Cancer Mother         breast    Kidney Disease Sister     Breast Cancer Sister         breast    Kidney Disease Brother     Breast Cancer Maternal Grandmother         breast    Breast Cancer Paternal Grandmother         breast     Family Status   Relation Name Status    Mother     Paolo Trujillo Father  Alive    Sister  Alive    Brother  Alive    MGM      PGM        None otherwise stated in nurses note    SOCIAL HISTORY      reports that she has been smoking. She has a 5.00 pack-year smoking history. She has never used smokeless tobacco. She reports that she does not drink alcohol or use drugs. Lives at home with others    PHYSICAL EXAM    (up to 7 for level 4, 8 or more for level 5)     ED Triage Vitals [20 1721]   BP Temp Temp Source Pulse Resp SpO2 Height Weight   125/68 97.8 °F (36.6 °C) Oral 85 16 96 % 5' 3\" (1.6 m) 197 lb (89.4 kg)       Physical Exam   Nursing note and vitals reviewed. Constitutional: Oriented to person, place, and time and well-developed, well-nourished, and in no distress. Head: Normocephalic and atraumatic. Ear: External ears normal.   Nose: Nose normal and midline.    Eyes: Conjunctivae and EOM are normal. Pupils are equal, round, and reactive to light. Cardiovascular: Normal rate, regular rhythm, normal heart sounds and intact distal pulses. Pulmonary/Chest: Effort normal and breath sounds normal. No respiratory distress. No wheezes. No rales. No chest tenderness. Musculoskeletal: Steri-Strips and midline scar to the right knee consistent with recent surgery which appears a well healing and no signs of dehiscence, no signs of infection. Area is slightly tender to palpation. There is tenderness palpation to the right calf and a positive Homans sign. No bands or cords palpated. Neurological: Alert and oriented to person, place, and time. GCS score is 15. Skin: Skin is warm and dry. No rash noted. No erythema. No pallor. DIAGNOSTIC RESULTS     RADIOLOGY:   All plain film, CT, MRI, and formal ultrasound images (except ED bedside ultrasound) are read by the radiologist   VL DUP LOWER EXTREMITY VENOUS RIGHT   Final Result      XR KNEE RIGHT (3 VIEWS)   Final Result   Right total knee arthroplasty without acute osseous abnormality. No DVT    Xr Knee Right (1-2 Views)    Result Date: 9/9/2020  X-rays taken today reviewed by me show standing AP of both knees and a lateral the right knee. Patient is status post recent right total knee arthroplasty. Components appear in excellent position of both AP lateral views. Xr Knee Right (1-2 Views)    Result Date: 8/24/2020  EXAMINATION: TWO XRAY VIEWS OF THE RIGHT KNEE 8/24/2020 12:47 pm COMPARISON: 06/05/2020 HISTORY: ORDERING SYSTEM PROVIDED HISTORY: post op TECHNOLOGIST PROVIDED HISTORY: Of operative side while in recovery room. post op Reason for Exam: post op Acuity: Unknown Type of Exam: Unknown 15-year-old female who is postop in the recovery room FINDINGS: Recent right knee arthroplasty and patellar resurfacing. Gas is seen in the soft tissues about the knee and suprapatellar joint space as well soft as fat.  Distal femoral component demonstrates proper alignment with the tibial tray. No significant periprosthetic lucency or acute osseous abnormality. Soft tissue dressing along the anterior aspect of the knee. Postoperative changes related to recent right knee arthroplasty and patellar resurfacing. Xr Knee Right (3 Views)    Result Date: 9/17/2020  EXAMINATION: THREE XRAY VIEWS OF THE RIGHT KNEE 9/17/2020 5:46 pm COMPARISON: 09/09/2020 HISTORY: ORDERING SYSTEM PROVIDED HISTORY: Rt knee pain s/p injury; Hx of recent Rt total knee replacement TECHNOLOGIST PROVIDED HISTORY: Rt knee pain s/p injury; Hx of recent Rt total knee replacement Reason for Exam: pain Acuity: Unknown Type of Exam: Initial Mechanism of Injury: twisted knee FINDINGS: Right total knee arthroplasty projects in expected position. No periprosthetic fracture. No dislocation. No focal abnormality in the overlying soft tissues. Right total knee arthroplasty without acute osseous abnormality. LABS:  Labs Reviewed - No data to display    All other labs were within normal range or not returned as of this dictation. EMERGENCY DEPARTMENT COURSE and DIFFERENTIAL DIAGNOSIS/MDM:   Vitals:    Vitals:    09/17/20 1721   BP: 125/68   Pulse: 85   Resp: 16   Temp: 97.8 °F (36.6 °C)   TempSrc: Oral   SpO2: 96%   Weight: 197 lb (89.4 kg)   Height: 5' 3\" (1.6 m)       Patient feels better after pain medication. Her  will drive her home. She is instructed to follow-up with her orthopedic doctor and apply ice and take pain medication as needed and directed. Patient instructed to return to the emergency room if symptoms worsen, return, or any other concern right away which is agreed.  Instructed to follow up with pcp/ortho provided as soon as possible    MEDS  Orders Placed This Encounter   Medications    oxyCODONE-acetaminophen (PERCOCET) 5-325 MG per tablet 1 tablet    DISCONTD: fentaNYL (SUBLIMAZE) injection 25 mcg    oxyCODONE-acetaminophen (PERCOCET) 5-325 MG per tablet     Sig: Take 1 tablet by mouth every 8 hours as needed for Pain for up to 4 days. Intended supply: 3 days. Take lowest dose possible to manage pain     Dispense:  10 tablet     Refill:  0    fentaNYL (SUBLIMAZE) injection 25 mcg         CONSULTS:  None    PROCEDURES:  None        FINAL IMPRESSION      1. Acute pain of right knee          DISPOSITION/PLAN   DISPOSITION     PATIENT REFERRED TO:  Tonia Call MD  [address] [address]  [phone]    Schedule an appointment as soon as possible for a visit       [facility] ED  [facility] [address]  [address] [place] [address] Rd [id number] [phone]    For worsening symptoms, or any other concern      DISCHARGE MEDICATIONS:  Discharge Medication List as of 9/17/2020  8:05 PM      START taking these medications    Details   oxyCODONE-acetaminophen (PERCOCET) 5-325 MG per tablet Take 1 tablet by mouth every 8 hours as needed for Pain for up to 4 days. Intended supply: 3 days.  Take lowest dose possible to manage pain, Disp-10 tablet,R-0Print             (Please note that portions of this note were completed with a voice recognition program.  Efforts were made to edit the dictations but occasionally words are mis-transcribed.)    Carrol Pelayo, [address] [facility]  09/17/20 5470

## 2020-10-21 ENCOUNTER — OFFICE VISIT (OUTPATIENT)
Dept: ORTHOPEDIC SURGERY | Age: 55
End: 2020-10-21

## 2020-10-21 PROCEDURE — 99024 POSTOP FOLLOW-UP VISIT: CPT | Performed by: ORTHOPAEDIC SURGERY

## 2020-10-21 NOTE — PROGRESS NOTES
Rowena Ferrera M.D.            [address] [facility] [address] [facility],[address], [address] [facility] [place]           Dept Phone: [phone]           Dept Fax:  [address]           [place]          Dept Phone: [phone]           Dept Fax:  [phone]      Chief Compliant:  Chief Complaint   Patient presents with    Post-Op Check     total knee        History of Present Illness:  Patient returns today status post right TKA times 8 weeks. Patient has no major complaints     Review of Systems   Constitutional: Negative for fever, chills, sweats, recent injury, recent illness  Neurological: Negative for Headaches, numbness, or weakness. Integumentary: Negative for rash, itching, ecchymosis, or wounds. Musculoskeletal: Positive for Post-Op Check (total knee)       Physical Exam:  Constitutional: Patient is oriented to person, place, and time. Patient appears well-developed and well nourished. Musculoskeletal: Normal gait. Motion 0-130 degrees with expected pain with ROM. No Calf tenderness, Negative Karmen's sign. Neurovascular intact. Neurological: Patient is alert and oriented to person, place, and time. Normal strenght. No sensory deficit. Skin: Skin is warm and dry. Incision is healing well without signs of redness or drainage  Nursing note and vitals reviewed. Labs and Imaging:     XR taken today:  No results found. No orders of the defined types were placed in this encounter. Assessment and Plan:  1. Primary osteoarthritis of right knee    2. Primary osteoarthritis of left knee    3. Status post total right knee replacement        8weeks status post right TKA        This is a 48 y.o. female who presents to the clinic today status post right TKA. . Patient is heading to [place] for the winter.   We will see her back next spring the and she will call

## 2020-12-17 PROBLEM — K75.81 NONALCOHOLIC STEATOHEPATITIS (NASH): Status: ACTIVE | Noted: 2017-09-08

## 2020-12-17 PROBLEM — E11.40 TYPE 2 DIABETES MELLITUS WITH DIABETIC NEUROPATHY, WITHOUT LONG-TERM CURRENT USE OF INSULIN (HCC): Status: ACTIVE | Noted: 2018-02-16

## 2021-06-30 ENCOUNTER — OFFICE VISIT (OUTPATIENT)
Dept: ORTHOPEDIC SURGERY | Age: 56
End: 2021-06-30
Payer: MEDICARE

## 2021-06-30 DIAGNOSIS — Z96.651 HISTORY OF TOTAL RIGHT KNEE REPLACEMENT: ICD-10-CM

## 2021-06-30 DIAGNOSIS — M25.562 LEFT KNEE PAIN, UNSPECIFIED CHRONICITY: Primary | ICD-10-CM

## 2021-06-30 PROCEDURE — 99213 OFFICE O/P EST LOW 20 MIN: CPT | Performed by: ORTHOPAEDIC SURGERY

## 2021-06-30 PROCEDURE — 20610 DRAIN/INJ JOINT/BURSA W/O US: CPT | Performed by: ORTHOPAEDIC SURGERY

## 2021-06-30 RX ORDER — TRIAMCINOLONE ACETONIDE 40 MG/ML
40 INJECTION, SUSPENSION INTRA-ARTICULAR; INTRAMUSCULAR ONCE
Status: COMPLETED | OUTPATIENT
Start: 2021-06-30 | End: 2021-06-30

## 2021-06-30 RX ORDER — BUPIVACAINE HYDROCHLORIDE 5 MG/ML
2 INJECTION, SOLUTION PERINEURAL ONCE
Status: COMPLETED | OUTPATIENT
Start: 2021-06-30 | End: 2021-06-30

## 2021-06-30 RX ORDER — LIDOCAINE HYDROCHLORIDE 10 MG/ML
2 INJECTION, SOLUTION INFILTRATION; PERINEURAL ONCE
Status: COMPLETED | OUTPATIENT
Start: 2021-06-30 | End: 2021-06-30

## 2021-06-30 RX ADMIN — TRIAMCINOLONE ACETONIDE 40 MG: 40 INJECTION, SUSPENSION INTRA-ARTICULAR; INTRAMUSCULAR at 08:59

## 2021-06-30 RX ADMIN — BUPIVACAINE HYDROCHLORIDE 10 MG: 5 INJECTION, SOLUTION PERINEURAL at 08:58

## 2021-06-30 RX ADMIN — LIDOCAINE HYDROCHLORIDE 2 ML: 10 INJECTION, SOLUTION INFILTRATION; PERINEURAL at 08:59

## 2021-06-30 NOTE — PROGRESS NOTES
the patient's right knee her total knee notes her incision is pristine. Motion is 0-130 degrees good stability throughout good patellar tracking throughout    Examination patient's left knee notes very slight swelling. Motion is 3-120 degrees Ramon's is relatively unremarkable ligamentously she is grossly intact. No patellofemoral findings no apprehension. Neurological: Patient is alert and oriented to person, place, and time. Normal strenght. No sensory deficit. Skin: Skin is warm and dry  Psychiatric: Behavior is normal. Thought content normal.  Nursing note and vitals reviewed. Labs and Imaging:     XR taken today:  XR KNEE LEFT (1-2 VIEWS)    Result Date: 6/30/2021  X-rays taken today reviewed by me show standing AP of both knees in the lateral left knee. Patient had a previous right total knee arthroplasty components appear to be in excellent position without change patient's left knee is noted to have moderate medial and lateral joint space narrowing but certainly nothing too severe. Her lateral view also notes some mild patellofemoral narrowing but again not severe no acute process is noted          Orders Placed This Encounter   Procedures    20610 - DRAIN/INJECT LARGE JOINT BURSA       Assessment and Plan:  1. Left knee pain, unspecified chronicity    2. History of total right knee replacement        Administrations This Visit     bupivacaine (MARCAINE) 0.5 % injection 10 mg     Admin Date  06/30/2021  08:58 Action  Given Dose  10 mg Route  Intra-articular Site  Knee Left Administered By  Segundo Laughlin LPN    Ordering Provider: Santi Early MD    NDC: 8937-4364-77    Lot#: [id number]    : HOSPIRA    Patient Supplied?: No          lidocaine 1 % injection 2 mL     Admin Date  06/30/2021  08:59 Action  Given Dose  2 mL Route  Intra-articular Site  Knee Left Administered By  Segundo Laughlin LPN    Ordering Provider: Santi Early MD    NDC: 7166-4012-18    Lot#: [id number] : Nic Posey    Patient Supplied?: No          triamcinolone acetonide (KENALOG-40) injection 40 mg     Admin Date  06/30/2021  08:59 Action  Given Dose  40 mg Route  Intra-articular Site  Knee Left Administered By  Hugh Raymond LPN    Ordering Provider: Gaby Antonio MD    NDC: 9465-1311-92    Lot#: [id number]    : [employer] [facility] (PRIMARY CARE)    Patient Supplied?: No                This is a 48 y.o. female who presents to the clinic today for evaluation / follow up of post right total knee 2020. Past History:    Current Outpatient Medications:     diazePAM (VALIUM) 5 MG tablet, Take 1 tablet by mouth every 8 hours as needed for Anxiety for up to 30 days. , Disp: 90 tablet, Rfl: 0    omeprazole (PRILOSEC) 40 MG delayed release capsule, TAKE 1 CAPSULE BY MOUTH EVERY DAY, Disp: 90 capsule, Rfl: 3    escitalopram (LEXAPRO) 10 MG tablet, TAKE 1 TABLET BY MOUTH EVERY DAY, Disp: 90 tablet, Rfl: 3    atorvastatin (LIPITOR) 20 MG tablet, TAKE 1 TABLET BY MOUTH EVERY DAY, Disp: 90 tablet, Rfl: 3    KLOR-CON M20 20 MEQ extended release tablet, TAKE 2 TABLETS BY MOUTH EVERY DAY, Disp: 180 tablet, Rfl: 3    lidocaine viscous hcl (XYLOCAINE) 2 % SOLN solution, Apply with a swab to mouth lesions every hour as needed for pain, Disp: 100 mL, Rfl: 2    magnesium oxide (MAG-OX) 400 MG tablet, Take 1 tablet by mouth daily, Disp: 90 tablet, Rfl: 3    valACYclovir (VALTREX) 500 MG tablet, Take 1 tablet by mouth daily as needed (lesions), Disp: 90 tablet, Rfl: 3    nortriptyline (PAMELOR) 50 MG capsule, Take 3 capsules by mouth nightly, Disp: 270 capsule, Rfl: 3    hydroCHLOROthiazide (HYDRODIURIL) 25 MG tablet, Take 1 tablet by mouth daily, Disp: 90 tablet, Rfl: 3    Handicap Placard MISC, by Does not apply route 5 years, cannot walk over 200 ft., Disp: 1 each, Rfl: 0    Handicap Placard MISC, by Does not apply route 5 years, cannot walk over 200 ft., Disp: 1 each, Rfl: 0    ibuprofen (ADVIL;MOTRIN) 800 MG tablet, Take 1 tablet by mouth every 8 hours as needed for Pain, Disp: 90 tablet, Rfl: 11    spironolactone (ALDACTONE) 50 MG tablet, TAKE 1 TABLET BY MOUTH EVERY DAY, Disp: 90 tablet, Rfl: 3    Omega-3 Fatty Acids ([employer]S FISH OIL) 1000 MG CAPS, TAKE 2 TABLETS BY MOUTH EVERY DAY, Disp: , Rfl:     liothyronine (CYTOMEL) 5 MCG tablet, TAKE 1 TABLET BY MOUTH TWICE A DAY, Disp: , Rfl:     tiZANidine (ZANAFLEX) 4 MG tablet, TAKE 1 TABLET BY MOUTH AT BEDTIME, Disp: , Rfl: 5    metFORMIN, OSM, (FORTAMET) 1000 MG extended release tablet, TAKE 1 TABLET BY MOUTH EVERY DAY, Disp: , Rfl: 1    VICTOZA 18 MG/3ML SOPN SC injection, INJECT 1.2 MG UNDER THE SKIN ONCE DAILY, Disp: , Rfl: 1    levothyroxine (SYNTHROID) 50 MCG tablet, Take 50 mcg by mouth Daily, Disp: , Rfl:     hydrocortisone 1 % cream, Apply topically TID for up to 2 weeks, Disp: 60 g, Rfl: 1    predniSONE (DELTASONE) 5 MG tablet, TAKE 1/2 TABLET BY MOUTH DAILY, Disp: 30 tablet, Rfl: 11    Insulin Pen Needle (B-D ULTRAFINE III SHORT PEN) 31G X 8 MM MISC, Inject 1 each into the skin daily May substitute with any brand, Disp: 100 each, Rfl: 11    oxyCODONE (OXYCONTIN) 30 MG T12A extended release tablet, Take 1 tablet by mouth every 12 hours. , Disp: , Rfl:     triamcinolone (KENALOG) 0.025 % cream, Apply topically 2 times daily. , Disp: 80 g, Rfl: 0    Cholecalciferol 2000 UNITS TABS, Take by mouth, Disp: , Rfl:     Multiple Vitamins-Minerals (THERAPEUTIC MULTIVITAMIN-MINERALS) tablet, Take 1 tablet by mouth daily, Disp: , Rfl:     vitamin B-12 (CYANOCOBALAMIN) 500 MCG tablet, Take 500 mcg by mouth daily, Disp: , Rfl:     pregabalin (LYRICA) 150 MG capsule, Take 1 capsule by mouth daily (Patient taking differently: Take 150 mg by mouth nightly. ), Disp: 60 capsule, Rfl: 2    Biotin 5 MG CAPS, Take 1 capsule by mouth daily, Disp: , Rfl:   Allergies   Allergen Reactions    Adhesive Tape     Ddavp [Desmopressin Acetate] Other (See fusion (intubation by fiber optic of glidescope (Dr. Gina Vega [phone] orthopedic spinal surgion.)     Cancer [facility]) 1987    cervical    Chiari I malformation ([address]     Chronic back pain     Difficult intubation     hx. of cranio-cervical fusion , intubation by fiber optic glidescopy    Elevated liver enzymes 09/2016    Fibromyalgia     GERD (gastroesophageal reflux disease)     Headache     Hepatic steatosis     [place] (hard of hearing)     noel. ears    Kidney stones     Liver disease     Obesity     Osteoarthritis     PONV (postoperative nausea and vomiting)     Seizures ([address]     non epileptic, last     Thyroid disease     hypothyroid    Urinary incontinence     UTI (urinary tract infection)     Von Willebrand disease ([address]     Wears glasses      Past Surgical History:   Procedure Laterality Date    APPENDECTOMY      BACK SURGERY      lumbar surgery    BRAIN SURGERY      CARPAL TUNNEL RELEASE Right     CHOLECYSTECTOMY      COLONOSCOPY      DILATION AND CURETTAGE OF UTERUS      few times    HYSTERECTOMY      KNEE ARTHROSCOPY Bilateral     ME KNEE SCOPE,DIAGNOSTIC Bilateral 11/2/2017    KNEE ARTHROSCOPY RIGHT WITH PARTIAL MEDIAL MENISECTOMY, SUBTOTAL LATERAL MENISECTOMY, AND DEBRIDEMENT OF ACL /  NEEDLE ASPIRATION LEFT KNEE performed by Wanda Taveras MD at [address]     TOTAL KNEE ARTHROPLASTY Right 8/24/2020    KNEE TOTAL ARTHROPLASTY performed by Wanda Taveras MD at [address]     Family History   Problem Relation Age of Onset    Kidney Disease Mother     Breast Cancer Mother         breast    Kidney Disease Sister     Breast Cancer Sister         breast    Kidney Disease Brother     Breast Cancer Maternal Grandmother         breast    Breast Cancer Paternal Grandmother         breast   Plan  An informed verbal consent for the procedure was obtained and risks including, but not limited to: allergy to medications, injection, bleeding, stiffness of joint, recurrence of symptoms, loss of function, swelling, drainage, irrigation, need for surgery and pseudo-septic inflammation, were explained to the patient. Also, discussed was the potential for further injections, irrigation and debridement and surgery. Alternate means of treatment have also been discussed with the patient. Administrations This Visit     bupivacaine (MARCAINE) 0.5 % injection 10 mg     Admin Date  06/30/2021  08:58 Action  Given Dose  10 mg Route  Intra-articular Site  Knee Left Administered By  Katty Armendariz LPN    Ordering Provider: Lucila Combs MD    NDC: 4675-7518-17    Lot#: [id number]    : HOSPIRA    Patient Supplied?: No          lidocaine 1 % injection 2 mL     Admin Date  06/30/2021  08:59 Action  Given Dose  2 mL Route  Intra-articular Site  Knee Left Administered By  Katty Armendariz LPN    Ordering Provider: Lucila Combs MD    NDC: 5258-1809-12    Lot#: [id number]    : HIKMA    Patient Supplied?: No          triamcinolone acetonide (KENALOG-40) injection 40 mg     Admin Date  06/30/2021  08:59 Action  Given Dose  40 mg Route  Intra-articular Site  Knee Left Administered By  Katty Armendariz LPN    Ordering Provider: Lucila Combs MD    NDC: 5127-4575-87    Lot#: [id number]    : [employer] (PRIMARY CARE)    Patient Supplied?: No            Under sterile conditions patient's left knee was injected with lidocaine 2 cc bupivacaine 2 cc and then Kenalog 40 mg. We will see how the patient does with injection otherwise back here annual basis for her knee                  Provider Attestation:  Joo Richards, personally performed the services described in this documentation. All medical record entries made by the scribe were at my direction and in my presence. I have reviewed the chart and discharge instructions and agree that the records reflect my personal performance and is accurate and complete. Tanna Ko Solange Neal MD. 06/30/21      Please note that this chart was generated using voice recognition Dragon dictation software. Although every effort was made to ensure the accuracy of this automated transcription, some errors in transcription may have occurred.

## 2021-07-02 PROBLEM — R79.82 ELEVATED C-REACTIVE PROTEIN: Status: ACTIVE | Noted: 2021-05-30

## 2021-07-02 PROBLEM — R31.29 MICROSCOPIC HEMATURIA: Status: ACTIVE | Noted: 2020-03-09

## 2021-07-02 PROBLEM — R60.0 EDEMA OF BOTH LOWER LEGS DUE TO PERIPHERAL VENOUS INSUFFICIENCY: Status: ACTIVE | Noted: 2018-02-16

## 2021-07-02 PROBLEM — I87.2 EDEMA OF BOTH LOWER LEGS DUE TO PERIPHERAL VENOUS INSUFFICIENCY: Status: ACTIVE | Noted: 2018-02-16

## 2021-07-02 PROBLEM — M25.50 PAIN IN JOINT: Status: ACTIVE | Noted: 2021-05-30

## 2021-08-30 ENCOUNTER — OFFICE VISIT (OUTPATIENT)
Dept: ORTHOPEDIC SURGERY | Age: 56
End: 2021-08-30
Payer: MEDICARE

## 2021-08-30 DIAGNOSIS — Z96.651 HISTORY OF TOTAL RIGHT KNEE REPLACEMENT: Primary | ICD-10-CM

## 2021-08-30 PROCEDURE — 99213 OFFICE O/P EST LOW 20 MIN: CPT | Performed by: ORTHOPAEDIC SURGERY

## 2021-08-30 NOTE — PROGRESS NOTES
Tomas Singh M.D.            [address] [facility] [address] [facility], [address] [facility]           Dept Phone: [phone]           Dept Fax:  [address]           [place]          Dept Phone: [phone]           Dept Fax:  [phone]      Chief Compliant:  Chief Complaint   Patient presents with    Pain     Rt TKA 8/24/20        History of Present Illness: This is a 48 y.o. female who presents to the clinic today for evaluation / follow up of 1 year status post right total knee. Patient states her knee feels very good overall. She did get a injection to her left knee recently per Sussy Leyva and says has really helped out tremendously. Review of Systems   Constitutional: Negative for fever, chills, sweats. Eyes: Negative for changes in vision, or pain. HENT: Negative for ear ache, epistaxis, or sore throat. Respiratory/Cardio: Negative for Chest pain, palpitations, SOB, or cough. Gastrointestinal: Negative for abdominal pain, N/V/D. Genitourinary: Negative for dysuria, frequency, urgency, or hematuria. Neurological: Negative for headache, numbness, or weakness. Integumentary: Negative for rash, itching, laceration, or abrasion. Musculoskeletal: Positive for Pain (Rt TKA 8/24/20)       Physical Exam:  Constitutional: Patient is oriented to person, place, and time. Patient appears well-developed and well nourished. HENT: Negative otherwise noted  Head: Normocephalic and Atraumatic  Nose: Normal  Eyes: Conjunctivae and EOM are normal  Neck: Normal range of motion Neck supple. Respiratory/Cardio: Effort normal. No respiratory distress. Musculoskeletal: Examination of the patient's right knee notes her incisions well-healed. Her motion is about 2 degrees from full extension flexion easily to 135 degrees.   She has good varus and valgus stability and good patellar tracking in height. Patient's left knee was not examined today  Neurological: Patient is alert and oriented to person, place, and time. Normal strenght. No sensory deficit. Skin: Skin is warm and dry  Psychiatric: Behavior is normal. Thought content normal.  Nursing note and vitals reviewed. Labs and Imaging:     XR taken today:  XR KNEE RIGHT (1-2 VIEWS)    Result Date: 8/30/2021  X-rays taken a reviewed by me show AP lateral views the patient's right knee. She status post right total knee arthroplasty components appear to be in excellent position on both views. Patellar height is appropriate. There is been no interval change since x-rays taken in September 2020          Orders Placed This Encounter   Procedures    XR KNEE RIGHT (1-2 VIEWS)     Standing Status:   Future     Number of Occurrences:   1     Standing Expiration Date:   8/26/2022       Assessment and Plan:  1. History of total right knee replacement            This is a 48 y.o. female who presents to the clinic today for evaluation / follow up of 1 year status post right total knee doing very well. Past History:    Current Outpatient Medications:     ciprofloxacin (CIPRO) 500 MG tablet, Take 1 tablet by mouth 2 times daily for 7 days, Disp: 14 tablet, Rfl: 0    predniSONE (DELTASONE) 20 MG tablet, Take 1 tablet by mouth daily for 5 days, Disp: 5 tablet, Rfl: 0    diazePAM (VALIUM) 5 MG tablet, Take 1 tablet by mouth every 8 hours as needed for Anxiety for up to 30 days. , Disp: 90 tablet, Rfl: 0    celecoxib (CELEBREX) 200 MG capsule, TAKE 1 CAPSULE BY MOUTH EVERY DAY AS NEEDED, Disp: , Rfl:     diclofenac sodium (VOLTAREN) 1 % GEL, APPLY 2 GRAMS 4 TIMES A DAY BY TOPICAL ROUTE FOR 30 DAYS., Disp: , Rfl:     methadone (DOLOPHINE) 5 MG tablet, Take 5 mg by mouth 3 times daily. , Disp: , Rfl:     omeprazole (PRILOSEC) 40 MG delayed release capsule, TAKE 1 CAPSULE BY MOUTH EVERY DAY, Disp: 90 capsule, Rfl: 3    escitalopram (LEXAPRO) 10 MG tablet, TAKE 1 TABLET BY MOUTH EVERY DAY, Disp: 90 tablet, Rfl: 3    atorvastatin (LIPITOR) 20 MG tablet, TAKE 1 TABLET BY MOUTH EVERY DAY, Disp: 90 tablet, Rfl: 3    KLOR-CON M20 20 MEQ extended release tablet, TAKE 2 TABLETS BY MOUTH EVERY DAY, Disp: 180 tablet, Rfl: 3    lidocaine viscous hcl (XYLOCAINE) 2 % SOLN solution, Apply with a swab to mouth lesions every hour as needed for pain, Disp: 100 mL, Rfl: 2    magnesium oxide (MAG-OX) 400 MG tablet, Take 1 tablet by mouth daily, Disp: 90 tablet, Rfl: 3    valACYclovir (VALTREX) 500 MG tablet, Take 1 tablet by mouth daily as needed (lesions), Disp: 90 tablet, Rfl: 3    nortriptyline (PAMELOR) 50 MG capsule, Take 3 capsules by mouth nightly, Disp: 270 capsule, Rfl: 3    Handicap Placard MISC, by Does not apply route 5 years, cannot walk over 200 ft., Disp: 1 each, Rfl: 0    Handicap Placard MISC, by Does not apply route 5 years, cannot walk over 200 ft., Disp: 1 each, Rfl: 0    ibuprofen (ADVIL;MOTRIN) 800 MG tablet, Take 1 tablet by mouth every 8 hours as needed for Pain, Disp: 90 tablet, Rfl: 11    spironolactone (ALDACTONE) 50 MG tablet, TAKE 1 TABLET BY MOUTH EVERY DAY, Disp: 90 tablet, Rfl: 3    Omega-3 Fatty Acids ([employer]S FISH OIL) 1000 MG CAPS, TAKE 2 TABLETS BY MOUTH EVERY DAY, Disp: , Rfl:     liothyronine (CYTOMEL) 5 MCG tablet, TAKE 1 TABLET BY MOUTH TWICE A DAY, Disp: , Rfl:     tiZANidine (ZANAFLEX) 4 MG tablet, TAKE 1 TABLET BY MOUTH AT BEDTIME, Disp: , Rfl: 5    metFORMIN, OSM, (FORTAMET) 1000 MG extended release tablet, TAKE 1 TABLET BY MOUTH EVERY DAY, Disp: , Rfl: 1    VICTOZA 18 MG/3ML SOPN SC injection, INJECT 1.2 MG UNDER THE SKIN ONCE DAILY (Patient not taking: Reported on 8/27/2021), Disp: , Rfl: 1    levothyroxine (SYNTHROID) 50 MCG tablet, Take 50 mcg by mouth Daily, Disp: , Rfl:     hydrocortisone 1 % cream, Apply topically TID for up to 2 weeks, Disp: 60 g, Rfl: 1    Insulin Pen Needle (B-D ULTRAFINE III SHORT PEN) 31G X 8 MM MISC, Inject 1 each into the skin daily May substitute with any brand, Disp: 100 each, Rfl: 11    oxyCODONE (OXYCONTIN) 30 MG T12A extended release tablet, Take 1 tablet by mouth every 12 hours. , Disp: , Rfl:     triamcinolone (KENALOG) 0.025 % cream, Apply topically 2 times daily. , Disp: 80 g, Rfl: 0    Cholecalciferol 2000 UNITS TABS, Take by mouth, Disp: , Rfl:     Multiple Vitamins-Minerals (THERAPEUTIC MULTIVITAMIN-MINERALS) tablet, Take 1 tablet by mouth daily, Disp: , Rfl:     vitamin B-12 (CYANOCOBALAMIN) 500 MCG tablet, Take 500 mcg by mouth daily, Disp: , Rfl:     pregabalin (LYRICA) 150 MG capsule, Take 1 capsule by mouth daily (Patient taking differently: Take 150 mg by mouth nightly. ), Disp: 60 capsule, Rfl: 2    Biotin 5 MG CAPS, Take 1 capsule by mouth daily, Disp: , Rfl:   Allergies   Allergen Reactions    Adhesive Tape     Ddavp [Desmopressin Acetate] Other (See Comments)     headaches    Hydrochlorothiazide      Hyponatremia      Meperidine Itching    Morphine Other (See Comments)    Nubain  [Nalbuphine Hcl] Other (See Comments)     Nubain    Propoxyphene     Tramadol     Tylenol [Acetaminophen] Other (See Comments)     Can not take due to liver issues     Social History     Socioeconomic History    Marital status:      Spouse name: Not on file    Number of children: Not on file    Years of education: Not on file    Highest education level: Not on file   Occupational History    Not on file   Tobacco Use    Smoking status: Current Every Day Smoker     Packs/day: 0.25     Years: 20.00     Pack years: 5.00    Smokeless tobacco: Never Used   Vaping Use    Vaping Use: Never used   Substance and Sexual Activity    Alcohol use: No     Alcohol/week: 0.0 standard drinks    Drug use: No    Sexual activity: Not on file   Other Topics Concern    Not on file   Social History Narrative    Not on file     Social Determinants of Health Financial Resource Strain:     Difficulty of Paying Living Expenses:    Food Insecurity:     Worried About Running Out of Food in the Last Year:     [address] in the Last Year:    Transportation Needs:     Lack of Transportation (Medical):      Lack of Transportation (Non-Medical):    Physical Activity:     Days of Exercise per Week:     Minutes of Exercise per Session:    Stress:     Feeling of Stress :    Social Connections:     Frequency of Communication with Friends and Family:     Frequency of Social Gatherings with Friends and Family:     Attends Mandaeism Services:     Active Member of Clubs or Organizations:     Attends Club or Organization Meetings:     Marital Status:    Intimate Partner Violence:     Fear of Current or Ex-Partner:     Emotionally Abused:     Physically Abused:     Sexually Abused:      Past Medical History:   Diagnosis Date    Acid indigestion     hx of    Anesthesia      difficult intubation due to cranio cervical fusion (intubation by fiber optic of glidescope (Dr. Gail Seaman [phone] orthopedic spinal surgion.)     Cancer [facility]) 1987    cervical    Chiari I malformation ([address]     Chronic back pain     Difficult intubation     hx. of cranio-cervical fusion , intubation by fiber optic glidescopy    Elevated liver enzymes 09/2016    Fibromyalgia     GERD (gastroesophageal reflux disease)     Headache     Hepatic steatosis     [place] (hard of hearing)     noel. ears    Kidney stones     Liver disease     Obesity     Osteoarthritis     PONV (postoperative nausea and vomiting)     Seizures ([address]     non epileptic, last     Thyroid disease     hypothyroid    Urinary incontinence     UTI (urinary tract infection)     Von Willebrand disease ([address]     Wears glasses      Past Surgical History:   Procedure Laterality Date    APPENDECTOMY      BACK SURGERY      lumbar surgery    BRAIN SURGERY      CARPAL TUNNEL RELEASE Right     CHOLECYSTECTOMY      COLONOSCOPY      DILATION AND CURETTAGE OF UTERUS      few times    HYSTERECTOMY      KNEE ARTHROSCOPY Bilateral     CA KNEE SCOPE,DIAGNOSTIC Bilateral 11/2/2017    KNEE ARTHROSCOPY RIGHT WITH PARTIAL MEDIAL MENISECTOMY, SUBTOTAL LATERAL MENISECTOMY, AND DEBRIDEMENT OF ACL /  NEEDLE ASPIRATION LEFT KNEE performed by Tyree Garrett MD at [address] Right     TOTAL KNEE ARTHROPLASTY Right 8/24/2020    KNEE TOTAL ARTHROPLASTY performed by Tyree Garrett MD at [address]     Family History   Problem Relation Age of Onset    Kidney Disease Mother     Breast Cancer Mother         breast    Kidney Disease Sister     Breast Cancer Sister         breast    Kidney Disease Brother     Breast Cancer Maternal Grandmother         breast    Breast Cancer Paternal Grandmother         breast   Plan  Patient encouraged to continue exercise program.  She is to follow-up here in 2 years or call if she needs another injection to her left knee. Provider Attestation:  Eamon Magaña, personally performed the services described in this documentation. All medical record entries made by the scribe were at my direction and in my presence. I have reviewed the chart and discharge instructions and agree that the records reflect my personal performance and is accurate and complete. Tyree Garrett MD. 08/30/21      Please note that this chart was generated using voice recognition Dragon dictation software. Although every effort was made to ensure the accuracy of this automated transcription, some errors in transcription may have occurred.

## 2022-06-24 PROBLEM — G62.9 PERIPHERAL POLYNEUROPATHY: Status: ACTIVE | Noted: 2021-09-15

## 2022-06-24 PROBLEM — M15.9 PRIMARY OSTEOARTHRITIS INVOLVING MULTIPLE JOINTS: Status: ACTIVE | Noted: 2018-04-04

## 2022-06-24 PROBLEM — M15.0 PRIMARY OSTEOARTHRITIS INVOLVING MULTIPLE JOINTS: Status: ACTIVE | Noted: 2018-04-04

## 2022-09-16 ENCOUNTER — OFFICE VISIT (OUTPATIENT)
Dept: UROLOGY | Age: 57
End: 2022-09-16
Payer: MEDICARE

## 2022-09-16 VITALS
HEIGHT: 63 IN | TEMPERATURE: 98 F | WEIGHT: 190 LBS | DIASTOLIC BLOOD PRESSURE: 78 MMHG | SYSTOLIC BLOOD PRESSURE: 112 MMHG | BODY MASS INDEX: 33.66 KG/M2

## 2022-09-16 DIAGNOSIS — R39.11 URINARY HESITANCY: ICD-10-CM

## 2022-09-16 DIAGNOSIS — N39.0 RECURRENT UTI: Primary | ICD-10-CM

## 2022-09-16 PROCEDURE — 99204 OFFICE O/P NEW MOD 45 MIN: CPT | Performed by: UROLOGY

## 2022-09-16 ASSESSMENT — ENCOUNTER SYMPTOMS
WHEEZING: 0
VOMITING: 0
SHORTNESS OF BREATH: 0
CONSTIPATION: 0
COUGH: 0
EYE REDNESS: 0
ABDOMINAL PAIN: 0
BACK PAIN: 0
EYE PAIN: 0
NAUSEA: 0

## 2022-09-16 NOTE — LETTER
[address] [address]  Dept: [phone]  Dept Fax: [phone]        9/16/22    Patient: Rajan Michel  YOB: 1965    Dear Mary Ann Moe MD,    I had the pleasure of seeing one of your patients, Donis Mosqueda today in the office today. Below are the relevant portions of my assessment and plan of care. IMPRESSION:  1. Recurrent UTI    2. Urinary hesitancy         PLAN:  Will obtain renal and bladder ultrasound. Hesitancy likely related to diabetes. Discussed timed and double voiding. Prescriptions Ordered:  No orders of the defined types were placed in this encounter. Orders Placed:  No orders of the defined types were placed in this encounter. Thank you for allowing me to participate in the care of this patient. I will keep you updated on this patient's follow up and I look forward to serving you and your patients again in the future.         Marthe Denver, MD

## 2022-09-16 NOTE — PROGRESS NOTES
Review of Systems   Constitutional:  Negative for chills, fatigue and fever. Eyes:  Negative for pain, redness and visual disturbance. Respiratory:  Negative for cough, shortness of breath and wheezing. Cardiovascular:  Negative for chest pain and leg swelling. Gastrointestinal:  Negative for abdominal pain, constipation, nausea and vomiting. Genitourinary:  Positive for difficulty urinating. Negative for dysuria, flank pain, frequency, hematuria and urgency. Musculoskeletal:  Negative for back pain, joint swelling and myalgias. Skin:  Negative for rash and wound. Neurological:  Negative for dizziness, tremors and numbness. Hematological:  Does not bruise/bleed easily.

## 2022-09-16 NOTE — PROGRESS NOTES
[address] [address]  Dept: 92 P[address] Graves [facility] Urology Office Note - New Patient    Patient:  Donovan Rice  YOB: 1965  Date: 9/16/2022    The patient is a 55 y.o. female who presentstoday for evaluation of the following problems:   Chief Complaint   Patient presents with    New Patient     Urinary hesitancy. Family hx hematuria. Two siblings have had or need kidney transplant. Pushing and bending over when urinating      referred by Kika Diaz MD.    HPI  She is here for urinary complaints. She has h/o UTIs. She has some urinary hesitancy as well. She is diabetic. She had some brain surgeries, so she walks with a cane. She had a Chiari malformation. When she has an infection, she has burning. She does not have flank pain or fevers. She has not been hospitalized for a UTI. She has passed 2 kidney stones. She is a smoker. She was told she had blood in her urine. (Patient's old records have been requested, reviewed and summarized in today's note.)    Summary of old records: N/A    History: N/A    ProceduresToday: N/A    Urinalysis today:  No results found for this visit on 09/16/22. AUA Symptom Score (9/16/2022):                                Last BUN andcreatinine:  Lab Results   Component Value Date    BUN 7 08/06/2021     Lab Results   Component Value Date    CREATININE 0.85 08/06/2021       Additional Lab/Culture results: none    Reviewed during this Office Visit: none  (results were independently reviewed byphysician and radiology report verified)    PAST MEDICAL, FAMILY AND SOCIAL HISTORY:  Past Medical History:   Diagnosis Date    Acid indigestion     hx of    Anesthesia      difficult intubation due to cranio cervical fusion (intubation by fiber optic of glidescope (Dr. Naresh Jones [phone] orthopedic spinal surgion.)     Cancer [facility]) 1987    cervical    Chiari I malformation ([facility] [address]     Chronic back pain     Difficult intubation     hx. of cranio-cervical fusion , intubation by fiber optic glidescopy    Elevated liver enzymes 09/2016    Fibromyalgia     GERD (gastroesophageal reflux disease)     Headache     Hepatic steatosis     [place] (hard of hearing)     noel. ears    Kidney stones     Liver disease     Obesity     Osteoarthritis     PONV (postoperative nausea and vomiting)     Seizures ([address]     non epileptic, last     Thyroid disease     hypothyroid    Urinary incontinence     UTI (urinary tract infection)     Von Willebrand disease ([address]     Wears glasses      Past Surgical History:   Procedure Laterality Date    APPENDECTOMY      BACK SURGERY      lumbar surgery    BRAIN SURGERY      CARPAL TUNNEL RELEASE Right     CHOLECYSTECTOMY      COLONOSCOPY      DILATION AND CURETTAGE OF UTERUS      few times    HYSTERECTOMY (CERVIX STATUS UNKNOWN)      KNEE ARTHROSCOPY Bilateral     VA KNEE SCOPE,DIAGNOSTIC Bilateral 11/2/2017    KNEE ARTHROSCOPY RIGHT WITH PARTIAL MEDIAL MENISECTOMY, SUBTOTAL LATERAL MENISECTOMY, AND DEBRIDEMENT OF ACL /  NEEDLE ASPIRATION LEFT KNEE performed by Young Lopez MD at [address] [facility] Right     TOTAL KNEE ARTHROPLASTY Right 8/24/2020    KNEE TOTAL ARTHROPLASTY performed by Young Lopez MD at [address]     Family History   Problem Relation Age of Onset    Kidney Disease Mother     Breast Cancer Mother         breast    Kidney Disease Sister     Breast Cancer Sister         breast    Kidney Disease Brother     Breast Cancer Maternal Grandmother         breast    Breast Cancer Paternal Grandmother         breast     Outpatient Medications Marked as Taking for the 9/16/22 encounter (Office Visit) with Marthe Denver, MD   Medication Sig Dispense Refill    magnesium oxide (MAG-OX) 400 (240 Mg) MG tablet TAKE 1 TABLET BY MOUTH EVERY DAY      OZEMPIC, 0.25 OR 0.5 MG/DOSE, 2 MG/1.5ML SOPN escitalopram (LEXAPRO) 10 MG tablet Take 1.5 tablets by mouth daily 135 tablet 3    levETIRAcetam (KEPPRA) 500 MG tablet Take 1 tablet by mouth in the morning and at bedtime      levothyroxine (SYNTHROID) 75 MCG tablet Take 1 tablet by mouth once      oxyCODONE HCl (OXY-IR) 10 MG immediate release tablet TAKE 1 TABLET BY MOUTH EVERY 12 HOURS AS NEEDED FOR PAIN (NON ACUTE PAIN)      omeprazole (PRILOSEC) 40 MG delayed release capsule TAKE 1 CAPSULE BY MOUTH EVERY DAY 90 capsule 3    nortriptyline (PAMELOR) 50 MG capsule TAKE 3 CAPSULES BY MOUTH NIGHTLY. 270 capsule 3    KLOR-CON M20 20 MEQ extended release tablet TAKE 2 TABLETS BY MOUTH EVERY  tablet 3    lidocaine viscous hcl (XYLOCAINE) 2 % SOLN solution Apply with a swab to mouth lesions every hour as needed for pain 100 mL 2    atorvastatin (LIPITOR) 20 MG tablet TAKE 1 TABLET BY MOUTH EVERY DAY 90 tablet 3    magnesium oxide (MAG-OX) 400 MG tablet TAKE 1 TABLET BY MOUTH EVERY DAY 90 tablet 3    ibuprofen (ADVIL;MOTRIN) 800 MG tablet Take 1 tablet by mouth every 8 hours as needed for Pain 90 tablet 11    hydroCHLOROthiazide (HYDRODIURIL) 25 MG tablet Take 1 tablet by mouth daily 90 tablet 3    spironolactone (ALDACTONE) 50 MG tablet Take 1 tablet by mouth daily 90 tablet 3    celecoxib (CELEBREX) 200 MG capsule TAKE 1 CAPSULE BY MOUTH EVERY DAY AS NEEDED      diclofenac sodium (VOLTAREN) 1 % GEL APPLY 2 GRAMS 4 TIMES A DAY BY TOPICAL ROUTE FOR 30 DAYS. methadone (DOLOPHINE) 5 MG tablet Take 5 mg by mouth 3 times daily.       valACYclovir (VALTREX) 500 MG tablet Take 1 tablet by mouth daily as needed (lesions) 90 tablet 3    Handicap Placard MISC by Does not apply route 5 years, cannot walk over 200 ft. 1 each 0    Handicap Placard MISC by Does not apply route 5 years, cannot walk over 200 ft. 1 each 0    Omega-3 Fatty Acids ([employer] FISH OIL) 1000 MG CAPS TAKE 2 TABLETS BY MOUTH EVERY DAY      liothyronine (CYTOMEL) 5 MCG tablet TAKE 1 TABLET BY MOUTH TWICE A DAY      tiZANidine (ZANAFLEX) 4 MG tablet TAKE 1 TABLET BY MOUTH AT BEDTIME  5    metFORMIN, OSM, (FORTAMET) 1000 MG extended release tablet TAKE 1 TABLET BY MOUTH EVERY DAY  1    hydrocortisone 1 % cream Apply topically TID for up to 2 weeks 60 g 1    Insulin Pen Needle (B-D ULTRAFINE III SHORT PEN) 31G X 8 MM MISC Inject 1 each into the skin daily May substitute with any brand 100 each 11    oxyCODONE (OXYCONTIN) 30 MG T12A extended release tablet Take 1 tablet by mouth every 12 hours. triamcinolone (KENALOG) 0.025 % cream Apply topically 2 times daily. 80 g 0    Cholecalciferol 2000 UNITS TABS Take by mouth      Multiple Vitamins-Minerals (THERAPEUTIC MULTIVITAMIN-MINERALS) tablet Take 1 tablet by mouth daily      vitamin B-12 (CYANOCOBALAMIN) 500 MCG tablet Take 500 mcg by mouth daily      pregabalin (LYRICA) 150 MG capsule Take 1 capsule by mouth daily (Patient taking differently: Take 150 mg by mouth nightly.) 60 capsule 2    Biotin 5 MG CAPS Take 1 capsule by mouth daily         Meperidine hcl, Nalbuphine, Adhesive tape, Ddavp [desmopressin acetate], Hydrochlorothiazide, Meperidine, Morphine, Nubain  [nalbuphine hcl], Propoxyphene, Tramadol, and Tylenol [acetaminophen]  Social History     Tobacco Use   Smoking Status Every Day    Packs/day: 0.25    Years: 20.00    Pack years: 5.00    Types: Cigarettes   Smokeless Tobacco Never      (If patient a smoker, smoking cessation counseling offered)   Social History     Substance and Sexual Activity   Alcohol Use No    Alcohol/week: 0.0 standard drinks       REVIEW OF SYSTEMS:  Review of Systems    Physical Exam:    This a 55 y.o. female      Vitals:    09/16/22 1014   BP: 112/78   Temp: 98 °F (36.7 °C)     Body mass index is 33.66 kg/m². Physical Exam  Constitutional: Patient in no acute distress, ggod grooming, appropriately dressed  Neuro: Alert and oriented to person, place and time.   Psych:Mood normal, affect normal  Skin: No rash noted  HEENT: Head: Normocephalic and atraumatic,Conjunctivae and EOM are normal,Nose- normal, Right/Left External Ear: normal, Mouth: Mucosa Moist  Neck: Supple  Lungs: Respiratory effort is normal  Cardiovascular: strong and regular, no lower leg edema  Abdomen: Soft, non-tender, non-distended with no CVA,    Lymphatics: No cervical palpable lymphadenopathy. Bladder non-tender and not distended. Musculoskeletal: Normal gait and station        Assessment and Plan      1. Recurrent UTI    2. Urinary hesitancy            Plan: Will obtain renal and bladder ultrasound. Hesitancy likely related to diabetes. Discussed timed and double voiding. Prescriptions Ordered:  No orders of the defined types were placed in this encounter. Orders Placed:  No orders of the defined types were placed in this encounter. Becca Salazar MD    Agree with the ROS entered by the MA.

## 2022-09-22 ENCOUNTER — HOSPITAL ENCOUNTER (OUTPATIENT)
Dept: ULTRASOUND IMAGING | Age: 57
Discharge: HOME | End: 2022-09-24
Payer: MEDICARE

## 2022-09-22 DIAGNOSIS — N39.0 RECURRENT UTI: ICD-10-CM

## 2022-09-22 DIAGNOSIS — R39.11 URINARY HESITANCY: ICD-10-CM

## 2022-09-22 PROCEDURE — 76770 US EXAM ABDO BACK WALL COMP: CPT

## 2022-12-30 PROBLEM — H90.3 ASYMMETRICAL SENSORINEURAL HEARING LOSS: Status: ACTIVE | Noted: 2022-10-06

## 2023-01-16 ENCOUNTER — OFFICE VISIT (OUTPATIENT)
Dept: UROLOGY | Age: 58
End: 2023-01-16
Payer: MEDICARE

## 2023-01-16 VITALS
BODY MASS INDEX: 34.55 KG/M2 | HEIGHT: 63 IN | RESPIRATION RATE: 16 BRPM | SYSTOLIC BLOOD PRESSURE: 121 MMHG | TEMPERATURE: 97.6 F | WEIGHT: 195 LBS | HEART RATE: 93 BPM | DIASTOLIC BLOOD PRESSURE: 76 MMHG

## 2023-01-16 DIAGNOSIS — R39.11 URINARY HESITANCY: ICD-10-CM

## 2023-01-16 DIAGNOSIS — N39.0 RECURRENT UTI: Primary | ICD-10-CM

## 2023-01-16 PROCEDURE — 99213 OFFICE O/P EST LOW 20 MIN: CPT | Performed by: NURSE PRACTITIONER

## 2023-01-16 ASSESSMENT — ENCOUNTER SYMPTOMS
COUGH: 0
EYE REDNESS: 0
NAUSEA: 0
DIARRHEA: 0
SHORTNESS OF BREATH: 0
BACK PAIN: 0
WHEEZING: 0
CONSTIPATION: 0
VOMITING: 0
ABDOMINAL PAIN: 0
EYE PAIN: 0

## 2023-01-16 NOTE — PROGRESS NOTES
Review of Systems   Constitutional:  Negative for appetite change, chills and fever. Eyes:  Negative for pain, redness and visual disturbance. Respiratory:  Negative for cough, shortness of breath and wheezing. Cardiovascular:  Negative for chest pain and leg swelling. Gastrointestinal:  Negative for abdominal pain, constipation, diarrhea, nausea and vomiting. Genitourinary:  Negative for difficulty urinating, dysuria, flank pain, frequency, hematuria and urgency. Musculoskeletal:  Negative for back pain, joint swelling and myalgias. Skin:  Negative for rash and wound. Neurological:  Negative for dizziness, tremors and numbness. Hematological:  Does not bruise/bleed easily.  none

## 2023-01-16 NOTE — PROGRESS NOTES
[address] [facility] [id number]  Dept: 92 [address] [facility] Urology Office Note - Established    Patient:  Guicho Dillon  YOB: 1965  Date: 1/16/2023    The patient is a 65 y.o. female whopresents today for evaluation of the following problems:   Chief Complaint   Patient presents with    Results     6 weeks Us renal done 9/22 patient had infection was on macrobid       HPI  This is a pleasant 72-year-old female with a history of frequent UTIs, kidney stones, diabetes, and current smoker presenting for routine follow up. Patient states that her typical UTI symptoms are burning. I am unable to find any recent urine culture results on her. Patient states that she typically will call her primary care physician, report the symptoms, and be treated for an infection. Patient is unable to correlate infection with intercourse. She does take baths on occasion, avoids scented products. Denies any urinary incontinence or having to wear protective lining in her underwear on a regular basis. She does have a history of diabetes as well, complains of some urinary hesitancy at times. Generally feels like she empties her bladder well. She did complete a renal ultrasound prior to today's visit which was negative. She is here to discuss the management of recurrent UTI. Denies any other further  concerns today. Summary of old records: N/A    Additional History: N/A    Procedures Today: N/A    Urinalysis today:  No results found for this visit on 01/16/23. Imaging Reviewed during this Office Visit:   Narrative   EXAMINATION:   RETROPERITONEAL ULTRASOUND OF THE KIDNEYS AND URINARY BLADDER       9/22/2022       COMPARISON:   None       HISTORY:   ORDERING SYSTEM PROVIDED HISTORY: Recurrent UTI   TECHNOLOGIST PROVIDED HISTORY:   This procedure can be scheduled via [employer].   Access your [employer] account by   visiting [employer]. FINDINGS:       Kidneys: The right kidney measures 12.5 cm in length and the left kidney measures 11.2   cm in length. Kidneys demonstrate normal cortical echogenicity. No evidence of   hydronephrosis or intrarenal stones. Bladder:       Unremarkable appearance of the bladder. No significant post void residual.           Impression   Unremarkable ultrasound of the kidneys and urinary bladder.     (results were independently reviewed by physician and radiology report verified)    AUA Symptom Score (1/16/2023):   INCOMPLETE EMPTYING: How often have you had the sensation of not emptying your bladder?: Not at all  FREQUENCY: How often do you have to urinate less than every two hours?: Not at all  INTERMITTENCY: How often have you found you stopped and started again several times when you urinated?: Not at all  URGENCY: How often have you found it difficult to postpone urination?: Not at all  WEAK STREAM: How often have you had a weak urinary stream?: Not at all  STRAINING: How often have you had to strain to start  urination?: Not at all  NOCTURIA: How many times did you typically get up at night to uriniate?: NONE  TOTAL I-PSS SCORE[de-identified] 0       Last BUN and creatinine:  Lab Results   Component Value Date    BUN 7 08/06/2021     Lab Results   Component Value Date    CREATININE 0.85 08/06/2021       Additional Lab/Culture results: none    PAST MEDICAL, FAMILY AND SOCIAL HISTORY UPDATE:  Past Medical History:   Diagnosis Date    Acid indigestion     hx of    Anesthesia      difficult intubation due to cranio cervical fusion (intubation by fiber optic of glidescope (Dr. Nilson Edwards [phone] orthopedic spinal surgion.)     Cancer [facility]) 1987    cervical    Chiari I malformation ([facility] [address]     Chronic back pain     Difficult intubation     hx. of cranio-cervical fusion , intubation by fiber optic glidescopy    Elevated liver enzymes 09/2016    Fibromyalgia     GERD (gastroesophageal reflux disease)     Headache     Hepatic steatosis     [place] (hard of hearing)     noel. ears    Kidney stones     Liver disease     Obesity     Osteoarthritis     PONV (postoperative nausea and vomiting)     Seizures ([address]     non epileptic, last     Thyroid disease     hypothyroid    Urinary incontinence     UTI (urinary tract infection)     Von Willebrand disease     Wears glasses      Past Surgical History:   Procedure Laterality Date    APPENDECTOMY      BACK SURGERY      lumbar surgery    BRAIN SURGERY      CARPAL TUNNEL RELEASE Right     CHOLECYSTECTOMY      COLONOSCOPY      DILATION AND CURETTAGE OF UTERUS      few times    HYSTERECTOMY (CERVIX STATUS UNKNOWN)      KNEE ARTHROSCOPY Bilateral     FL ARTHROSCOPY KNEE DIAGNOSTIC W/WO SYNOVIAL BX SPX Bilateral 11/2/2017    KNEE ARTHROSCOPY RIGHT WITH PARTIAL MEDIAL MENISECTOMY, SUBTOTAL LATERAL MENISECTOMY, AND DEBRIDEMENT OF ACL /  NEEDLE ASPIRATION LEFT KNEE performed by Gi Ramos MD at [address] [facility] [place]     TOTAL KNEE ARTHROPLASTY Right 8/24/2020    KNEE TOTAL ARTHROPLASTY performed by Gi Ramos MD at [id number] [place], [place] [address]     Family History   Problem Relation Age of Onset    Kidney Disease Mother     Breast Cancer Mother         breast    Kidney Disease Sister     Breast Cancer Sister         breast    Kidney Disease Brother     Breast Cancer Maternal Grandmother         breast    Breast Cancer Paternal Grandmother         breast     Outpatient Medications Marked as Taking for the 1/16/23 encounter (Office Visit) with BRIAN Burciaga CNP   Medication Sig Dispense Refill    KLOR-CON M20 20 MEQ extended release tablet TAKE 1 TABLET BY MOUTH EVERY DAY 90 tablet 3    magnesium oxide (MAG-OX) 400 (240 Mg) MG tablet TAKE 1 TABLET BY MOUTH EVERY DAY 90 tablet 3    ibuprofen (ADVIL;MOTRIN) 800 MG tablet TAKE 1 TABLET BY MOUTH EVERY 8 HOURS AS NEEDED FOR PAIN 90 tablet 11    lidocaine viscous hcl (XYLOCAINE) 2 % SOLN solution Apply with a swab to mouth lesions every hour as needed for pain 100 mL 2    spironolactone (ALDACTONE) 50 MG tablet TAKE 1 TABLET BY MOUTH EVERY DAY 90 tablet 3    hydroCHLOROthiazide (HYDRODIURIL) 25 MG tablet TAKE 1 TABLET BY MOUTH EVERY DAY 90 tablet 3    magnesium oxide (MAG-OX) 400 (240 Mg) MG tablet TAKE 1 TABLET BY MOUTH EVERY DAY      OZEMPIC, 0.25 OR 0.5 MG/DOSE, 2 MG/1.5ML SOPN       escitalopram (LEXAPRO) 10 MG tablet Take 1.5 tablets by mouth daily 135 tablet 3    levETIRAcetam (KEPPRA) 500 MG tablet Take 1 tablet by mouth in the morning and at bedtime      levothyroxine (SYNTHROID) 75 MCG tablet Take 1 tablet by mouth once      oxyCODONE HCl (OXY-IR) 10 MG immediate release tablet TAKE 1 TABLET BY MOUTH EVERY 12 HOURS AS NEEDED FOR PAIN (NON ACUTE PAIN)      omeprazole (PRILOSEC) 40 MG delayed release capsule TAKE 1 CAPSULE BY MOUTH EVERY DAY 90 capsule 3    nortriptyline (PAMELOR) 50 MG capsule TAKE 3 CAPSULES BY MOUTH NIGHTLY. 270 capsule 3    atorvastatin (LIPITOR) 20 MG tablet TAKE 1 TABLET BY MOUTH EVERY DAY 90 tablet 3    celecoxib (CELEBREX) 200 MG capsule TAKE 1 CAPSULE BY MOUTH EVERY DAY AS NEEDED      diclofenac sodium (VOLTAREN) 1 % GEL APPLY 2 GRAMS 4 TIMES A DAY BY TOPICAL ROUTE FOR 30 DAYS. methadone (DOLOPHINE) 5 MG tablet Take 5 mg by mouth 3 times daily.       valACYclovir (VALTREX) 500 MG tablet Take 1 tablet by mouth daily as needed (lesions) 90 tablet 3    Handicap Placard MISC by Does not apply route 5 years, cannot walk over 200 ft. 1 each 0    Handicap Placard MISC by Does not apply route 5 years, cannot walk over 200 ft. 1 each 0    Omega-3 Fatty Acids ([employer]S FISH OIL) 1000 MG CAPS TAKE 2 TABLETS BY MOUTH EVERY DAY      liothyronine (CYTOMEL) 5 MCG tablet TAKE 1 TABLET BY MOUTH TWICE A DAY      tiZANidine (ZANAFLEX) 4 MG tablet TAKE 1 TABLET BY MOUTH AT BEDTIME  5    metFORMIN, OSM, (FORTAMET) 1000 MG extended release tablet TAKE 1 TABLET BY MOUTH EVERY DAY  1 hydrocortisone 1 % cream Apply topically TID for up to 2 weeks 60 g 1    Insulin Pen Needle (B-D ULTRAFINE III SHORT PEN) 31G X 8 MM MISC Inject 1 each into the skin daily May substitute with any brand 100 each 11    triamcinolone (KENALOG) 0.025 % cream Apply topically 2 times daily. 80 g 0    Cholecalciferol 2000 UNITS TABS Take by mouth      Multiple Vitamins-Minerals (THERAPEUTIC MULTIVITAMIN-MINERALS) tablet Take 1 tablet by mouth daily      vitamin B-12 (CYANOCOBALAMIN) 500 MCG tablet Take 500 mcg by mouth daily      pregabalin (LYRICA) 150 MG capsule Take 1 capsule by mouth daily (Patient taking differently: Take 150 mg by mouth nightly.) 60 capsule 2    Biotin 5 MG CAPS Take 1 capsule by mouth daily        (All medications reviewed and updated by provider sincelast office visit or hospitalization)   Meperidine hcl, Nalbuphine, Adhesive tape, Ddavp [desmopressin acetate], Hydrochlorothiazide, Meperidine, Morphine, Nubain  [nalbuphine hcl], Propoxyphene, Tramadol, and Tylenol [acetaminophen]  Social History     Tobacco Use   Smoking Status Every Day    Packs/day: 0.25    Years: 20.00    Pack years: 5.00    Types: Cigarettes   Smokeless Tobacco Never      (If patient a smoker, smoking cessation counseling offered)     Social History     Substance and Sexual Activity   Alcohol Use No    Alcohol/week: 0.0 standard drinks       REVIEW OF SYSTEMS:  Review of Systems      Physical Exam:      Vitals:    01/16/23 1325   BP: 121/76   Pulse: 93   Resp: 16   Temp: 97.6 °F (36.4 °C)     Body mass index is 34.54 kg/m². Patient is a 65 y.o. female in noacute distress and alert and oriented to person, place and time. Physical Exam  Constitutional: Patient in no acute distress. Neuro: Alert andoriented to person, place and time.   Psych: Mood normal, affect normal  Skin: No rash noted  Lungs: Respiratory effort is normal  Cardiovascular: Warm & Pink  Abdomen: Soft, non-tender, non-distended with no CVA,  No flank tenderness  Bladder non-tender and not distended. Musculoskeletal: Normal gait and station      and Plan      1. Recurrent UTI    2. Urinary hesitancy           Plan:   Renal US was negative    Conservative measures for UTI prevention. Avoid saturating pads. Avoid constipation - recommend bowel regimen. Recommend timed voiding, and double voiding or crede maneuver if having difficulty emptying bladder. Discussed cranberry extract and probiotics. Dmannose if cultures prove e-coli infections. Standing order for urine culture- complete if symptomatic. Will consider Hiprex and/or estrace cream if infections found to be persistent. Discussed cystoscopy in future if persistent    Return in about 6 months (around 7/16/2023), or if symptoms worsen or fail to improve. Prescriptions Ordered:  No orders of the defined types were placed in this encounter. Orders Placed:  Orders Placed This Encounter   Procedures    Culture, Urine     Standing Status:   Standing     Number of Occurrences:   11     Standing Expiration Date:   1/16/2024     Order Specific Question:   Specify (ex-cath, midstream, cysto, etc)? Answer:   mid stream            BRIAN Gurrola CNP    Reviewed and agree with the ROS entered by the MA.

## 2023-05-16 PROBLEM — F32.1 EPISODE OF MODERATE MAJOR DEPRESSION (HCC): Status: ACTIVE | Noted: 2023-05-16

## 2023-07-13 ENCOUNTER — OFFICE VISIT (OUTPATIENT)
Dept: ORTHOPEDIC SURGERY | Age: 58
End: 2023-07-13
Payer: MEDICARE

## 2023-07-13 DIAGNOSIS — M25.562 LEFT KNEE PAIN, UNSPECIFIED CHRONICITY: Primary | ICD-10-CM

## 2023-07-13 PROCEDURE — 99213 OFFICE O/P EST LOW 20 MIN: CPT | Performed by: ORTHOPAEDIC SURGERY

## 2023-07-13 NOTE — PROGRESS NOTES
Minna Wiseman M.D.            [address] [facility], [address] [facility], [address] [place] [address] [address]           Dept Phone: [phone]           Dept Fax:  [address]          Dept Phone: [phone]           Dept Fax:  [phone]      Chief Compliant:  Chief Complaint   Patient presents with    Pain     Lt knee        History of Present Illness: This is a 65 y.o. female who presents to the clinic today for evaluation / follow up of left knee pain. Patient is a 79-year-old young lady who had a previous right total knee arthroplasty performed in August 2020. She has no problems with her right knee at all. Patient states her left knee is giving out on her as constant pain she has had several near falls. She has had a couple injections in the past which were marginally helpful but the last 1 did not help at all. This was a greater than 3 months ago. Review of Systems   Constitutional: Negative for fever, chills, sweats. Eyes: Negative for changes in vision, or pain. HENT: Negative for ear ache, epistaxis, or sore throat. Respiratory/Cardio: Negative for Chest pain, palpitations, SOB, or cough. Gastrointestinal: Negative for abdominal pain, N/V/D. Genitourinary: Negative for dysuria, frequency, urgency, or hematuria. Neurological: Negative for headache, numbness, or weakness. Integumentary: Negative for rash, itching, laceration, or abrasion. Musculoskeletal: Positive for Pain (Lt knee)       Physical Exam:  Constitutional: Patient is oriented to person, place, and time. Patient appears well-developed and well nourished. HENT: Negative otherwise noted  Head: Normocephalic and Atraumatic  Nose: Normal  Eyes: Conjunctivae and EOM are normal  Neck: Normal range of motion Neck supple.     Respiratory/Cardio: Effort normal. No

## 2023-07-18 ENCOUNTER — OFFICE VISIT (OUTPATIENT)
Dept: UROLOGY | Age: 58
End: 2023-07-18
Payer: MEDICARE

## 2023-07-18 VITALS
DIASTOLIC BLOOD PRESSURE: 80 MMHG | HEART RATE: 88 BPM | TEMPERATURE: 97.5 F | BODY MASS INDEX: 35.44 KG/M2 | WEIGHT: 200 LBS | SYSTOLIC BLOOD PRESSURE: 122 MMHG | HEIGHT: 63 IN

## 2023-07-18 DIAGNOSIS — N39.0 RECURRENT UTI: Primary | ICD-10-CM

## 2023-07-18 DIAGNOSIS — Z87.442 HISTORY OF KIDNEY STONES: ICD-10-CM

## 2023-07-18 DIAGNOSIS — R30.0 DYSURIA: ICD-10-CM

## 2023-07-18 PROCEDURE — 99213 OFFICE O/P EST LOW 20 MIN: CPT | Performed by: NURSE PRACTITIONER

## 2023-07-18 RX ORDER — PHENAZOPYRIDINE HYDROCHLORIDE 200 MG/1
200 TABLET, FILM COATED ORAL 3 TIMES DAILY PRN
Qty: 30 TABLET | Refills: 0 | Status: SHIPPED | OUTPATIENT
Start: 2023-07-18 | End: 2023-08-17

## 2023-07-18 ASSESSMENT — ENCOUNTER SYMPTOMS
DIARRHEA: 0
EYE REDNESS: 0
ABDOMINAL PAIN: 0
NAUSEA: 0
VOMITING: 0
SHORTNESS OF BREATH: 0
BACK PAIN: 0
EYE PAIN: 0
COUGH: 0
WHEEZING: 0
CONSTIPATION: 0

## 2023-07-18 NOTE — PROGRESS NOTES
[address] [facility] [address]  Dept: [address] [place] [address] Urology Office Note - Established    Patient:  Tona Castleman  YOB: 1965  Date: 7/18/2023    The patient is a 65 y.o. female whopresents today for evaluation of the following problems:   Chief Complaint   Patient presents with    Other     6 months        HPI  This is a pleasant 69-year-old female with a history of frequent UTIs, kidney stones, diabetes, and current smoker presenting for routine follow up. Patient states that her typical UTI symptoms are burning. She does not routinely gets cultures but has been treated symptomatically by other providers in the past.  She does not associate infections to intercourse. She avoids pads and scented products. Occasionally takes baths. She is not constipated. Had a renal us 9/16/22 which was negative. Last infection was in the last couple of months- she was in [place] at that time, took macrobid and symptoms resolved. Denies any other  concerns today. Summary of old records: N/A    Additional History: N/A    Procedures Today: N/A    Urinalysis today:  No results found for this visit on 07/18/23.     Imaging Reviewed during this Office Visit: none  Last BUN and creatinine:  Lab Results   Component Value Date    BUN 7 08/06/2021     Lab Results   Component Value Date    CREATININE 0.85 08/06/2021       Additional Lab/Culture results: none    PAST MEDICAL, FAMILY AND SOCIAL HISTORY UPDATE:  Past Medical History:   Diagnosis Date    Acid indigestion     hx of    Anesthesia      difficult intubation due to cranio cervical fusion (intubation by fiber optic of glidescope (Dr. Jose Luis Mcnally [phone] orthopedic spinal surgion.)     Cancer [facility]) 1987    cervical    Chiari I malformation ([facility])     Chronic back pain     Difficult intubation     hx. of cranio-cervical fusion , intubation by fiber

## 2023-08-14 NOTE — H&P (VIEW-ONLY)
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 Based on my personal evaluation of patient including review of patient's chart, no additional clearance requested for scheduled surgery. Dr. Michael Trejo office will be responsible for making sure the clearance is obtained and is in the chart for surgery.         Richie Bee, BRIAN - CNP on 8/15/2023 at 2:34 PM    Total time spent on encounter- PAT provider minutes: 31-40 minutes

## 2023-08-14 NOTE — H&P
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 Based on my personal evaluation of patient including review of patient's chart, no additional clearance requested for scheduled surgery. Dr. Tan Cadet office will be responsible for making sure the clearance is obtained and is in the chart for surgery.         Derik Lo, BRIAN - CNP on 8/15/2023 at 2:34 PM    Total time spent on encounter- PAT provider minutes: 31-40 minutes

## 2023-08-15 ENCOUNTER — HOSPITAL ENCOUNTER (OUTPATIENT)
Dept: PREADMISSION TESTING | Age: 58
Discharge: HOME | End: 2023-08-19
Attending: ORTHOPAEDIC SURGERY | Admitting: ORTHOPAEDIC SURGERY
Payer: MEDICARE

## 2023-08-15 VITALS
HEIGHT: 63 IN | RESPIRATION RATE: 16 BRPM | TEMPERATURE: 98.2 F | HEART RATE: 81 BPM | WEIGHT: 192 LBS | DIASTOLIC BLOOD PRESSURE: 66 MMHG | OXYGEN SATURATION: 96 % | SYSTOLIC BLOOD PRESSURE: 100 MMHG | BODY MASS INDEX: 34.02 KG/M2

## 2023-08-15 LAB
ANION GAP SERPL CALCULATED.3IONS-SCNC: 9 MMOL/L (ref 9–17)
BACTERIA URNS QL MICRO: NORMAL
BASOPHILS # BLD: 0.1 K/UL (ref 0–0.2)
BASOPHILS NFR BLD: 1 % (ref 0–2)
BILIRUB UR QL STRIP: NEGATIVE
BUN SERPL-MCNC: 7 MG/DL (ref 6–20)
CALCIUM SERPL-MCNC: 9.4 MG/DL (ref 8.6–10.4)
CASTS #/AREA URNS LPF: NORMAL /LPF
CHLORIDE SERPL-SCNC: 96 MMOL/L (ref 98–107)
CLARITY UR: CLEAR
CO2 SERPL-SCNC: 32 MMOL/L (ref 20–31)
COLOR UR: YELLOW
CREAT SERPL-MCNC: 0.7 MG/DL (ref 0.5–0.9)
EOSINOPHIL # BLD: 0.5 K/UL (ref 0–0.4)
EOSINOPHILS RELATIVE PERCENT: 5 % (ref 0–4)
EPI CELLS #/AREA URNS HPF: NORMAL /HPF
ERYTHROCYTE [DISTWIDTH] IN BLOOD BY AUTOMATED COUNT: 16 % (ref 11.5–14.9)
GFR SERPL CREATININE-BSD FRML MDRD: >60 ML/MIN/1.73M2
GLUCOSE SERPL-MCNC: 102 MG/DL (ref 70–99)
GLUCOSE UR STRIP-MCNC: NEGATIVE MG/DL
HCT VFR BLD AUTO: 40.4 % (ref 36–46)
HGB BLD-MCNC: 13.3 G/DL (ref 12–16)
HGB UR QL STRIP.AUTO: NEGATIVE
KETONES UR STRIP-MCNC: NEGATIVE MG/DL
LEUKOCYTE ESTERASE UR QL STRIP: ABNORMAL
LYMPHOCYTES NFR BLD: 2.3 K/UL (ref 1–4.8)
LYMPHOCYTES RELATIVE PERCENT: 23 % (ref 24–44)
MCH RBC QN AUTO: 26.9 PG (ref 26–34)
MCHC RBC AUTO-ENTMCNC: 32.9 G/DL (ref 31–37)
MCV RBC AUTO: 81.8 FL (ref 80–100)
MONOCYTES NFR BLD: 0.7 K/UL (ref 0.1–1.3)
MONOCYTES NFR BLD: 7 % (ref 1–7)
NEUTROPHILS NFR BLD: 64 % (ref 36–66)
NEUTS SEG NFR BLD: 6.6 K/UL (ref 1.3–9.1)
NITRITE UR QL STRIP: NEGATIVE
PH UR STRIP: 7 [PH] (ref 5–8)
PLATELET # BLD AUTO: 305 K/UL (ref 150–450)
PMV BLD AUTO: 9.3 FL (ref 6–12)
POTASSIUM SERPL-SCNC: 3.7 MMOL/L (ref 3.7–5.3)
PROT UR STRIP-MCNC: NEGATIVE MG/DL
RBC # BLD AUTO: 4.94 M/UL (ref 4–5.2)
RBC #/AREA URNS HPF: NORMAL /HPF
SODIUM SERPL-SCNC: 137 MMOL/L (ref 135–144)
SP GR UR STRIP: 1.01 (ref 1–1.03)
UROBILINOGEN UR STRIP-ACNC: NORMAL EU/DL (ref 0–1)
WBC #/AREA URNS HPF: NORMAL /HPF
WBC OTHER # BLD: 10.2 K/UL (ref 3.5–11)

## 2023-08-15 PROCEDURE — 80048 BASIC METABOLIC PNL TOTAL CA: CPT

## 2023-08-15 PROCEDURE — 81001 URINALYSIS AUTO W/SCOPE: CPT

## 2023-08-15 PROCEDURE — 93005 ELECTROCARDIOGRAM TRACING: CPT | Performed by: ANESTHESIOLOGY

## 2023-08-15 PROCEDURE — 87641 MR-STAPH DNA AMP PROBE: CPT

## 2023-08-15 PROCEDURE — 36415 COLL VENOUS BLD VENIPUNCTURE: CPT

## 2023-08-15 PROCEDURE — 85025 COMPLETE CBC W/AUTO DIFF WBC: CPT

## 2023-08-15 ASSESSMENT — ENCOUNTER SYMPTOMS
SORE THROAT: 0
DIARRHEA: 0
WHEEZING: 0
CONSTIPATION: 0
SHORTNESS OF BREATH: 0
COUGH: 0
ABDOMINAL PAIN: 0
VOMITING: 1
BACK PAIN: 1
NAUSEA: 0

## 2023-08-15 NOTE — DISCHARGE INSTRUCTIONS
Pre-op Instructions For Out-Patient Surgery    Medication Instructions:  Please stop herbs and any supplements now (includes vitamins and minerals). Please contact your surgeon and prescribing physician for pre-op instructions for any blood thinners. Stop Celebrex & Ibuprofen as directed    If you have inhalers/aerosol treatments at home, please use them the morning of your surgery and bring the inhalers with you to the hospital.    Please take the following medications the morning of your surgery with a sip of water:    Keppra, Levothyroxine, Liothyronine, Diazepam if needed    Surgery Instructions:  After midnight before surgery:  Do not eat or drink anything, including water, mints, gum, and hard candy. You may brush your teeth without swallowing. No smoking, chewing tobacco, or street drugs. Please shower or bathe before surgery. If you were given Surgical Scrub Chlorhexidine Gluconate Liquid (CHG), please shower the night before and the morning of your surgery following the detailed instructions you received during your pre-admission visit. Please do not wear any cologne, lotion, powder, deodorant, jewelry, piercings, perfume, makeup, nail polish, hair accessories, or hair spray on the day of surgery. Wear loose comfortable clothing. Leave your valuables at home. Bring a storage case for any glasses/contacts. An adult who is responsible for you MUST drive you home and should be with you for the first 24 hours after surgery. If having out-patient knee and foot surgeries, please arrange for planned crutches, walker, or wheelchair before arriving to the hospital.    The Day of Surgery:  Arrive at [facility] AT [facility] Surgery Entrance at the time directed by your surgeon and check in at the desk. If you have a living will or healthcare power of , please bring a copy.     You will be taken to the pre-op holding area where you will be prepared for

## 2023-08-16 LAB
EKG ATRIAL RATE: 84 BPM
EKG P AXIS: 62 DEGREES
EKG P-R INTERVAL: 184 MS
EKG Q-T INTERVAL: 398 MS
EKG QRS DURATION: 90 MS
EKG QTC CALCULATION (BAZETT): 470 MS
EKG R AXIS: 48 DEGREES
EKG T AXIS: 61 DEGREES
EKG VENTRICULAR RATE: 84 BPM
MRSA, DNA, NASAL: NEGATIVE
SPECIMEN DESCRIPTION: NORMAL

## 2023-08-16 PROCEDURE — 93010 ELECTROCARDIOGRAM REPORT: CPT | Performed by: INTERNAL MEDICINE

## 2023-08-24 ENCOUNTER — TELEPHONE (OUTPATIENT)
Dept: ORTHOPEDIC SURGERY | Age: 58
End: 2023-08-24

## 2023-08-24 NOTE — TELEPHONE ENCOUNTER
In this case the meds I believe need to be given with infusions so she would need to stay. Want to go with hematologist recommendation of 3 days?

## 2023-08-24 NOTE — TELEPHONE ENCOUNTER
Oralia from [address] [place] [address] reached out to inquire how long Dr. Tova Cheung was planning on keeping pt in hospital following TKA as hematologist is recommending the following rx: Humate P and Lysteda, which are non-refundable medications. Pt DOS is 8/29/23. Oralia states that pt's hematologist is recommending 3 day stay as pt has done well with 3 day stay with phx sx. Cole Booker is informed that a message will be sent to Dr. Tova Cheung and a member of the office will reach back out with his response.

## 2023-08-24 NOTE — TELEPHONE ENCOUNTER
Dr. Huang Mcclendon,    Please advise how long you would like pt to stay in the hospital following TKA on 8/29/23. Hematologist is recommending a 3 day stay.

## 2023-08-28 ENCOUNTER — ANESTHESIA EVENT (OUTPATIENT)
Dept: OPERATING ROOM | Age: 58
DRG: 470 | End: 2023-08-28
Payer: MEDICARE

## 2023-08-28 NOTE — TELEPHONE ENCOUNTER
Called and spoke with the pharmacy to let them know we will follow the hematologist recommendation of 3 days due to the ashley YANES

## 2023-08-29 ENCOUNTER — APPOINTMENT (OUTPATIENT)
Dept: GENERAL RADIOLOGY | Age: 58
DRG: 470 | End: 2023-08-29
Attending: ORTHOPAEDIC SURGERY
Payer: MEDICARE

## 2023-08-29 ENCOUNTER — ANESTHESIA (OUTPATIENT)
Dept: OPERATING ROOM | Age: 58
DRG: 470 | End: 2023-08-29
Payer: MEDICARE

## 2023-08-29 ENCOUNTER — HOSPITAL ENCOUNTER (INPATIENT)
Age: 58
LOS: 3 days | Discharge: HOME | DRG: 470 | End: 2023-09-01
Attending: ORTHOPAEDIC SURGERY | Admitting: ORTHOPAEDIC SURGERY
Payer: MEDICARE

## 2023-08-29 DIAGNOSIS — M17.12 PRIMARY OSTEOARTHRITIS OF LEFT KNEE: Primary | ICD-10-CM

## 2023-08-29 LAB
GLUCOSE BLD-MCNC: 223 MG/DL (ref 65–105)
GLUCOSE BLD-MCNC: 95 MG/DL (ref 65–105)

## 2023-08-29 PROCEDURE — 2709999900 HC NON-CHARGEABLE SUPPLY: Performed by: ORTHOPAEDIC SURGERY

## 2023-08-29 PROCEDURE — 6360000002 HC RX W HCPCS: Performed by: ORTHOPAEDIC SURGERY

## 2023-08-29 PROCEDURE — 2500000003 HC RX 250 WO HCPCS: Performed by: ANESTHESIOLOGY

## 2023-08-29 PROCEDURE — 2500000003 HC RX 250 WO HCPCS

## 2023-08-29 PROCEDURE — 2580000003 HC RX 258: Performed by: ANESTHESIOLOGY

## 2023-08-29 PROCEDURE — 2580000003 HC RX 258: Performed by: ORTHOPAEDIC SURGERY

## 2023-08-29 PROCEDURE — 1200000000 HC SEMI PRIVATE

## 2023-08-29 PROCEDURE — C9399 UNCLASSIFIED DRUGS OR BIOLOG: HCPCS

## 2023-08-29 PROCEDURE — 2720000010 HC SURG SUPPLY STERILE: Performed by: ORTHOPAEDIC SURGERY

## 2023-08-29 PROCEDURE — 7100000000 HC PACU RECOVERY - FIRST 15 MIN: Performed by: ORTHOPAEDIC SURGERY

## 2023-08-29 PROCEDURE — 6360000002 HC RX W HCPCS: Performed by: ANESTHESIOLOGY

## 2023-08-29 PROCEDURE — 6370000000 HC RX 637 (ALT 250 FOR IP): Performed by: ORTHOPAEDIC SURGERY

## 2023-08-29 PROCEDURE — C1776 JOINT DEVICE (IMPLANTABLE): HCPCS | Performed by: ORTHOPAEDIC SURGERY

## 2023-08-29 PROCEDURE — 64447 NJX AA&/STRD FEMORAL NRV IMG: CPT | Performed by: ANESTHESIOLOGY

## 2023-08-29 PROCEDURE — 6360000002 HC RX W HCPCS: Performed by: INTERNAL MEDICINE

## 2023-08-29 PROCEDURE — 3600000003 HC SURGERY LEVEL 3 BASE: Performed by: ORTHOPAEDIC SURGERY

## 2023-08-29 PROCEDURE — 6360000002 HC RX W HCPCS

## 2023-08-29 PROCEDURE — 3700000000 HC ANESTHESIA ATTENDED CARE: Performed by: ORTHOPAEDIC SURGERY

## 2023-08-29 PROCEDURE — 2500000003 HC RX 250 WO HCPCS: Performed by: ORTHOPAEDIC SURGERY

## 2023-08-29 PROCEDURE — 73560 X-RAY EXAM OF KNEE 1 OR 2: CPT

## 2023-08-29 PROCEDURE — 0SRD0J9 REPLACEMENT OF LEFT KNEE JOINT WITH SYNTHETIC SUBSTITUTE, CEMENTED, OPEN APPROACH: ICD-10-PCS | Performed by: ORTHOPAEDIC SURGERY

## 2023-08-29 PROCEDURE — 3700000001 HC ADD 15 MINUTES (ANESTHESIA): Performed by: ORTHOPAEDIC SURGERY

## 2023-08-29 PROCEDURE — C1713 ANCHOR/SCREW BN/BN,TIS/BN: HCPCS | Performed by: ORTHOPAEDIC SURGERY

## 2023-08-29 PROCEDURE — 82947 ASSAY GLUCOSE BLOOD QUANT: CPT

## 2023-08-29 PROCEDURE — 3600000013 HC SURGERY LEVEL 3 ADDTL 15MIN: Performed by: ORTHOPAEDIC SURGERY

## 2023-08-29 PROCEDURE — 7100000001 HC PACU RECOVERY - ADDTL 15 MIN: Performed by: ORTHOPAEDIC SURGERY

## 2023-08-29 DEVICE — COMPONENT PAT DIA34MM THK7.8MM THN KNEE POLY 3 PEG SER A: Type: IMPLANTABLE DEVICE | Site: KNEE | Status: FUNCTIONAL

## 2023-08-29 DEVICE — IMPLANTABLE DEVICE: Type: IMPLANTABLE DEVICE | Site: KNEE | Status: FUNCTIONAL

## 2023-08-29 DEVICE — BEARING TIB L67MM THK12MM KNEE ARCM ANT STBL MOD COMPLT: Type: IMPLANTABLE DEVICE | Site: KNEE | Status: FUNCTIONAL

## 2023-08-29 DEVICE — CEMENT BNE 40GM HI VISC RADPQ FOR REV SURG: Type: IMPLANTABLE DEVICE | Site: KNEE | Status: FUNCTIONAL

## 2023-08-29 DEVICE — TRAY TIB L67MM KNEE CO CHROM FIN MOD INTLOK VANGUARD: Type: IMPLANTABLE DEVICE | Site: KNEE | Status: FUNCTIONAL

## 2023-08-29 RX ORDER — VALACYCLOVIR HYDROCHLORIDE 500 MG/1
500 TABLET, FILM COATED ORAL DAILY PRN
Status: DISCONTINUED | OUTPATIENT
Start: 2023-08-29 | End: 2023-08-29 | Stop reason: RX

## 2023-08-29 RX ORDER — LIDOCAINE HYDROCHLORIDE 20 MG/ML
INJECTION, SOLUTION EPIDURAL; INFILTRATION; INTRACAUDAL; PERINEURAL PRN
Status: DISCONTINUED | OUTPATIENT
Start: 2023-08-29 | End: 2023-08-29 | Stop reason: SDUPTHER

## 2023-08-29 RX ORDER — DIAZEPAM 2 MG/1
5 TABLET ORAL EVERY 8 HOURS PRN
Status: DISCONTINUED | OUTPATIENT
Start: 2023-08-29 | End: 2023-09-01 | Stop reason: HOSPADM

## 2023-08-29 RX ORDER — ONDANSETRON 2 MG/ML
INJECTION INTRAMUSCULAR; INTRAVENOUS PRN
Status: DISCONTINUED | OUTPATIENT
Start: 2023-08-29 | End: 2023-08-29 | Stop reason: SDUPTHER

## 2023-08-29 RX ORDER — ONDANSETRON 2 MG/ML
4 INJECTION INTRAMUSCULAR; INTRAVENOUS
Status: DISCONTINUED | OUTPATIENT
Start: 2023-08-29 | End: 2023-08-29 | Stop reason: HOSPADM

## 2023-08-29 RX ORDER — SODIUM CHLORIDE 0.9 % (FLUSH) 0.9 %
5-40 SYRINGE (ML) INJECTION EVERY 12 HOURS SCHEDULED
Status: DISCONTINUED | OUTPATIENT
Start: 2023-08-29 | End: 2023-08-29 | Stop reason: HOSPADM

## 2023-08-29 RX ORDER — SODIUM CHLORIDE, SODIUM LACTATE, POTASSIUM CHLORIDE, CALCIUM CHLORIDE 600; 310; 30; 20 MG/100ML; MG/100ML; MG/100ML; MG/100ML
INJECTION, SOLUTION INTRAVENOUS CONTINUOUS
Status: DISCONTINUED | OUTPATIENT
Start: 2023-08-29 | End: 2023-09-01 | Stop reason: HOSPADM

## 2023-08-29 RX ORDER — NORTRIPTYLINE HYDROCHLORIDE 50 MG/1
150 CAPSULE ORAL NIGHTLY
Status: DISCONTINUED | OUTPATIENT
Start: 2023-08-29 | End: 2023-09-01 | Stop reason: HOSPADM

## 2023-08-29 RX ORDER — FENTANYL CITRATE 50 UG/ML
INJECTION, SOLUTION INTRAMUSCULAR; INTRAVENOUS PRN
Status: DISCONTINUED | OUTPATIENT
Start: 2023-08-29 | End: 2023-08-29 | Stop reason: SDUPTHER

## 2023-08-29 RX ORDER — SPIRONOLACTONE 25 MG/1
50 TABLET ORAL DAILY
Status: DISCONTINUED | OUTPATIENT
Start: 2023-08-29 | End: 2023-09-01 | Stop reason: HOSPADM

## 2023-08-29 RX ORDER — SODIUM CHLORIDE 0.9 % (FLUSH) 0.9 %
5-40 SYRINGE (ML) INJECTION PRN
Status: DISCONTINUED | OUTPATIENT
Start: 2023-08-29 | End: 2023-08-29 | Stop reason: HOSPADM

## 2023-08-29 RX ORDER — DIPHENHYDRAMINE HYDROCHLORIDE 50 MG/ML
12.5 INJECTION INTRAMUSCULAR; INTRAVENOUS
Status: DISCONTINUED | OUTPATIENT
Start: 2023-08-29 | End: 2023-08-29 | Stop reason: HOSPADM

## 2023-08-29 RX ORDER — PROPOFOL 10 MG/ML
INJECTION, EMULSION INTRAVENOUS PRN
Status: DISCONTINUED | OUTPATIENT
Start: 2023-08-29 | End: 2023-08-29 | Stop reason: SDUPTHER

## 2023-08-29 RX ORDER — HYDRALAZINE HYDROCHLORIDE 20 MG/ML
10 INJECTION INTRAMUSCULAR; INTRAVENOUS
Status: DISCONTINUED | OUTPATIENT
Start: 2023-08-29 | End: 2023-08-29 | Stop reason: HOSPADM

## 2023-08-29 RX ORDER — GABAPENTIN 600 MG/1
800 TABLET ORAL ONCE
Status: COMPLETED | OUTPATIENT
Start: 2023-08-29 | End: 2023-08-29

## 2023-08-29 RX ORDER — CHOLECALCIFEROL (VITAMIN D3) 125 MCG
500 CAPSULE ORAL DAILY
Status: DISCONTINUED | OUTPATIENT
Start: 2023-08-30 | End: 2023-09-01 | Stop reason: HOSPADM

## 2023-08-29 RX ORDER — M-VIT,TX,IRON,MINS/CALC/FOLIC 27MG-0.4MG
1 TABLET ORAL DAILY
Status: DISCONTINUED | OUTPATIENT
Start: 2023-08-30 | End: 2023-09-01 | Stop reason: HOSPADM

## 2023-08-29 RX ORDER — SODIUM CHLORIDE 0.9 % (FLUSH) 0.9 %
5-40 SYRINGE (ML) INJECTION PRN
Status: DISCONTINUED | OUTPATIENT
Start: 2023-08-29 | End: 2023-09-01 | Stop reason: HOSPADM

## 2023-08-29 RX ORDER — OXYCODONE HYDROCHLORIDE 10 MG/1
10 TABLET ORAL EVERY 4 HOURS PRN
Status: DISCONTINUED | OUTPATIENT
Start: 2023-08-29 | End: 2023-09-01 | Stop reason: HOSPADM

## 2023-08-29 RX ORDER — SODIUM CHLORIDE 9 MG/ML
INJECTION, SOLUTION INTRAVENOUS PRN
Status: DISCONTINUED | OUTPATIENT
Start: 2023-08-29 | End: 2023-09-01 | Stop reason: HOSPADM

## 2023-08-29 RX ORDER — SODIUM CHLORIDE 0.9 % (FLUSH) 0.9 %
5-40 SYRINGE (ML) INJECTION EVERY 12 HOURS SCHEDULED
Status: DISCONTINUED | OUTPATIENT
Start: 2023-08-29 | End: 2023-09-01 | Stop reason: HOSPADM

## 2023-08-29 RX ORDER — TIZANIDINE 4 MG/1
4 TABLET ORAL NIGHTLY
Status: DISCONTINUED | OUTPATIENT
Start: 2023-08-29 | End: 2023-09-01 | Stop reason: HOSPADM

## 2023-08-29 RX ORDER — SODIUM CHLORIDE 0.9 % (FLUSH) 0.9 %
5-40 SYRINGE (ML) INJECTION PRN
Status: DISCONTINUED | OUTPATIENT
Start: 2023-08-29 | End: 2023-08-29

## 2023-08-29 RX ORDER — OXYCODONE HYDROCHLORIDE AND ACETAMINOPHEN 5; 325 MG/1; MG/1
1 TABLET ORAL EVERY 4 HOURS PRN
Qty: 42 TABLET | Refills: 0 | Status: SHIPPED | OUTPATIENT
Start: 2023-08-29 | End: 2023-09-05

## 2023-08-29 RX ORDER — HYDROMORPHONE HYDROCHLORIDE 2 MG/ML
INJECTION, SOLUTION INTRAMUSCULAR; INTRAVENOUS; SUBCUTANEOUS PRN
Status: DISCONTINUED | OUTPATIENT
Start: 2023-08-29 | End: 2023-08-29 | Stop reason: SDUPTHER

## 2023-08-29 RX ORDER — SODIUM CHLORIDE 9 MG/ML
INJECTION, SOLUTION INTRAVENOUS PRN
Status: DISCONTINUED | OUTPATIENT
Start: 2023-08-29 | End: 2023-08-29 | Stop reason: HOSPADM

## 2023-08-29 RX ORDER — SCOLOPAMINE TRANSDERMAL SYSTEM 1 MG/1
1 PATCH, EXTENDED RELEASE TRANSDERMAL ONCE
Status: DISCONTINUED | OUTPATIENT
Start: 2023-08-29 | End: 2023-08-29

## 2023-08-29 RX ORDER — CALCIUM CHLORIDE 100 MG/ML
INJECTION INTRAVENOUS; INTRAVENTRICULAR PRN
Status: DISCONTINUED | OUTPATIENT
Start: 2023-08-29 | End: 2023-08-29 | Stop reason: ALTCHOICE

## 2023-08-29 RX ORDER — OMEPRAZOLE 40 MG/1
40 CAPSULE, DELAYED RELEASE ORAL DAILY
Status: DISCONTINUED | OUTPATIENT
Start: 2023-08-29 | End: 2023-08-29 | Stop reason: CLARIF

## 2023-08-29 RX ORDER — MEPERIDINE HYDROCHLORIDE 25 MG/ML
12.5 INJECTION INTRAMUSCULAR; INTRAVENOUS; SUBCUTANEOUS EVERY 5 MIN PRN
Status: DISCONTINUED | OUTPATIENT
Start: 2023-08-29 | End: 2023-08-29

## 2023-08-29 RX ORDER — LANOLIN ALCOHOL/MO/W.PET/CERES
400 CREAM (GRAM) TOPICAL DAILY
Status: DISCONTINUED | OUTPATIENT
Start: 2023-08-30 | End: 2023-09-01 | Stop reason: HOSPADM

## 2023-08-29 RX ORDER — LEVOTHYROXINE SODIUM 0.07 MG/1
75 TABLET ORAL
Status: DISCONTINUED | OUTPATIENT
Start: 2023-08-30 | End: 2023-09-01 | Stop reason: HOSPADM

## 2023-08-29 RX ORDER — TRANEXAMIC ACID 650 MG/1
1300 TABLET ORAL 3 TIMES DAILY
Status: DISCONTINUED | OUTPATIENT
Start: 2023-08-29 | End: 2023-09-01 | Stop reason: HOSPADM

## 2023-08-29 RX ORDER — DEXAMETHASONE SODIUM PHOSPHATE 4 MG/ML
INJECTION, SOLUTION INTRA-ARTICULAR; INTRALESIONAL; INTRAMUSCULAR; INTRAVENOUS; SOFT TISSUE
Status: DISCONTINUED | OUTPATIENT
Start: 2023-08-29 | End: 2023-08-29 | Stop reason: SDUPTHER

## 2023-08-29 RX ORDER — ATORVASTATIN CALCIUM 20 MG/1
20 TABLET, FILM COATED ORAL DAILY
Status: DISCONTINUED | OUTPATIENT
Start: 2023-08-30 | End: 2023-09-01 | Stop reason: HOSPADM

## 2023-08-29 RX ORDER — ROCURONIUM BROMIDE 10 MG/ML
INJECTION, SOLUTION INTRAVENOUS PRN
Status: DISCONTINUED | OUTPATIENT
Start: 2023-08-29 | End: 2023-08-29 | Stop reason: SDUPTHER

## 2023-08-29 RX ORDER — ESCITALOPRAM OXALATE 20 MG/1
20 TABLET ORAL DAILY
Status: DISCONTINUED | OUTPATIENT
Start: 2023-08-30 | End: 2023-09-01 | Stop reason: HOSPADM

## 2023-08-29 RX ORDER — LABETALOL HYDROCHLORIDE 5 MG/ML
10 INJECTION, SOLUTION INTRAVENOUS
Status: DISCONTINUED | OUTPATIENT
Start: 2023-08-29 | End: 2023-08-29 | Stop reason: HOSPADM

## 2023-08-29 RX ORDER — LIDOCAINE HYDROCHLORIDE 10 MG/ML
1 INJECTION, SOLUTION EPIDURAL; INFILTRATION; INTRACAUDAL; PERINEURAL
Status: COMPLETED | OUTPATIENT
Start: 2023-08-29 | End: 2023-08-29

## 2023-08-29 RX ORDER — SODIUM CHLORIDE 0.9 % (FLUSH) 0.9 %
5-40 SYRINGE (ML) INJECTION EVERY 12 HOURS SCHEDULED
Status: DISCONTINUED | OUTPATIENT
Start: 2023-08-29 | End: 2023-08-29

## 2023-08-29 RX ORDER — TRANEXAMIC ACID 100 MG/ML
INJECTION, SOLUTION INTRAVENOUS PRN
Status: DISCONTINUED | OUTPATIENT
Start: 2023-08-29 | End: 2023-08-29 | Stop reason: SDUPTHER

## 2023-08-29 RX ORDER — ACETAMINOPHEN 325 MG/1
650 TABLET ORAL
Status: DISCONTINUED | OUTPATIENT
Start: 2023-08-29 | End: 2023-08-29

## 2023-08-29 RX ORDER — DEXAMETHASONE SODIUM PHOSPHATE 10 MG/ML
10 INJECTION, SOLUTION INTRAMUSCULAR; INTRAVENOUS ONCE
Status: COMPLETED | OUTPATIENT
Start: 2023-08-29 | End: 2023-08-29

## 2023-08-29 RX ORDER — SODIUM CHLORIDE, SODIUM LACTATE, POTASSIUM CHLORIDE, CALCIUM CHLORIDE 600; 310; 30; 20 MG/100ML; MG/100ML; MG/100ML; MG/100ML
INJECTION, SOLUTION INTRAVENOUS CONTINUOUS
Status: DISCONTINUED | OUTPATIENT
Start: 2023-08-29 | End: 2023-08-29 | Stop reason: HOSPADM

## 2023-08-29 RX ORDER — METHADONE HYDROCHLORIDE 5 MG/1
5 TABLET ORAL 3 TIMES DAILY
Status: DISCONTINUED | OUTPATIENT
Start: 2023-08-30 | End: 2023-08-29

## 2023-08-29 RX ORDER — FENTANYL CITRATE 0.05 MG/ML
25 INJECTION, SOLUTION INTRAMUSCULAR; INTRAVENOUS EVERY 5 MIN PRN
Status: DISCONTINUED | OUTPATIENT
Start: 2023-08-29 | End: 2023-08-29 | Stop reason: HOSPADM

## 2023-08-29 RX ORDER — HYDROCHLOROTHIAZIDE 25 MG/1
25 TABLET ORAL DAILY
Status: DISCONTINUED | OUTPATIENT
Start: 2023-08-30 | End: 2023-09-01 | Stop reason: HOSPADM

## 2023-08-29 RX ORDER — SODIUM CHLORIDE 9 MG/ML
INJECTION, SOLUTION INTRAVENOUS CONTINUOUS
Status: DISCONTINUED | OUTPATIENT
Start: 2023-08-29 | End: 2023-08-29 | Stop reason: HOSPADM

## 2023-08-29 RX ORDER — METOCLOPRAMIDE HYDROCHLORIDE 5 MG/ML
10 INJECTION INTRAMUSCULAR; INTRAVENOUS
Status: DISCONTINUED | OUTPATIENT
Start: 2023-08-29 | End: 2023-08-29 | Stop reason: HOSPADM

## 2023-08-29 RX ORDER — MIDAZOLAM HYDROCHLORIDE 1 MG/ML
INJECTION INTRAMUSCULAR; INTRAVENOUS PRN
Status: DISCONTINUED | OUTPATIENT
Start: 2023-08-29 | End: 2023-08-29 | Stop reason: SDUPTHER

## 2023-08-29 RX ORDER — TRANEXAMIC ACID 650 MG/1
1300 TABLET ORAL ONCE
Status: DISCONTINUED | OUTPATIENT
Start: 2023-08-29 | End: 2023-08-29 | Stop reason: DRUGHIGH

## 2023-08-29 RX ORDER — LIOTHYRONINE SODIUM 5 UG/1
5 TABLET ORAL 2 TIMES DAILY
Status: DISCONTINUED | OUTPATIENT
Start: 2023-08-29 | End: 2023-09-01 | Stop reason: HOSPADM

## 2023-08-29 RX ORDER — LEVETIRACETAM 500 MG/1
500 TABLET ORAL 2 TIMES DAILY
Status: DISCONTINUED | OUTPATIENT
Start: 2023-08-29 | End: 2023-09-01 | Stop reason: HOSPADM

## 2023-08-29 RX ORDER — POTASSIUM CHLORIDE 20 MEQ/1
40 TABLET, EXTENDED RELEASE ORAL DAILY
Status: DISCONTINUED | OUTPATIENT
Start: 2023-08-30 | End: 2023-09-01 | Stop reason: HOSPADM

## 2023-08-29 RX ORDER — OXYCODONE HYDROCHLORIDE 5 MG/1
5 TABLET ORAL EVERY 4 HOURS PRN
Status: DISCONTINUED | OUTPATIENT
Start: 2023-08-29 | End: 2023-09-01 | Stop reason: HOSPADM

## 2023-08-29 RX ORDER — DIPHENHYDRAMINE HYDROCHLORIDE 25 MG/1
1 TABLET ORAL DAILY
Status: DISCONTINUED | OUTPATIENT
Start: 2023-08-29 | End: 2023-08-29 | Stop reason: RX

## 2023-08-29 RX ORDER — LIDOCAINE HYDROCHLORIDE 20 MG/ML
INJECTION, SOLUTION EPIDURAL; INFILTRATION; INTRACAUDAL; PERINEURAL
Status: DISCONTINUED | OUTPATIENT
Start: 2023-08-29 | End: 2023-08-29 | Stop reason: SDUPTHER

## 2023-08-29 RX ORDER — PANTOPRAZOLE SODIUM 40 MG/1
40 TABLET, DELAYED RELEASE ORAL
Status: DISCONTINUED | OUTPATIENT
Start: 2023-08-30 | End: 2023-09-01 | Stop reason: HOSPADM

## 2023-08-29 RX ADMIN — HYDROMORPHONE HYDROCHLORIDE 0.8 MG: 2 INJECTION, SOLUTION INTRAMUSCULAR; INTRAVENOUS; SUBCUTANEOUS at 15:14

## 2023-08-29 RX ADMIN — ANTIHEMOPHILIC FACTOR/VON WILLEBRAND FACTOR COMPLEX (HUMAN) 3792 UNITS: KIT at 12:47

## 2023-08-29 RX ADMIN — SUGAMMADEX 200 MG: 100 INJECTION, SOLUTION INTRAVENOUS at 15:37

## 2023-08-29 RX ADMIN — TRANEXAMIC ACID 1000 MG: 100 INJECTION, SOLUTION INTRAVENOUS at 15:28

## 2023-08-29 RX ADMIN — DEXAMETHASONE SODIUM PHOSPHATE 10 MG: 10 INJECTION INTRAMUSCULAR; INTRAVENOUS at 13:03

## 2023-08-29 RX ADMIN — Medication 2000 MG: at 14:13

## 2023-08-29 RX ADMIN — GABAPENTIN 900 MG: 600 TABLET, FILM COATED ORAL at 12:50

## 2023-08-29 RX ADMIN — Medication 2000 MG: at 23:50

## 2023-08-29 RX ADMIN — HYDROMORPHONE HYDROCHLORIDE 0.6 MG: 2 INJECTION, SOLUTION INTRAMUSCULAR; INTRAVENOUS; SUBCUTANEOUS at 15:00

## 2023-08-29 RX ADMIN — FENTANYL CITRATE 50 MCG: 50 INJECTION, SOLUTION INTRAMUSCULAR; INTRAVENOUS at 14:55

## 2023-08-29 RX ADMIN — SODIUM CHLORIDE: 9 INJECTION, SOLUTION INTRAVENOUS at 16:36

## 2023-08-29 RX ADMIN — HYDROMORPHONE HYDROCHLORIDE 0.2 MG: 2 INJECTION, SOLUTION INTRAMUSCULAR; INTRAVENOUS; SUBCUTANEOUS at 15:12

## 2023-08-29 RX ADMIN — MIDAZOLAM 2 MG: 1 INJECTION INTRAMUSCULAR; INTRAVENOUS at 14:33

## 2023-08-29 RX ADMIN — TRANEXAMIC ACID 1000 MG: 100 INJECTION, SOLUTION INTRAVENOUS at 14:45

## 2023-08-29 RX ADMIN — LIDOCAINE HYDROCHLORIDE 10 ML: 20 INJECTION, SOLUTION EPIDURAL; INFILTRATION; INTRACAUDAL; PERINEURAL at 16:40

## 2023-08-29 RX ADMIN — SODIUM CHLORIDE: 9 INJECTION, SOLUTION INTRAVENOUS at 12:28

## 2023-08-29 RX ADMIN — SODIUM CHLORIDE, PRESERVATIVE FREE 10 ML: 5 INJECTION INTRAVENOUS at 23:55

## 2023-08-29 RX ADMIN — ROPIVACAINE HYDROCHLORIDE 20 ML: 5 INJECTION, SOLUTION EPIDURAL; INFILTRATION; PERINEURAL at 16:40

## 2023-08-29 RX ADMIN — LIDOCAINE HYDROCHLORIDE 1 ML: 10 INJECTION, SOLUTION EPIDURAL; INFILTRATION; INTRACAUDAL; PERINEURAL at 12:44

## 2023-08-29 RX ADMIN — HYDROMORPHONE HYDROCHLORIDE 0.4 MG: 2 INJECTION, SOLUTION INTRAMUSCULAR; INTRAVENOUS; SUBCUTANEOUS at 15:03

## 2023-08-29 RX ADMIN — PROPOFOL 200 MG: 10 INJECTION, EMULSION INTRAVENOUS at 14:23

## 2023-08-29 RX ADMIN — FENTANYL CITRATE 50 MCG: 50 INJECTION, SOLUTION INTRAMUSCULAR; INTRAVENOUS at 14:45

## 2023-08-29 RX ADMIN — ROCURONIUM BROMIDE 30 MG: 10 INJECTION, SOLUTION INTRAVENOUS at 14:57

## 2023-08-29 RX ADMIN — DEXAMETHASONE SODIUM PHOSPHATE 4 MG: 4 INJECTION INTRA-ARTICULAR; INTRALESIONAL; INTRAMUSCULAR; INTRAVENOUS; SOFT TISSUE at 16:40

## 2023-08-29 RX ADMIN — Medication 3792 UNITS: at 16:34

## 2023-08-29 RX ADMIN — LIDOCAINE HYDROCHLORIDE 80 MG: 20 INJECTION, SOLUTION EPIDURAL; INFILTRATION; INTRACAUDAL; PERINEURAL at 14:23

## 2023-08-29 RX ADMIN — ROCURONIUM BROMIDE 50 MG: 10 INJECTION, SOLUTION INTRAVENOUS at 14:27

## 2023-08-29 RX ADMIN — ONDANSETRON 4 MG: 2 INJECTION INTRAMUSCULAR; INTRAVENOUS at 15:30

## 2023-08-29 RX ADMIN — SODIUM CHLORIDE, POTASSIUM CHLORIDE, SODIUM LACTATE AND CALCIUM CHLORIDE: 600; 310; 30; 20 INJECTION, SOLUTION INTRAVENOUS at 23:54

## 2023-08-29 ASSESSMENT — LIFESTYLE VARIABLES: SMOKING_STATUS: 1

## 2023-08-29 ASSESSMENT — ENCOUNTER SYMPTOMS
SHORTNESS OF BREATH: 0
STRIDOR: 0

## 2023-08-29 ASSESSMENT — PAIN SCALES - GENERAL
PAINLEVEL_OUTOF10: 5
PAINLEVEL_OUTOF10: 6

## 2023-08-29 ASSESSMENT — PAIN DESCRIPTION - DESCRIPTORS
DESCRIPTORS: POUNDING
DESCRIPTORS: STABBING

## 2023-08-29 ASSESSMENT — PAIN DESCRIPTION - ORIENTATION: ORIENTATION: LEFT

## 2023-08-29 ASSESSMENT — PAIN DESCRIPTION - LOCATION: LOCATION: KNEE

## 2023-08-29 ASSESSMENT — PAIN - FUNCTIONAL ASSESSMENT: PAIN_FUNCTIONAL_ASSESSMENT: 0-10

## 2023-08-29 NOTE — OP NOTE
Operative Note      Patient: Colin Quiles  YOB: 1965  MRN: [id number]    Date of Procedure: 8/29/2023    Pre-Op Diagnosis Codes:     * Osteoarthritis of left knee, unspecified osteoarthritis type [M17.12]    Post-Op Diagnosis: Same       Procedure(s):  KNEE TOTAL ARTHROPLASTY    Surgeon(s):  Rg Escobar MD    Assistant:   Althea Multani CST    Anesthesia: General    Estimated Blood Loss (mL): Minimal    Complications: None    Specimens:   * No specimens in log *    Implants:  Implant Name Type Inv. Item Serial No.  Lot No. LRB No. Used Action   CEMENT BNE 40GM HI VISC RADPQ FOR REV SURG - [id number]  CEMENT BNE 40GM HI VISC RADPQ FOR REV SURG  [employer]- [id number] Left 2 Implanted   TRAY TIB L67MM KNEE CO CHROM FIN MOD INTLOK VANGUARD - [id number]  TRAY TIB L67MM KNEE CO CHROM FIN MOD INTLOK VANGUARD  [employer]- [id number] Left 1 Implanted   COMPONENT PAT USP02VA THK7. 8MM THN KNEE POLY 3 PEG SER A - [id number]  COMPONENT PAT KBP88CS THK7. 8MM THN KNEE POLY 3 PEG SER A  [employer]- [id number] Left 1 Implanted   COMPONENT FEM 60MM L KNEE CO CHROM NP INTLOK ALIVIA CRUCE RET - [id number]  COMPONENT FEM 60MM L KNEE CO CHROM NP INTLOK ALIVIA CRUCE RET  [employer]- [id number] Left 1 Implanted   BEARING TIB L67MM PAR27TU KNEE ARCM ANT STBL MOD COMPLT - [id number]  BEARING TIB L67MM PRW59IN KNEE ARCM ANT STBL MOD COMPLT  [employer]- [id number] Left 1 Implanted         Drains: * No LDAs found *    Findings: Severe DJD left knee    Description of procedure    Patient is a 65 y.o. female with a long standing history of left DJD of the knee. Patient has failed all types of conservative treatment included physical therapy, weight-loss counseling, NSAIDS, and injections. The patient has significant restriction of activities of daily living and/or work/job place abilities, and, therefore, has been counseled for TKA.  Patient is

## 2023-08-29 NOTE — ANESTHESIA PROCEDURE NOTES
Peripheral Block    Patient location during procedure: PACU  Reason for block: post-op pain management and at surgeon's request  Start time: 8/29/2023 4:40 PM  End time: 8/29/2023 4:44 PM  Staffing  Performed: anesthesiologist   Anesthesiologist: Emil Rivas MD  Preanesthetic Checklist  Completed: patient identified, IV checked, site marked, risks and benefits discussed, surgical/procedural consents, equipment checked, pre-op evaluation, timeout performed, anesthesia consent given, oxygen available, monitors applied/VS acknowledged, fire risk safety assessment completed and verbalized and blood product R/B/A discussed and consented  Peripheral Block   Patient position: supine  Prep: ChloraPrep  Provider prep: mask and sterile gloves  Patient monitoring: cardiac monitor, continuous pulse ox, frequent blood pressure checks, IV access, oxygen and responsive to questions  Block type: Saphenous  Laterality: left  Injection technique: single-shot  Guidance: ultrasound guided    Needle   Needle type: short-bevel   Needle gauge: 20 G  Needle localization: ultrasound guidance  Test dose: negative  Needle length: 10 cm  Assessment   Injection assessment: negative aspiration for heme, no paresthesia on injection, local visualized surrounding nerve on ultrasound and no intravascular symptoms  Paresthesia pain: none  Slow fractionated injection: yes  Hemodynamics: stable  Real-time US image taken/store: yes  Outcomes: uncomplicated    Medications Administered  dexamethasone (DECADRON) injection 4 mg/mL - Perineural   4 mg - 8/29/2023 4:40:00 PM  lidocaine 2 % (PF) injection - Perineural   10 mL - 8/29/2023 4:40:00 PM  ropivacaine 0.5% with epinephrine 1:[id number] injection (ANESTHESIA USE ONLY) (Mixture components: EPINEPHrine PF 1 MG/ML Soln, 0.005 mL; ropivacaine 0.5% Soln, 1 mL) - Perineural   20 mL - 8/29/2023 4:40:00 PM

## 2023-08-29 NOTE — INTERVAL H&P NOTE
Update History & Physical    The patient's History and Physical of August 15, 2023 was reviewed with the patient and I examined the patient. Any changes are as documented below. Here today for LEFT KNEE TOTAL ARTHROPLASTY per Dr. Lyn Rosa. Pt taking Bactrim related to cellulitis and open wound to buttocks. Pt reports taking as prescribed. Otherwise, no changes to source note. Pt AAO x 3 in NAD. HRRR. No adventitious lung sounds. No respiratory distress. NPO p MN. Took Keppra, Levothyroxine, this am with sip of water. Stopped celebrex and ibuprofen two weeks ago. Denies taking any other blood thinning medications. Denies recent or current chest pain/pressure, palpitations, SOB, recent URI, fever or chills. Review vitals per RN flowsheet.        Electronically signed by BRIAN Martinez CNP on 8/29/2023 at 11:49 AM

## 2023-08-29 NOTE — DISCHARGE INSTRUCTIONS
swelling or odor from incision site. Temperature above 101 degrees. Pain not controlled by prescribed medications. Calf tenderness, swelling, or redness. Shortness of breath or chest pain. If you cannot urinate and have been consuming liquids  Any incision or surgical-related concerns. Call surgeon with concerns PRIOR TO going to hospital.    Normal Conditions:  Swelling in the operative leg: this should reduce over time. Bruising behind the knee and around surgical area. Some post-operative pain. Constipation related to pain medications/decreased mobility. (Increase fiber & water intake.)   Slight warmth of operative leg. Fatigue and moderate pain after therapy. Numbness near the incision site. Nausea - take pain medications with food. Cut back on pain medication.     NOTE: Remember to go to follow-up orthopedic appointment with surgeon

## 2023-08-30 PROBLEM — M16.12 PRIMARY OSTEOARTHRITIS OF LEFT HIP: Status: ACTIVE | Noted: 2023-08-30

## 2023-08-30 LAB
HCT VFR BLD AUTO: 34 % (ref 36–46)
HGB BLD-MCNC: 10.9 G/DL (ref 12–16)

## 2023-08-30 PROCEDURE — 85018 HEMOGLOBIN: CPT

## 2023-08-30 PROCEDURE — 97110 THERAPEUTIC EXERCISES: CPT

## 2023-08-30 PROCEDURE — 99223 1ST HOSP IP/OBS HIGH 75: CPT | Performed by: INTERNAL MEDICINE

## 2023-08-30 PROCEDURE — 85014 HEMATOCRIT: CPT

## 2023-08-30 PROCEDURE — 97162 PT EVAL MOD COMPLEX 30 MIN: CPT

## 2023-08-30 PROCEDURE — 6370000000 HC RX 637 (ALT 250 FOR IP): Performed by: FAMILY MEDICINE

## 2023-08-30 PROCEDURE — 97530 THERAPEUTIC ACTIVITIES: CPT

## 2023-08-30 PROCEDURE — 6360000002 HC RX W HCPCS: Performed by: ORTHOPAEDIC SURGERY

## 2023-08-30 PROCEDURE — 97166 OT EVAL MOD COMPLEX 45 MIN: CPT

## 2023-08-30 PROCEDURE — 36415 COLL VENOUS BLD VENIPUNCTURE: CPT

## 2023-08-30 PROCEDURE — 1200000000 HC SEMI PRIVATE

## 2023-08-30 PROCEDURE — 97116 GAIT TRAINING THERAPY: CPT

## 2023-08-30 PROCEDURE — 85245 CLOT FACTOR VIII VW RISTOCTN: CPT

## 2023-08-30 PROCEDURE — 97535 SELF CARE MNGMENT TRAINING: CPT

## 2023-08-30 PROCEDURE — 6370000000 HC RX 637 (ALT 250 FOR IP): Performed by: ORTHOPAEDIC SURGERY

## 2023-08-30 PROCEDURE — 2580000003 HC RX 258: Performed by: ORTHOPAEDIC SURGERY

## 2023-08-30 PROCEDURE — 99223 1ST HOSP IP/OBS HIGH 75: CPT | Performed by: FAMILY MEDICINE

## 2023-08-30 PROCEDURE — 85246 CLOT FACTOR VIII VW ANTIGEN: CPT

## 2023-08-30 RX ORDER — CALCIUM CARBONATE 500 MG/1
500 TABLET, CHEWABLE ORAL 3 TIMES DAILY PRN
Status: DISCONTINUED | OUTPATIENT
Start: 2023-08-30 | End: 2023-09-01 | Stop reason: HOSPADM

## 2023-08-30 RX ADMIN — PANTOPRAZOLE SODIUM 40 MG: 40 TABLET, DELAYED RELEASE ORAL at 06:19

## 2023-08-30 RX ADMIN — ATORVASTATIN CALCIUM 20 MG: 20 TABLET, FILM COATED ORAL at 10:28

## 2023-08-30 RX ADMIN — LIOTHYRONINE SODIUM 5 MCG: 5 TABLET ORAL at 10:30

## 2023-08-30 RX ADMIN — NORTRIPTYLINE HYDROCHLORIDE 150 MG: 50 CAPSULE ORAL at 00:41

## 2023-08-30 RX ADMIN — TRANEXAMIC ACID 1300 MG: 650 TABLET ORAL at 00:37

## 2023-08-30 RX ADMIN — LEVETIRACETAM 500 MG: 500 TABLET, FILM COATED ORAL at 10:29

## 2023-08-30 RX ADMIN — LEVOTHYROXINE SODIUM 75 MCG: 0.07 TABLET ORAL at 06:19

## 2023-08-30 RX ADMIN — TRANEXAMIC ACID 1300 MG: 650 TABLET ORAL at 21:04

## 2023-08-30 RX ADMIN — Medication 400 MG: at 10:29

## 2023-08-30 RX ADMIN — SODIUM CHLORIDE, PRESERVATIVE FREE 10 ML: 5 INJECTION INTRAVENOUS at 20:11

## 2023-08-30 RX ADMIN — SODIUM CHLORIDE, POTASSIUM CHLORIDE, SODIUM LACTATE AND CALCIUM CHLORIDE: 600; 310; 30; 20 INJECTION, SOLUTION INTRAVENOUS at 12:40

## 2023-08-30 RX ADMIN — OXYCODONE HYDROCHLORIDE 10 MG: 10 TABLET ORAL at 20:10

## 2023-08-30 RX ADMIN — LEVETIRACETAM 500 MG: 500 TABLET, FILM COATED ORAL at 20:11

## 2023-08-30 RX ADMIN — Medication 3792 UNITS: at 04:11

## 2023-08-30 RX ADMIN — OXYCODONE HYDROCHLORIDE 10 MG: 10 TABLET ORAL at 15:07

## 2023-08-30 RX ADMIN — TRANEXAMIC ACID 1300 MG: 650 TABLET ORAL at 10:30

## 2023-08-30 RX ADMIN — NORTRIPTYLINE HYDROCHLORIDE 150 MG: 50 CAPSULE ORAL at 21:04

## 2023-08-30 RX ADMIN — HYDROMORPHONE HYDROCHLORIDE 0.5 MG: 1 INJECTION, SOLUTION INTRAMUSCULAR; INTRAVENOUS; SUBCUTANEOUS at 22:29

## 2023-08-30 RX ADMIN — MULTIPLE VITAMINS W/ MINERALS TAB 1 TABLET: TAB at 10:29

## 2023-08-30 RX ADMIN — HYDROMORPHONE HYDROCHLORIDE 0.5 MG: 1 INJECTION, SOLUTION INTRAMUSCULAR; INTRAVENOUS; SUBCUTANEOUS at 17:29

## 2023-08-30 RX ADMIN — ESCITALOPRAM OXALATE 20 MG: 20 TABLET ORAL at 10:35

## 2023-08-30 RX ADMIN — LEVETIRACETAM 500 MG: 500 TABLET, FILM COATED ORAL at 00:17

## 2023-08-30 RX ADMIN — LIOTHYRONINE SODIUM 5 MCG: 5 TABLET ORAL at 00:17

## 2023-08-30 RX ADMIN — TIZANIDINE 4 MG: 4 TABLET ORAL at 20:10

## 2023-08-30 RX ADMIN — Medication 3792 UNITS: at 15:52

## 2023-08-30 RX ADMIN — TRANEXAMIC ACID 1300 MG: 650 TABLET ORAL at 14:07

## 2023-08-30 RX ADMIN — LIOTHYRONINE SODIUM 5 MCG: 5 TABLET ORAL at 21:04

## 2023-08-30 RX ADMIN — ANTACID TABLETS 500 MG: 500 TABLET, CHEWABLE ORAL at 17:54

## 2023-08-30 RX ADMIN — POTASSIUM CHLORIDE 40 MEQ: 1500 TABLET, EXTENDED RELEASE ORAL at 10:29

## 2023-08-30 RX ADMIN — HYDROMORPHONE HYDROCHLORIDE 0.5 MG: 1 INJECTION, SOLUTION INTRAMUSCULAR; INTRAVENOUS; SUBCUTANEOUS at 14:03

## 2023-08-30 RX ADMIN — OXYCODONE HYDROCHLORIDE 10 MG: 10 TABLET ORAL at 10:29

## 2023-08-30 RX ADMIN — CYANOCOBALAMIN TAB 500 MCG 500 MCG: 500 TAB at 10:29

## 2023-08-30 ASSESSMENT — PAIN SCALES - GENERAL
PAINLEVEL_OUTOF10: 7
PAINLEVEL_OUTOF10: 10
PAINLEVEL_OUTOF10: 8
PAINLEVEL_OUTOF10: 8
PAINLEVEL_OUTOF10: 10
PAINLEVEL_OUTOF10: 9

## 2023-08-30 ASSESSMENT — PAIN DESCRIPTION - LOCATION
LOCATION: HEAD
LOCATION: HEAD
LOCATION: KNEE
LOCATION: HEAD

## 2023-08-30 ASSESSMENT — PAIN DESCRIPTION - DESCRIPTORS
DESCRIPTORS: ACHING
DESCRIPTORS: THROBBING
DESCRIPTORS: ACHING

## 2023-08-30 NOTE — CARE COORDINATION
Case Management Assessment  Initial Evaluation    Date/Time of Evaluation: 8/30/2023 9:49 AM  Assessment Completed by: Dale Gerardo RN    If patient is discharged prior to next notation, then this note serves as note for discharge by case management. Patient Name: Regina Jj                   YOB: 1965  Diagnosis: Osteoarthritis of left knee, unspecified osteoarthritis type [M17.12]  Primary osteoarthritis of left knee [M17.12]                   Date / Time: 8/29/2023 11:32 AM    Patient Admission Status: Inpatient   Readmission Risk (Low < 19, Mod (19-27), High > 27): Readmission Risk Score: 9.3    Current PCP: Elvia Ferrera MD  PCP verified by CM? Yes    Chart Reviewed: Yes      History Provided by: Patient  Patient Orientation: Alert and Oriented    Patient Cognition: Alert    Hospitalization in the last 30 days (Readmission):  No    If yes, Readmission Assessment in CM Navigator will be completed. Advance Directives:      Code Status: Full Code   Patient's Primary Decision Maker is:        Discharge Planning:    Patient lives with: Spouse/Significant Other Type of Home: House  Primary Care Giver: Self  Patient Support Systems include: Spouse/Significant Other, Children, Family Members   Current Financial resources: Medicare  Current community resources: None  Current services prior to admission: Durable Medical Equipment            Current DME: Walker            Type of Home Care services:  OT, PT, Nursing Services    ADLS  Prior functional level: Independent in ADLs/IADLs  Current functional level: Independent in ADLs/IADLs    PT AM-PAC:   /24  OT AM-PAC:   /24    Family can provide assistance at DC: Yes  Would you like Case Management to discuss the discharge plan with any other family members/significant others, and if so, who?  No  Plans to Return to Present Housing: Yes  Other Identified Issues/Barriers to RETURNING to current housing: no  Potential Assistance needed

## 2023-08-31 PROBLEM — D69.9 BLEEDING DIATHESIS (HCC): Status: ACTIVE | Noted: 2023-08-31

## 2023-08-31 PROBLEM — D68.01 VON WILLEBRAND DISEASE, TYPE 1 (HCC): Status: ACTIVE | Noted: 2023-08-31

## 2023-08-31 PROBLEM — D62 ACUTE BLOOD LOSS ANEMIA: Status: ACTIVE | Noted: 2023-08-31

## 2023-08-31 LAB
BASOPHILS # BLD: 0.1 K/UL (ref 0–0.2)
BASOPHILS NFR BLD: 1 % (ref 0–2)
EOSINOPHIL # BLD: 0 K/UL (ref 0–0.4)
EOSINOPHILS RELATIVE PERCENT: 0 % (ref 0–4)
ERYTHROCYTE [DISTWIDTH] IN BLOOD BY AUTOMATED COUNT: 16.8 % (ref 11.5–14.9)
HCT VFR BLD AUTO: 36.5 % (ref 36–46)
HGB BLD-MCNC: 11.6 G/DL (ref 12–16)
LYMPHOCYTES NFR BLD: 1.7 K/UL (ref 1–4.8)
LYMPHOCYTES RELATIVE PERCENT: 14 % (ref 24–44)
MCH RBC QN AUTO: 26.5 PG (ref 26–34)
MCHC RBC AUTO-ENTMCNC: 31.8 G/DL (ref 31–37)
MCV RBC AUTO: 83.4 FL (ref 80–100)
MONOCYTES NFR BLD: 0.9 K/UL (ref 0.1–1.3)
MONOCYTES NFR BLD: 8 % (ref 1–7)
NEUTROPHILS NFR BLD: 77 % (ref 36–66)
NEUTS SEG NFR BLD: 9.2 K/UL (ref 1.3–9.1)
PLATELET # BLD AUTO: 279 K/UL (ref 150–450)
PMV BLD AUTO: 9 FL (ref 6–12)
RBC # BLD AUTO: 4.38 M/UL (ref 4–5.2)
WBC OTHER # BLD: 11.9 K/UL (ref 3.5–11)

## 2023-08-31 PROCEDURE — 36415 COLL VENOUS BLD VENIPUNCTURE: CPT

## 2023-08-31 PROCEDURE — 85025 COMPLETE CBC W/AUTO DIFF WBC: CPT

## 2023-08-31 PROCEDURE — 6370000000 HC RX 637 (ALT 250 FOR IP): Performed by: ORTHOPAEDIC SURGERY

## 2023-08-31 PROCEDURE — 85245 CLOT FACTOR VIII VW RISTOCTN: CPT

## 2023-08-31 PROCEDURE — 1200000000 HC SEMI PRIVATE

## 2023-08-31 PROCEDURE — 99233 SBSQ HOSP IP/OBS HIGH 50: CPT | Performed by: INTERNAL MEDICINE

## 2023-08-31 PROCEDURE — 85240 CLOT FACTOR VIII AHG 1 STAGE: CPT

## 2023-08-31 PROCEDURE — 99232 SBSQ HOSP IP/OBS MODERATE 35: CPT | Performed by: FAMILY MEDICINE

## 2023-08-31 PROCEDURE — 2580000003 HC RX 258: Performed by: ORTHOPAEDIC SURGERY

## 2023-08-31 PROCEDURE — 97530 THERAPEUTIC ACTIVITIES: CPT

## 2023-08-31 PROCEDURE — 97116 GAIT TRAINING THERAPY: CPT

## 2023-08-31 PROCEDURE — 97110 THERAPEUTIC EXERCISES: CPT

## 2023-08-31 PROCEDURE — 6360000002 HC RX W HCPCS: Performed by: FAMILY MEDICINE

## 2023-08-31 PROCEDURE — 6360000002 HC RX W HCPCS: Performed by: ORTHOPAEDIC SURGERY

## 2023-08-31 RX ADMIN — OXYCODONE HYDROCHLORIDE 10 MG: 10 TABLET ORAL at 00:01

## 2023-08-31 RX ADMIN — SODIUM CHLORIDE, PRESERVATIVE FREE 10 ML: 5 INJECTION INTRAVENOUS at 20:53

## 2023-08-31 RX ADMIN — SODIUM CHLORIDE, PRESERVATIVE FREE 10 ML: 5 INJECTION INTRAVENOUS at 08:13

## 2023-08-31 RX ADMIN — Medication 3792 UNITS: at 04:11

## 2023-08-31 RX ADMIN — ANTIHEMOPHILIC FACTOR/VON WILLEBRAND FACTOR COMPLEX (HUMAN) 3820 UNITS: KIT at 16:32

## 2023-08-31 RX ADMIN — LIOTHYRONINE SODIUM 5 MCG: 5 TABLET ORAL at 08:10

## 2023-08-31 RX ADMIN — HYDROCHLOROTHIAZIDE 25 MG: 25 TABLET ORAL at 08:03

## 2023-08-31 RX ADMIN — PANTOPRAZOLE SODIUM 40 MG: 40 TABLET, DELAYED RELEASE ORAL at 06:06

## 2023-08-31 RX ADMIN — HYDROMORPHONE HYDROCHLORIDE 0.25 MG: 1 INJECTION, SOLUTION INTRAMUSCULAR; INTRAVENOUS; SUBCUTANEOUS at 17:31

## 2023-08-31 RX ADMIN — OXYCODONE HYDROCHLORIDE 10 MG: 10 TABLET ORAL at 04:17

## 2023-08-31 RX ADMIN — SPIRONOLACTONE 50 MG: 25 TABLET ORAL at 08:04

## 2023-08-31 RX ADMIN — TIZANIDINE 4 MG: 4 TABLET ORAL at 20:51

## 2023-08-31 RX ADMIN — Medication 400 MG: at 08:03

## 2023-08-31 RX ADMIN — LEVOTHYROXINE SODIUM 75 MCG: 0.07 TABLET ORAL at 06:06

## 2023-08-31 RX ADMIN — ATORVASTATIN CALCIUM 20 MG: 20 TABLET, FILM COATED ORAL at 08:03

## 2023-08-31 RX ADMIN — OXYCODONE HYDROCHLORIDE 10 MG: 10 TABLET ORAL at 20:52

## 2023-08-31 RX ADMIN — MULTIPLE VITAMINS W/ MINERALS TAB 1 TABLET: TAB at 08:04

## 2023-08-31 RX ADMIN — ESCITALOPRAM OXALATE 20 MG: 20 TABLET ORAL at 08:04

## 2023-08-31 RX ADMIN — SODIUM CHLORIDE, PRESERVATIVE FREE 10 ML: 5 INJECTION INTRAVENOUS at 08:42

## 2023-08-31 RX ADMIN — NORTRIPTYLINE HYDROCHLORIDE 150 MG: 50 CAPSULE ORAL at 20:51

## 2023-08-31 RX ADMIN — TRANEXAMIC ACID 1300 MG: 650 TABLET ORAL at 20:52

## 2023-08-31 RX ADMIN — CYANOCOBALAMIN TAB 500 MCG 500 MCG: 500 TAB at 08:04

## 2023-08-31 RX ADMIN — POTASSIUM CHLORIDE 40 MEQ: 1500 TABLET, EXTENDED RELEASE ORAL at 08:04

## 2023-08-31 RX ADMIN — HYDROMORPHONE HYDROCHLORIDE 0.5 MG: 1 INJECTION, SOLUTION INTRAMUSCULAR; INTRAVENOUS; SUBCUTANEOUS at 02:35

## 2023-08-31 RX ADMIN — LIOTHYRONINE SODIUM 5 MCG: 5 TABLET ORAL at 20:51

## 2023-08-31 RX ADMIN — LEVETIRACETAM 500 MG: 500 TABLET, FILM COATED ORAL at 20:53

## 2023-08-31 RX ADMIN — HYDROMORPHONE HYDROCHLORIDE 0.5 MG: 1 INJECTION, SOLUTION INTRAMUSCULAR; INTRAVENOUS; SUBCUTANEOUS at 08:05

## 2023-08-31 RX ADMIN — LEVETIRACETAM 500 MG: 500 TABLET, FILM COATED ORAL at 08:04

## 2023-08-31 ASSESSMENT — PAIN SCALES - GENERAL
PAINLEVEL_OUTOF10: 9
PAINLEVEL_OUTOF10: 8
PAINLEVEL_OUTOF10: 8
PAINLEVEL_OUTOF10: 7
PAINLEVEL_OUTOF10: 9
PAINLEVEL_OUTOF10: 9
PAINLEVEL_OUTOF10: 0
PAINLEVEL_OUTOF10: 8
PAINLEVEL_OUTOF10: 9

## 2023-08-31 ASSESSMENT — PAIN DESCRIPTION - LOCATION
LOCATION: KNEE;HEAD
LOCATION: HEAD
LOCATION: HEAD;KNEE
LOCATION: HEAD;KNEE
LOCATION: HEAD

## 2023-08-31 ASSESSMENT — PAIN DESCRIPTION - DESCRIPTORS
DESCRIPTORS: ACHING
DESCRIPTORS: SHARP

## 2023-08-31 ASSESSMENT — PAIN DESCRIPTION - ORIENTATION
ORIENTATION: LEFT
ORIENTATION: LEFT

## 2023-08-31 NOTE — CARE COORDINATION
ONGOING DISCHARGE PLAN:    Patient is alert and oriented x4. Spoke with patient regarding discharge plan and patient confirms that plan is still home with [facility] - [facility]. POD#2 left total knee arthroplasty. Anticipate d/c home tomorrow. Will continue to follow for additional discharge needs. If patient is discharged prior to next notation, then this note serves as note for discharge by case management.     Electronically signed by Silva Shaw RN on 8/31/2023 at 12:23 PM

## 2023-09-01 VITALS
BODY MASS INDEX: 34.02 KG/M2 | HEART RATE: 97 BPM | HEIGHT: 63 IN | TEMPERATURE: 97.5 F | RESPIRATION RATE: 16 BRPM | SYSTOLIC BLOOD PRESSURE: 129 MMHG | DIASTOLIC BLOOD PRESSURE: 71 MMHG | WEIGHT: 192 LBS | OXYGEN SATURATION: 98 %

## 2023-09-01 LAB
VWF AG ACT/NOR PPP IA: 298 % (ref 52–214)
VWF:RCO ACT/NOR PPP PL AGG: 286 % (ref 51–215)

## 2023-09-01 PROCEDURE — 97530 THERAPEUTIC ACTIVITIES: CPT

## 2023-09-01 PROCEDURE — 6360000002 HC RX W HCPCS: Performed by: FAMILY MEDICINE

## 2023-09-01 PROCEDURE — 6370000000 HC RX 637 (ALT 250 FOR IP): Performed by: ORTHOPAEDIC SURGERY

## 2023-09-01 PROCEDURE — 99232 SBSQ HOSP IP/OBS MODERATE 35: CPT | Performed by: FAMILY MEDICINE

## 2023-09-01 PROCEDURE — 36415 COLL VENOUS BLD VENIPUNCTURE: CPT

## 2023-09-01 PROCEDURE — 2580000003 HC RX 258: Performed by: ORTHOPAEDIC SURGERY

## 2023-09-01 PROCEDURE — 97116 GAIT TRAINING THERAPY: CPT

## 2023-09-01 RX ADMIN — LEVETIRACETAM 500 MG: 500 TABLET, FILM COATED ORAL at 09:58

## 2023-09-01 RX ADMIN — OXYCODONE HYDROCHLORIDE 10 MG: 10 TABLET ORAL at 02:31

## 2023-09-01 RX ADMIN — PANTOPRAZOLE SODIUM 40 MG: 40 TABLET, DELAYED RELEASE ORAL at 10:52

## 2023-09-01 RX ADMIN — TRANEXAMIC ACID 1300 MG: 650 TABLET ORAL at 09:59

## 2023-09-01 RX ADMIN — SPIRONOLACTONE 50 MG: 25 TABLET ORAL at 09:58

## 2023-09-01 RX ADMIN — OXYCODONE HYDROCHLORIDE 10 MG: 10 TABLET ORAL at 12:25

## 2023-09-01 RX ADMIN — TRANEXAMIC ACID 1300 MG: 650 TABLET ORAL at 13:49

## 2023-09-01 RX ADMIN — HYDROMORPHONE HYDROCHLORIDE 0.25 MG: 1 INJECTION, SOLUTION INTRAMUSCULAR; INTRAVENOUS; SUBCUTANEOUS at 13:49

## 2023-09-01 RX ADMIN — MULTIPLE VITAMINS W/ MINERALS TAB 1 TABLET: TAB at 09:58

## 2023-09-01 RX ADMIN — LIOTHYRONINE SODIUM 5 MCG: 5 TABLET ORAL at 10:52

## 2023-09-01 RX ADMIN — HYDROCHLOROTHIAZIDE 25 MG: 25 TABLET ORAL at 09:58

## 2023-09-01 RX ADMIN — OXYCODONE HYDROCHLORIDE 10 MG: 10 TABLET ORAL at 08:13

## 2023-09-01 RX ADMIN — LEVOTHYROXINE SODIUM 75 MCG: 0.07 TABLET ORAL at 08:14

## 2023-09-01 RX ADMIN — ATORVASTATIN CALCIUM 20 MG: 20 TABLET, FILM COATED ORAL at 09:58

## 2023-09-01 RX ADMIN — ESCITALOPRAM OXALATE 20 MG: 20 TABLET ORAL at 10:52

## 2023-09-01 RX ADMIN — SODIUM CHLORIDE, PRESERVATIVE FREE 10 ML: 5 INJECTION INTRAVENOUS at 10:00

## 2023-09-01 RX ADMIN — HYDROMORPHONE HYDROCHLORIDE 0.25 MG: 1 INJECTION, SOLUTION INTRAMUSCULAR; INTRAVENOUS; SUBCUTANEOUS at 00:54

## 2023-09-01 RX ADMIN — HYDROMORPHONE HYDROCHLORIDE 0.25 MG: 1 INJECTION, SOLUTION INTRAMUSCULAR; INTRAVENOUS; SUBCUTANEOUS at 09:55

## 2023-09-01 RX ADMIN — Medication 400 MG: at 09:58

## 2023-09-01 RX ADMIN — POTASSIUM CHLORIDE 40 MEQ: 1500 TABLET, EXTENDED RELEASE ORAL at 09:58

## 2023-09-01 RX ADMIN — CYANOCOBALAMIN TAB 500 MCG 500 MCG: 500 TAB at 09:58

## 2023-09-01 ASSESSMENT — PAIN SCALES - GENERAL
PAINLEVEL_OUTOF10: 9
PAINLEVEL_OUTOF10: 9
PAINLEVEL_OUTOF10: 8
PAINLEVEL_OUTOF10: 9
PAINLEVEL_OUTOF10: 9

## 2023-09-01 ASSESSMENT — PAIN DESCRIPTION - LOCATION
LOCATION: HEAD;KNEE
LOCATION: HEAD

## 2023-09-01 ASSESSMENT — PAIN DESCRIPTION - DESCRIPTORS
DESCRIPTORS: ACHING

## 2023-09-01 ASSESSMENT — PAIN DESCRIPTION - ORIENTATION
ORIENTATION: ANTERIOR
ORIENTATION: ANTERIOR
ORIENTATION: LEFT

## 2023-09-01 NOTE — DISCHARGE INSTR - DIET

## 2023-09-01 NOTE — CONSULTS
Family Medicine Consult Note    Late entry on patient seen at 1010      PCP: Lenny Whiteside MD    Date of Admission: 8/29/2023    Date of Service: Pt seen/examined on 8/30/2023 and Admitted to Inpatient    Chief Complaint:  OA left knee      History Of Present Illness: The patient is a 65 y.o. female who presents to [facility] with OA of left knee for knee replacement. She has known von Willebrand's disease.          Past Medical History:        Diagnosis Date    Acid indigestion     hx of    Anesthesia      difficult intubation due to cranio cervical fusion (intubation by fiber optic of glidescope (Dr. 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 of cranio-cervical fusion , intubation by fiber optic glidescopy    Elevated liver enzymes 09/2016    Fibromyalgia     GERD (gastroesophageal reflux disease)     Headache     Hepatic steatosis     [place] (hard of hearing)     noel. 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 Chiara An    Wears glasses        Past Surgical History:        Procedure Laterality Date    APPENDECTOMY      BACK SURGERY      lumbar fusion    BRAIN SURGERY      3 brain surgeries    BREAST REDUCTION SURGERY      CARPAL TUNNEL RELEASE Right     CERVICAL FUSION  2015    plate placed, difficult intubation    CHOLECYSTECTOMY      COLONOSCOPY      DILATION AND CURETTAGE OF UTERUS      few times    HYSTERECTOMY (CERVIX STATUS UNKNOWN)      KNEE ARTHROSCOPY Bilateral
Today's Date: 8/31/2023  Patient Name: Kendra Leigh  Date of admission: 8/29/2023 11:32 AM  Patient's age: 65 y. o., 1965  Admission Dx: Osteoarthritis of left knee, unspecified osteoarthritis type [M17.12]  Primary osteoarthritis of left knee [M17.12]  Primary osteoarthritis of left hip [M16.12]    Reason for Consult: Kirstie White  Requesting Physician: Ryley Lawrence MD    CHIEF COMPLAINT: Osteoarthritis of the left knee status post total knee arthroplasty    History Obtained From:  patient    Subjective  Patient had dose of Dilaudid  She questioning about her hematologist to be on the case  Did explain to her that I am covering her hematologist while she is in the hospital  Hemophilia work-up not back  No evidence of excessive bleeding    HISTORY OF PRESENT ILLNESS:      The patient is a 65 y.o.  female who is admitted to the hospital for total knee arthroplasty due to severe osteoarthritis of the left knee, patient had type I von Willebrand disease, she did have right total knee arthroplasty back in August 2020 and reported no excessive bleeding with treatment, patient underwent a procedure on August 29, 2023 and there was no excessive bleed however hemoglobin down from 13.3-10.9 after surgery  Patient given humate P and  Lysteda       Past Medical History:   has a past medical history of Acid indigestion, Anesthesia, Cancer ([address]), Chiari I malformation ([address]), Chronic back pain, Difficult intubation, Elevated liver enzymes, Fibromyalgia, GERD (gastroesophageal reflux disease), Headache, Hepatic steatosis, [place] (hard of hearing), Hypoxia, Kidney stones, Liver disease, Obesity, Osteoarthritis, Polyarthritis rheumatica ([address]), PONV (postoperative nausea and vomiting), Seizures ([address]), Thyroid disease, Urinary incontinence, UTI (urinary tract infection), Von Willebrand disease ([address]), and Wears glasses.     Past Surgical History:   has a past surgical history that includes Hysterectomy; brain
tenderness, deformity or swelling   Extremities - peripheral pulses normal, no pedal edema, no clubbing or cyanosis   Skin - normal coloration and turgor, no rashes, no suspicious skin lesions noted ,    DATA:    Labs:   CBC:   Recent Labs     08/30/23  0711   HGB 10.9*   HCT 34.0*     BMP: No results for input(s): NA, K, CO2, BUN, CREATININE, LABGLOM, GLUCOSE in the last 72 hours. PT/INR: No results for input(s): PROTIME, INR in the last 72 hours. IMAGING DATA:  XR KNEE LEFT (1-2 VIEWS)   Final Result   Knee arthroplasty. No acute abnormalities. Primary Problem  Primary osteoarthritis of left knee    Active Hospital Problems    Diagnosis Date Noted    Primary osteoarthritis of left knee [M17.12] 08/29/2023         IMPRESSION:   Type I von Willebrand disease  Acute anemia status   Left knee arthritis status post total knee arthroplasty    RECOMMENDATIONS:  I personally reviewed results of lab work-up imaging studies,outside records and other relevant clinical data. I had a detailed discussion with the patient. I explained the significance of these abnormalities and possible etiology and management options   28-year-old woman history of type I von Willebrand disease status post left knee arthritis done on August 29, 2023 with hemoglobin down from 13-10.9 after surgery, patient given humate P(von Willebrand concentrate) will monitor von Willebrand activity factor VIII activity and CBC daily, to keep ristocetin cofactor level above 50% ;usually the treatment continue 3- 5 days after surgery or more based on clinical picture/labs  Continue Lysteda for 5 days at the current dose  Coagulation studies including factor VIII  Monitor H&H closely to be done every 8 hours x 3 transfuse to keep hemoglobin above 7  will follow-up the patient    Thank you for the consult      Discussed with patient and Nurse. Thank you for asking us to see this patient.                                     Lucía Schaffer MD

## 2023-09-01 NOTE — PLAN OF CARE
Problem: Discharge Planning  Goal: Discharge to home or other facility with appropriate resources  9/1/2023 0244 by Gisell Baez RN  Outcome: Progressing  8/31/2023 1529 by Norma Medina RN  Outcome: Progressing     Problem: Chronic Conditions and Co-morbidities  Goal: Patient's chronic conditions and co-morbidity symptoms are monitored and maintained or improved  9/1/2023 0244 by Gisell Baez RN  Outcome: Progressing  8/31/2023 1529 by Norma Medina RN  Outcome: Progressing     Problem: Pain  Goal: Verbalizes/displays adequate comfort level or baseline comfort level  9/1/2023 0244 by Gisell Baez RN  Outcome: Progressing  8/31/2023 1529 by Norma Medina RN  Outcome: Progressing     Problem: Safety - Adult  Goal: Free from fall injury  9/1/2023 0244 by Gisell Baez RN  Outcome: Progressing  8/31/2023 1529 by Norma Medina RN  Outcome: Progressing     Problem: Skin/Tissue Integrity  Goal: Absence of new skin breakdown  Description: 1. Monitor for areas of redness and/or skin breakdown  2. Assess vascular access sites hourly  3. Every 4-6 hours minimum:  Change oxygen saturation probe site  4. Every 4-6 hours:  If on nasal continuous positive airway pressure, respiratory therapy assess nares and determine need for appliance change or resting period.   9/1/2023 0244 by Gisell Baez RN  Outcome: Progressing  8/31/2023 1529 by Norma Medina RN  Outcome: Progressing     Problem: ABCDS Injury Assessment  Goal: Absence of physical injury  9/1/2023 0244 by Gisell Baez RN  Outcome: Progressing  8/31/2023 1529 by Norma Medina RN  Outcome: Progressing
Problem: Discharge Planning  Goal: Discharge to home or other facility with appropriate resources  9/1/2023 1603 by Emilie Chavez RN  Outcome: Progressing     Problem: Chronic Conditions and Co-morbidities  Goal: Patient's chronic conditions and co-morbidity symptoms are monitored and maintained or improved  9/1/2023 1603 by Emilie Chavez RN  Outcome: Progressing     Problem: Pain  Goal: Verbalizes/displays adequate comfort level or baseline comfort level  9/1/2023 1603 by Emilie Chavez RN  Outcome: Progressing     Problem: Safety - Adult  Goal: Free from fall injury  9/1/2023 1603 by Emilie Chavez RN  Outcome: Progressing
Problem: Discharge Planning  Goal: Discharge to home or other facility with appropriate resources  Outcome: Progressing  Flowsheets (Taken 8/30/2023 8710)  Discharge to home or other facility with appropriate resources: Identify barriers to discharge with patient and caregiver     Problem: Pain  Goal: Verbalizes/displays adequate comfort level or baseline comfort level  Outcome: Progressing     Problem: Safety - Adult  Goal: Free from fall injury  Outcome: Progressing  Flowsheets (Taken 8/30/2023 0178)  Free From Fall Injury: Instruct family/caregiver on patient safety   ID band on. Nonslip socks provided. Bed in low position and hourly rounding in place.
Problem: Pain  Goal: Verbalizes/displays adequate comfort level or baseline comfort level  8/30/2023 1603 by Noel Mendoza RN  Outcome: Progressing  Note: Patient expresses relief following administration of prn pain medication. Problem: Safety - Adult  Goal: Free from fall injury  8/30/2023 1603 by Noel Mendoza RN  Outcome: Progressing  Note: Patient remains free of incidence/ injury. Bed remains in low position. Side rails up x2. Problem: Skin/Tissue Integrity  Goal: Absence of new skin breakdown  Description: 1. Monitor for areas of redness and/or skin breakdown  2. Assess vascular access sites hourly  3. Every 4-6 hours minimum:  Change oxygen saturation probe site  4. Every 4-6 hours:  If on nasal continuous positive airway pressure, respiratory therapy assess nares and determine need for appliance change or resting period. Outcome: Progressing  Note: No new occurrence of skin breakdown noted during this shift.
change or resting period.   Outcome: Progressing     Problem: ABCDS Injury Assessment  Goal: Absence of physical injury  Outcome: Progressing  Flowsheets (Taken 8/30/2023 0624 by Kishan Chris RN)  Absence of Physical Injury: Implement safety measures based on patient assessment

## 2023-09-01 NOTE — CARE COORDINATION
ONGOING DISCHARGE PLAN:     Patient is alert and oriented x4. Spoke with patient regarding discharge plan and patient confirms that plan is still home with [facility]. Notified Deepti from [facility] that pt will be discharged home today. [facility] will pull JAVIER and d/c med list from [address] [place] [address]. POD#3 left total knee arthroplasty. Discharge home today after last dose of IV Humate. Will continue to follow for additional discharge needs.      If patient is discharged prior to next notation, then this note serves as note for discharge by case management    Electronically signed by Rocio Siu RN on 9/1/2023 at 12:09 PM

## 2023-09-03 LAB — VWF:RCO ACT/NOR PPP PL AGG: 278 % (ref 51–215)

## 2023-09-05 ENCOUNTER — CARE COORDINATION (OUTPATIENT)
Dept: CASE MANAGEMENT | Age: 58
End: 2023-09-05

## 2023-09-05 DIAGNOSIS — M17.12 PRIMARY OSTEOARTHRITIS OF LEFT KNEE: Primary | ICD-10-CM

## 2023-09-05 PROCEDURE — 1111F DSCHRG MED/CURRENT MED MERGE: CPT | Performed by: FAMILY MEDICINE

## 2023-09-05 NOTE — CARE COORDINATION
[facility] Transitions Initial Follow Up Call    Call within 2 business days of discharge: Yes    Patient Current Location:  Home: [address] [place] [id number]    LPN Care Coordinator contacted the patient by telephone to perform post hospital discharge assessment. Verified name and  with patient as identifiers. Provided introduction to self, and explanation of the LPN Care Coordinator role. Patient: Cherry Wynn Patient : 1965   MRN: [id number]  Reason for Admission: primary osteoarthritis of left knee  Discharge Date: 23 RARS: Readmission Risk Score: 7.9      Last Discharge [address] [facility] [facility]       Date Complaint Diagnosis Description Type Department Provider    23  Primary osteoarthritis of left knee Admission (Discharged) Trinidad Orellana MD            Was this an external facility discharge? No Discharge Facility: [facility] to be reviewed by the provider   Additional needs identified to be addressed with provider: No  None                Method of communication with provider: none. Transitions of Care Initial Call:  Explained the role of Care Transition Nurse and the Transition program, patient is agreeable to follow up calls [facility] today she is doing okay. She has [facility] called and spoke to [place]. SOC was Saturday. She reports that she has just a little pain she has been stretching her leg. She denies HA cp dizziness nausea vomiting or diarrhea. She is using a walker to ambulate. Appetite good. B/B good. Bandage to Lt leg intact no s/s of infection denies fever or chills. Discussed high protein diet to promote wound healing, keep wound clean and dry. She has a aunt that will be coming to assist with her care. She is retired was a .   Discussed PCP follow stated her pcp said she could keep her  already scheduled appointment 10/20  Discussed post op appointment family will transport

## 2023-09-07 LAB — FACTOR VIII ACTIVITY: 101 % (ref 50–150)

## 2023-09-12 ENCOUNTER — CARE COORDINATION (OUTPATIENT)
Dept: CASE MANAGEMENT | Age: 58
End: 2023-09-12

## 2023-09-12 NOTE — CARE COORDINATION
appropriate site of care based on symptoms and resources available to patient including: PCP  Specialist. The patient agrees to contact the PCP office for questions related to their healthcare. Advance Care Planning:   not on file. Patients top risk factors for readmission: medical condition-TKA  Interventions to address risk factors: Obtained and reviewed discharge summary and/or continuity of care documents    Offered patient enrollment in the Remote Patient Monitoring (RPM) program for in-home monitoring: Patient is not eligible for RPM program.     Care Transitions Subsequent and Final Call    Schedule Follow Up Appointment with PCP: Completed  Subsequent and Final Calls  Do you have any ongoing symptoms?: Yes  Onset of Patient-reported symptoms: In the past 7 days  Patient-reported symptoms: Pain, Other  Have your medications changed?: No  Do you have any questions related to your medications?: No  Do you currently have any active services?: Yes  Are you currently active with any services?: Home Health  Do you have any needs or concerns that I can assist you with?: No  Identified Barriers: Lack of Education  Care Transitions Interventions                          Other Interventions:             Care Transition Nurse provided contact information for future needs. Plan for follow-up call in 5-7 days based on severity of symptoms and risk factors. Plan for next call:  Review notes from surgeon office, see how therapy is going, how is knee looking?     Bruce Dixon RN

## 2023-09-13 ENCOUNTER — OFFICE VISIT (OUTPATIENT)
Dept: ORTHOPEDIC SURGERY | Age: 58
End: 2023-09-13

## 2023-09-13 DIAGNOSIS — Z96.652 STATUS POST TOTAL LEFT KNEE REPLACEMENT: Primary | ICD-10-CM

## 2023-09-13 PROCEDURE — 99024 POSTOP FOLLOW-UP VISIT: CPT | Performed by: ORTHOPAEDIC SURGERY

## 2023-09-13 NOTE — PROGRESS NOTES
Brenda Hui M.D.            [address] [facility], [address] [facility], [address] [place] [address] [address]           Dept Phone: [phone]           Dept Fax:  [address]          Dept Phone: [phone]           Dept Fax:  [phone]      Chief Compliant:  Chief Complaint   Patient presents with    [address] TKA 8/29/23        History of Present Illness:  Patient returns today status post left TKA times 2 weeks. Patient has no major complaints     Review of Systems   Constitutional: Negative for fever, chills, sweats, recent injury, recent illness  Neurological: Negative for Headaches, numbness, or weakness. Integumentary: Negative for rash, itching, ecchymosis, or wounds. Musculoskeletal: Positive for Post-Op Check (Lt TKA 8/29/23)       Physical Exam:  Constitutional: Patient is oriented to person, place, and time. Patient appears well-developed and well nourished. Musculoskeletal: Normal gait. Motion 0-100 degrees with expected pain with ROM. No Calf tenderness, Negative Karmen's sign. Neurovascular intact. Neurological: Patient is alert and oriented to person, place, and time. Normal strenght. No sensory deficit. Skin: Skin is warm and dry. Incision is healing well without signs of redness or drainage  Nursing note and vitals reviewed. Labs and Imaging:     XR taken today:  XR KNEE LEFT (1-2 VIEWS)    Result Date: 9/13/2023  X-rays taken today reviewed by me show standing AP both knees lateral left knee. Patient is status post a recent left total arthroplasty components appear in X alignment position on AP and lateral views without any periprosthetic complications the lateral view patellar height is appropriate as well without complications.   Patient also has a previous right total knee arthroplasty looks good          Orders

## 2023-09-18 ENCOUNTER — CARE COORDINATION (OUTPATIENT)
Dept: CASE MANAGEMENT | Age: 58
End: 2023-09-18

## 2023-09-19 ENCOUNTER — CARE COORDINATION (OUTPATIENT)
Dept: CASE MANAGEMENT | Age: 58
End: 2023-09-19

## 2023-09-19 NOTE — CARE COORDINATION
Care Transitions Outreach Attempt    Call within 2 business days of discharge: Yes   Attempted to reach patient for transitions of care follow up. Unable to reach patient. Final attempt to reach. Patient had follow up with surgeon last week, was doing well post operatively. Left message with contact information and request for call back. Patient: Sarah Terry Patient : 1965 MRN: [id number]    Last Discharge [address]       Date Complaint Diagnosis Description Type Department Provider    23  Primary osteoarthritis of left knee Admission (Discharged) Ange Madrid MD              Was this an external facility discharge?  No Discharge Facility: [facility]    Noted following upcoming appointments from discharge chart review:   [facility] follow up appointment(s):   Future Appointments   Date Time Provider [facility] [address]   10/20/2023 10:40 AM Jasmina Palomino MD [address]   10/25/2023  8:50 AM Dionte Gibbs MD [facility]   2024 10:00 AM Jasmina Palomino MD [address]     Non-[facility] follow up appointment(s): n/a

## 2023-10-24 ENCOUNTER — TELEPHONE (OUTPATIENT)
Dept: PHARMACY | Facility: CLINIC | Age: 58
End: 2023-10-24

## 2023-10-24 NOTE — TELEPHONE ENCOUNTER
[address] recent visits within past 540 days with a meds authorizing provider and meeting all other requirements  Future Appointments  Date Type Provider Dept   24 Appointment Belkis Diaz  [address] [facility]   Showing future appointments within next 150 days with a meds authorizing provider and meeting all other requirements    Future Appointments   Date Time Provider [address]   10/27/2023 11:15 AM Mary Thibodeaux MD [facility]   2024 10:00 AM Belkis Diaz  [address] // Department, toll free [phone], Option 3    For Pharmacy Admin Tracking Only    Program: Kimberley in place:  No  Recommendation Provided To: Pharmacy: 1  Intervention Detail: Adherence Monitorin  Intervention Accepted By: Pharmacy: 1  Gap Closed?: No   Time Spent (min): 10

## 2023-10-27 ENCOUNTER — OFFICE VISIT (OUTPATIENT)
Dept: ORTHOPEDIC SURGERY | Age: 58
End: 2023-10-27

## 2023-10-27 DIAGNOSIS — Z96.652 STATUS POST TOTAL LEFT KNEE REPLACEMENT: Primary | ICD-10-CM

## 2023-10-27 PROCEDURE — 99024 POSTOP FOLLOW-UP VISIT: CPT | Performed by: ORTHOPAEDIC SURGERY

## 2023-10-27 NOTE — PROGRESS NOTES
Monet returns today status post left total knee on 8/29/2023 she says her knee feels great she said this knee recovery was much quicker than her last on the right side    Examination notes her wound is pristine motion is 0 to 120 degrees has good varus valgus stability and good patellar tracking. No new x-rays taken today    Impression  Status post left total knee on 8/29/2023  Previous right total knee arthroplasty    Plan  Patient is already discharged from physical therapy. She is doing very well with this on her own. She is very happy how it feels.   We will see her back here in August 2024 call if she has any problems prior to that time

## 2024-01-12 ENCOUNTER — HOSPITAL ENCOUNTER (OUTPATIENT)
Age: 59
Setting detail: SPECIMEN
Discharge: HOME | End: 2024-01-12

## 2024-01-12 LAB
CREAT UR-MCNC: 81 MG/DL (ref 28–217)
MICROALBUMIN UR-MCNC: <12 MG/L
MICROALBUMIN/CREAT UR-RTO: NORMAL MCG/MG CREAT

## 2024-06-20 ENCOUNTER — TELEPHONE (OUTPATIENT)
Dept: UROLOGY | Age: 59
End: 2024-06-20

## 2024-06-20 ENCOUNTER — OFFICE VISIT (OUTPATIENT)
Dept: UROLOGY | Age: 59
End: 2024-06-20
Payer: MEDICARE

## 2024-06-20 VITALS
TEMPERATURE: 97.4 F | OXYGEN SATURATION: 93 % | RESPIRATION RATE: 16 BRPM | HEIGHT: 63 IN | HEART RATE: 97 BPM | WEIGHT: 212 LBS | DIASTOLIC BLOOD PRESSURE: 86 MMHG | SYSTOLIC BLOOD PRESSURE: 132 MMHG | BODY MASS INDEX: 37.56 KG/M2

## 2024-06-20 DIAGNOSIS — Z87.442 HISTORY OF KIDNEY STONES: ICD-10-CM

## 2024-06-20 DIAGNOSIS — R39.11 URINARY HESITANCY: ICD-10-CM

## 2024-06-20 DIAGNOSIS — N39.0 RECURRENT UTI: ICD-10-CM

## 2024-06-20 DIAGNOSIS — R31.0 GROSS HEMATURIA: Primary | ICD-10-CM

## 2024-06-20 LAB
APPEARANCE FLUID: CLEAR
BILIRUBIN, POC: NORMAL
BLOOD URINE, POC: NORMAL
CLARITY, POC: NORMAL
COLOR, POC: YELLOW
GLUCOSE URINE, POC: NORMAL
KETONES, POC: NORMAL
LEUKOCYTE EST, POC: NORMAL
NITRITE, POC: NORMAL
PH, POC: NORMAL
PROTEIN, POC: NORMAL
SPECIFIC GRAVITY, POC: NORMAL
UROBILINOGEN, POC: NORMAL

## 2024-06-20 PROCEDURE — G8427 DOCREV CUR MEDS BY ELIG CLIN: HCPCS | Performed by: NURSE PRACTITIONER

## 2024-06-20 PROCEDURE — G8417 CALC BMI ABV UP PARAM F/U: HCPCS | Performed by: NURSE PRACTITIONER

## 2024-06-20 PROCEDURE — 3017F COLORECTAL CA SCREEN DOC REV: CPT | Performed by: NURSE PRACTITIONER

## 2024-06-20 PROCEDURE — 81002 URINALYSIS NONAUTO W/O SCOPE: CPT | Performed by: NURSE PRACTITIONER

## 2024-06-20 PROCEDURE — 99214 OFFICE O/P EST MOD 30 MIN: CPT | Performed by: NURSE PRACTITIONER

## 2024-06-20 PROCEDURE — 4004F PT TOBACCO SCREEN RCVD TLK: CPT | Performed by: NURSE PRACTITIONER

## 2024-06-20 ASSESSMENT — ENCOUNTER SYMPTOMS
NAUSEA: 0
VOMITING: 0
EYE PAIN: 0
ABDOMINAL PAIN: 0
COUGH: 0
WHEEZING: 0
SHORTNESS OF BREATH: 0
EYE REDNESS: 0
BACK PAIN: 0
CONSTIPATION: 0

## 2024-06-20 NOTE — PROGRESS NOTES
Review of Systems   Constitutional:  Negative for chills, fatigue and fever.   Eyes:  Negative for pain, redness and visual disturbance.   Respiratory:  Negative for cough, shortness of breath and wheezing.    Cardiovascular:  Negative for chest pain and leg swelling.   Gastrointestinal:  Negative for abdominal pain, constipation, nausea and vomiting.   Genitourinary:  Positive for difficulty urinating (10 mins, push down on my bladder), dysuria (sometimes) and frequency. Negative for flank pain, hematuria and urgency.   Musculoskeletal:  Negative for back pain, joint swelling and myalgias.   Skin:  Negative for rash and wound.   Neurological:  Negative for dizziness, tremors and numbness.   Hematological:  Does not bruise/bleed easily.     
week 9 mL 3    amoxicillin (AMOXIL) 500 MG capsule       methadone (DOLOPHINE) 5 MG tablet TAKE 1 TABLET BY MOUTH EVERY 6 HOURS AS DIRECTED FOR NON ACUTE PAIN FOR 30 DAYS      magnesium oxide (MAG-OX) 400 (240 Mg) MG tablet TAKE 1 TABLET BY MOUTH EVERY DAY 90 tablet 3    pregabalin (LYRICA) 150 MG capsule       hydroCHLOROthiazide (HYDRODIURIL) 25 MG tablet Take 1 tablet by mouth daily 90 tablet 3    lidocaine viscous hcl (XYLOCAINE) 2 % SOLN solution Apply with a swab to mouth lesions every hour as needed for pain 100 mL 2    levETIRAcetam (KEPPRA) 500 MG tablet Take 1 tablet by mouth in the morning and at bedtime      levothyroxine (SYNTHROID) 75 MCG tablet Take 1 tablet by mouth once      oxyCODONE HCl (OXY-IR) 10 MG immediate release tablet       diclofenac sodium (VOLTAREN) 1 % GEL APPLY 2 GRAMS 4 TIMES A DAY BY TOPICAL ROUTE FOR 30 DAYS.      valACYclovir (VALTREX) 500 MG tablet Take 1 tablet by mouth daily as needed (lesions) 90 tablet 3    Handicap Placard MISC by Does not apply route 5 years, cannot walk over 200 ft. 1 each 0    Handicap Placard MISC by Does not apply route 5 years, cannot walk over 200 ft. 1 each 0    tiZANidine (ZANAFLEX) 4 MG tablet TAKE 1 TABLET BY MOUTH AT BEDTIME  5    Insulin Pen Needle (B-D ULTRAFINE III SHORT PEN) 31G X 8 MM MISC Inject 1 each into the skin daily May substitute with any brand 100 each 11    Multiple Vitamins-Minerals (THERAPEUTIC MULTIVITAMIN-MINERALS) tablet Take 1 tablet by mouth daily      vitamin B-12 (CYANOCOBALAMIN) 500 MCG tablet Take 1 tablet by mouth daily      Biotin 5 MG CAPS Take 1 capsule by mouth daily        (All medications reviewed and updated by provider sincelast office visit or hospitalization)   Meperidine hcl, Nalbuphine, Adhesive tape, Ddavp [desmopressin acetate], Humate-p [antihemophilic factor-vwf], Meperidine, Morphine, Nubain  [nalbuphine hcl], Propoxyphene, Tramadol, and Tylenol [acetaminophen]  Social History     Tobacco Use   Smoking

## 2024-06-26 ENCOUNTER — TELEPHONE (OUTPATIENT)
Dept: UROLOGY | Age: 59
End: 2024-06-26

## 2024-06-26 NOTE — TELEPHONE ENCOUNTER
[facility]  No PA needed for codes   [id number]  [phone]  [phone]  [phone]

## 2024-06-26 NOTE — TELEPHONE ENCOUNTER
Writer called patient to see if she could move up her UDS. Patient agreed to move up procedure. Patient was wondering if you could get something to relax her for the day of her procedure?

## 2024-06-28 ENCOUNTER — HOSPITAL ENCOUNTER (OUTPATIENT)
Dept: CT IMAGING | Age: 59
End: 2024-06-28
Payer: MEDICARE

## 2024-06-28 ENCOUNTER — HOSPITAL ENCOUNTER (OUTPATIENT)
Age: 59
Discharge: HOME | End: 2024-06-28
Payer: MEDICARE

## 2024-06-28 DIAGNOSIS — R31.0 GROSS HEMATURIA: ICD-10-CM

## 2024-06-28 LAB
EGFR, POC: >90 ML/MIN/1.73M2
EST. AVERAGE GLUCOSE BLD GHB EST-MCNC: 160 MG/DL
HBA1C MFR BLD: 7.2 % (ref 4–6)
POC CREATININE: 0.6 MG/DL (ref 0.51–1.19)
T4 FREE SERPL-MCNC: 1 NG/DL (ref 0.9–1.7)
TSH SERPL DL<=0.05 MIU/L-ACNC: 4.32 UIU/ML (ref 0.3–5)

## 2024-06-28 PROCEDURE — 6360000004 HC RX CONTRAST MEDICATION: Performed by: NURSE PRACTITIONER

## 2024-06-28 PROCEDURE — 74178 CT ABD&PLV WO CNTR FLWD CNTR: CPT | Performed by: NURSE PRACTITIONER

## 2024-06-28 PROCEDURE — 84439 ASSAY OF FREE THYROXINE: CPT

## 2024-06-28 PROCEDURE — 84443 ASSAY THYROID STIM HORMONE: CPT

## 2024-06-28 PROCEDURE — 2580000003 HC RX 258: Performed by: NURSE PRACTITIONER

## 2024-06-28 PROCEDURE — 83036 HEMOGLOBIN GLYCOSYLATED A1C: CPT

## 2024-06-28 PROCEDURE — 82565 ASSAY OF CREATININE: CPT

## 2024-06-28 PROCEDURE — 36415 COLL VENOUS BLD VENIPUNCTURE: CPT

## 2024-06-28 RX ORDER — 0.9 % SODIUM CHLORIDE 0.9 %
100 INTRAVENOUS SOLUTION INTRAVENOUS ONCE
Status: COMPLETED | OUTPATIENT
Start: 2024-06-28 | End: 2024-06-28

## 2024-06-28 RX ORDER — SODIUM CHLORIDE 0.9 % (FLUSH) 0.9 %
10 SYRINGE (ML) INJECTION PRN
Status: DISCONTINUED | OUTPATIENT
Start: 2024-06-28 | End: 2024-07-01 | Stop reason: HOSPADM

## 2024-06-28 RX ADMIN — IOPAMIDOL 120 ML: 755 INJECTION, SOLUTION INTRAVENOUS at 10:08

## 2024-06-28 RX ADMIN — SODIUM CHLORIDE 100 ML: 9 INJECTION, SOLUTION INTRAVENOUS at 10:09

## 2024-06-28 RX ADMIN — SODIUM CHLORIDE, PRESERVATIVE FREE 10 ML: 5 INJECTION INTRAVENOUS at 10:09

## 2024-07-03 ENCOUNTER — TELEPHONE (OUTPATIENT)
Dept: UROLOGY | Age: 59
End: 2024-07-03

## 2024-07-10 NOTE — TELEPHONE ENCOUNTER
Cysto,MAC @ [facility] 09/13/24 8:00am     PAT: 09/04/24 @ 10:15am -Phone Call     Spoke with patient, procedure information to be picked up.     Patient was offered sooner dates but she

## 2024-08-29 ENCOUNTER — TELEPHONE (OUTPATIENT)
Dept: UROLOGY | Age: 59
End: 2024-08-29

## 2024-08-29 NOTE — TELEPHONE ENCOUNTER
Patient left voicemail stating she would like to cancel procedure- patient is scheduled for a cysto on 9/13/24 w/ Dr. Jackson. Left voicemail for patient to call back, office would like to reschedule or note chart with reason for cancellation.

## 2024-09-04 ENCOUNTER — HOSPITAL ENCOUNTER (OUTPATIENT)
Dept: PREADMISSION TESTING | Age: 59
Discharge: HOME | End: 2024-09-04

## 2024-09-19 ENCOUNTER — OFFICE VISIT (OUTPATIENT)
Dept: ORTHOPEDIC SURGERY | Age: 59
End: 2024-09-19
Payer: MEDICARE

## 2024-09-19 DIAGNOSIS — Z96.652 STATUS POST TOTAL LEFT KNEE REPLACEMENT: Primary | ICD-10-CM

## 2024-09-19 PROCEDURE — 4004F PT TOBACCO SCREEN RCVD TLK: CPT | Performed by: ORTHOPAEDIC SURGERY

## 2024-09-19 PROCEDURE — 99213 OFFICE O/P EST LOW 20 MIN: CPT | Performed by: ORTHOPAEDIC SURGERY

## 2024-09-19 PROCEDURE — G8417 CALC BMI ABV UP PARAM F/U: HCPCS | Performed by: ORTHOPAEDIC SURGERY

## 2024-09-19 PROCEDURE — 3017F COLORECTAL CA SCREEN DOC REV: CPT | Performed by: ORTHOPAEDIC SURGERY

## 2024-09-19 PROCEDURE — G8427 DOCREV CUR MEDS BY ELIG CLIN: HCPCS | Performed by: ORTHOPAEDIC SURGERY

## 2025-01-24 PROBLEM — R79.82 ELEVATED C-REACTIVE PROTEIN: Status: RESOLVED | Noted: 2021-05-30 | Resolved: 2025-01-24

## 2025-01-24 PROBLEM — M10.9 ACUTE GOUT OF KNEE: Status: RESOLVED | Noted: 2017-08-15 | Resolved: 2025-01-24

## 2025-01-24 PROBLEM — F44.5 PSYCHOGENIC NONEPILEPTIC SEIZURE: Chronic | Status: ACTIVE | Noted: 2017-05-30

## 2025-01-24 PROBLEM — D62 ACUTE BLOOD LOSS ANEMIA: Status: RESOLVED | Noted: 2023-08-31 | Resolved: 2025-01-24

## 2025-02-14 ENCOUNTER — HOSPITAL ENCOUNTER (OUTPATIENT)
Dept: MRI IMAGING | Age: 60
Discharge: HOME | End: 2025-02-16
Attending: FAMILY MEDICINE
Payer: MEDICARE

## 2025-02-14 DIAGNOSIS — R51.9 WORSENING HEADACHES: ICD-10-CM

## 2025-02-14 DIAGNOSIS — G93.5 CHIARI I MALFORMATION (HCC): ICD-10-CM

## 2025-02-14 DIAGNOSIS — R29.6 FREQUENT FALLS: ICD-10-CM

## 2025-02-14 PROCEDURE — 70551 MRI BRAIN STEM W/O DYE: CPT

## 2025-02-17 NOTE — RESULT ENCOUNTER NOTE
Magdalena - your brain MRI shows nothing new except some inflammation and fluid in your sinuses and fluid deep to your left ear. I recommend a week of levofloxacin for this. I am sending that prescription to [facility] in [place].

## 2025-02-21 ENCOUNTER — HOSPITAL ENCOUNTER (OUTPATIENT)
Dept: WOMENS IMAGING | Age: 60
Discharge: HOME | End: 2025-02-23
Attending: FAMILY MEDICINE
Payer: MEDICARE

## 2025-02-21 DIAGNOSIS — Z12.31 ENCOUNTER FOR SCREENING MAMMOGRAM FOR BREAST CANCER: ICD-10-CM

## 2025-02-21 PROCEDURE — 77063 BREAST TOMOSYNTHESIS BI: CPT

## (undated) DEVICE — Z INACTIVE USE 2660664 SOLUTION IRRIG 3000ML 0.9% SOD CHL USP UROMATIC PLAS CONT

## (undated) DEVICE — ELECTRODE ES L3IN S STL BLDE INSUL DISP VALLEYLAB EDGE

## (undated) DEVICE — BANDAGE COBAN 4 IN COMPR W4INXL5YD FOAM COHESIVE QUIK STK SELF ADH SFT

## (undated) DEVICE — BLANKET WRM W29.9XL79.1IN UP BODY FORC AIR MISTRAL-AIR

## (undated) DEVICE — DRAPE EQUIP STAND XL MAYO CVR

## (undated) DEVICE — PADDING CAST W6INXL4YD COT COHESIVE HND TEARABLE SPEC 100

## (undated) DEVICE — CEMENT MIXING SYSTEM WITH FEMORAL BREAKWAY NOZZLE: Brand: REVOLUTION

## (undated) DEVICE — 2108 SERIES SAGITTAL BLADE FLARED, GROUND  (29.0 X 1.32 X 84.0MM)

## (undated) DEVICE — BANDAGE,SELF ADHRNT,COFLEX,4"X5YD,STRL: Brand: COLABEL

## (undated) DEVICE — SUTURE ETHLN SZ 3-0 L18IN NONABSORBABLE BLK FS-1 L24MM 3/8 663H

## (undated) DEVICE — SUTURE FIBERWIRE SZ 2 L38IN NONABSORBABLE BLU L36.6MM 1/2 AR7202

## (undated) DEVICE — SOLUTION IV IRRIG LACTATED RINGERS 3000ML 2B7487

## (undated) DEVICE — INTENDED TO SUPPORT AND MAINTAIN THE POSITION OF AN ANESTHETIZED PATIENT DURING SURGERY: Brand: HERMANTOR XL PINK KNEE POSITIONING PAD

## (undated) DEVICE — MASTISOL ADHESIVE LIQ 2/3ML

## (undated) DEVICE — 4-PORT MANIFOLD: Brand: NEPTUNE 2

## (undated) DEVICE — SUTURE STRATAFIX SYMMETRIC PDS + SZ 1 L18IN ABSRB VLT L48MM SXPP1A400

## (undated) DEVICE — Device

## (undated) DEVICE — DUAL CUT SAGITTAL BLADE

## (undated) DEVICE — SOLUTION IRRIG 1000ML STRL H2O USP PLAS POUR BTL

## (undated) DEVICE — PACK ARTHRO W PCH

## (undated) DEVICE — DRESSING PETRO W3XL8IN OIL EMUL N ADH GZ KNIT IMPREG CELOS

## (undated) DEVICE — GLOVE SURG 8.5 PF POLYMER WHT STRL SIGN LTX ESSENTIAL LTX

## (undated) DEVICE — SUTURE VCRL SZ 0 L36IN ABSRB UD CT-1 L36MM 1/2 CIR TAPR PNT VCP946H

## (undated) DEVICE — DRESSING ALGINATE POST OPERATIVE 12X3.5 IN RECT PRIMASEAL

## (undated) DEVICE — CLOSURE SKIN FLX NONINVASIVE PRELOC TECHNOLOGY FOR 24IN

## (undated) DEVICE — KIT AUTOTRNS APPL AERO 2 SET SYR 2 TIP FOR PLT SEP SYS GPS

## (undated) DEVICE — COOLER THER 20-31IN L CRYO COMB W/ PD KNEE TB GRAV FLO

## (undated) DEVICE — SUTURE STRATAFIX SPRL MCRYL + SZ 2 0 L27IN ABSRB UD W NDL SXMP1B419

## (undated) DEVICE — GLOVE ORANGE PI 8 1/2   MSG9085

## (undated) DEVICE — KIT SEP W/ BLD DRAW TB SYR NDL TRNQT PD

## (undated) DEVICE — GLOVE ORTHO 8   MSG9480

## (undated) DEVICE — [TOMCAT CUTTER, ARTHROSCOPIC SHAVER BLADE,  DO NOT RESTERILIZE,  DO NOT USE IF PACKAGE IS DAMAGED,  KEEP DRY,  KEEP AWAY FROM SUNLIGHT]: Brand: FORMULA

## (undated) DEVICE — NO USE 18 MONTHS GOWN STD LG  1153D

## (undated) DEVICE — DRESSING,GAUZE,XEROFORM,CURAD,1"X8",ST: Brand: CURAD

## (undated) DEVICE — 450 ML BOTTLE OF 0.05% CHLORHEXIDINE GLUCONATE IN 99.95% STERILE WATER FOR IRRIGATION, USP AND APPLICATOR.: Brand: IRRISEPT ANTIMICROBIAL WOUND LAVAGE

## (undated) DEVICE — SOLUTION IRRIG 3000ML 0.9% SOD CHL USP UROMATIC PLAS CONT

## (undated) DEVICE — SURGICAL PROCEDURE PACK GWN W/ BTC A

## (undated) DEVICE — MERCY HEALTH ST CHARLES: Brand: MEDLINE INDUSTRIES, INC.

## (undated) DEVICE — ZIMMER® STERILE DISPOSABLE TOURNIQUET CUFF WITH PLC, DUAL PORT, SINGLE BLADDER, 30 IN. (76 CM)

## (undated) DEVICE — GLOVE SURG SZ 85 L12IN FNGR THK79MIL GRN LTX FREE

## (undated) DEVICE — GLOVE SURG SZ 8 L12IN FNGR THK13MIL BRN LTX SYN POLYMER W

## (undated) DEVICE — SOLUTION IV IRRIG POUR BRL 0.9% SODIUM CHL 2F7124

## (undated) DEVICE — SINGLE PORT MANIFOLD: Brand: NEPTUNE 2

## (undated) DEVICE — SOLUTION IV 1000ML LAC RINGERS PH 6.5 INJ USP VIAFLX PLAS

## (undated) DEVICE — SOLUTION IV IRRIG WATER 1000ML POUR BRL 2F7114